# Patient Record
Sex: MALE | Race: WHITE | Employment: FULL TIME | URBAN - METROPOLITAN AREA
[De-identification: names, ages, dates, MRNs, and addresses within clinical notes are randomized per-mention and may not be internally consistent; named-entity substitution may affect disease eponyms.]

---

## 2018-04-26 ENCOUNTER — OFFICE VISIT (OUTPATIENT)
Dept: URGENT CARE | Facility: CLINIC | Age: 58
End: 2018-04-26
Payer: COMMERCIAL

## 2018-04-26 VITALS
SYSTOLIC BLOOD PRESSURE: 122 MMHG | HEIGHT: 71 IN | OXYGEN SATURATION: 97 % | BODY MASS INDEX: 36.4 KG/M2 | HEART RATE: 85 BPM | RESPIRATION RATE: 16 BRPM | TEMPERATURE: 98.2 F | DIASTOLIC BLOOD PRESSURE: 90 MMHG | WEIGHT: 260 LBS

## 2018-04-26 DIAGNOSIS — J01.00 ACUTE NON-RECURRENT MAXILLARY SINUSITIS: ICD-10-CM

## 2018-04-26 DIAGNOSIS — R05.9 COUGH: ICD-10-CM

## 2018-04-26 DIAGNOSIS — J98.8 RTI (RESPIRATORY TRACT INFECTION): ICD-10-CM

## 2018-04-26 PROCEDURE — 99214 OFFICE O/P EST MOD 30 MIN: CPT | Performed by: NURSE PRACTITIONER

## 2018-04-26 RX ORDER — BENZONATATE 100 MG/1
100 CAPSULE ORAL 3 TIMES DAILY PRN
Qty: 60 CAP | Refills: 0 | Status: SHIPPED | OUTPATIENT
Start: 2018-04-26 | End: 2018-05-11

## 2018-04-26 RX ORDER — AZITHROMYCIN 250 MG/1
TABLET, FILM COATED ORAL
Qty: 6 TAB | Refills: 0 | Status: SHIPPED | OUTPATIENT
Start: 2018-04-26 | End: 2018-05-11

## 2018-04-26 ASSESSMENT — ENCOUNTER SYMPTOMS
NAUSEA: 0
SINUS PRESSURE: 0
VOMITING: 0
SWEATS: 0
DIZZINESS: 0
COUGH: 1
EYE PAIN: 0
SORE THROAT: 0
MYALGIAS: 1
NECK PAIN: 0
SHORTNESS OF BREATH: 0
FEVER: 1
CHILLS: 0

## 2018-04-26 ASSESSMENT — PATIENT HEALTH QUESTIONNAIRE - PHQ9: CLINICAL INTERPRETATION OF PHQ2 SCORE: 0

## 2018-04-26 NOTE — PROGRESS NOTES
Subjective:     Arias Nath is a 57 y.o. male who presents for Sinus Problem (cough with phlegm green and clear, headache, x2wks)       Sinus Problem   This is a new problem. The current episode started 1 to 4 weeks ago. The problem is unchanged. There has been no fever. His pain is at a severity of 2/10. The pain is mild. Associated symptoms include congestion and coughing. Pertinent negatives include no chills, neck pain, shortness of breath, sinus pressure or sore throat. Past treatments include acetaminophen. The treatment provided no relief.   Cough   This is a new problem. The current episode started 1 to 4 weeks ago. The problem has been unchanged. The problem occurs constantly. The cough is productive of sputum. Associated symptoms include a fever, myalgias and nasal congestion. Pertinent negatives include no chest pain, chills, rash, sore throat, shortness of breath or sweats. Nothing aggravates the symptoms. He has tried OTC cough suppressant for the symptoms. The treatment provided no relief. His past medical history is significant for bronchitis.     Past Medical History:   Diagnosis Date   • Hypertension    No past surgical history on file.  Social History     Social History   • Marital status:      Spouse name: N/A   • Number of children: N/A   • Years of education: N/A     Occupational History   • Not on file.     Social History Main Topics   • Smoking status: Never Smoker   • Smokeless tobacco: Never Used   • Alcohol use Yes      Comment: social   • Drug use: No   • Sexual activity: Not on file     Other Topics Concern   • Not on file     Social History Narrative   • No narrative on file    No family history on file. Review of Systems   Constitutional: Positive for fever and malaise/fatigue. Negative for chills.   HENT: Positive for congestion. Negative for sinus pressure and sore throat.    Eyes: Negative for pain.   Respiratory: Positive for cough. Negative for shortness of breath.   "  Cardiovascular: Negative for chest pain.   Gastrointestinal: Negative for nausea and vomiting.   Genitourinary: Negative for hematuria.   Musculoskeletal: Positive for myalgias. Negative for neck pain.   Skin: Negative for rash.   Neurological: Negative for dizziness.   No Known Allergies   Objective:   /90   Pulse 85   Temp 36.8 °C (98.2 °F)   Resp 16   Ht 1.803 m (5' 11\")   Wt 117.9 kg (260 lb)   SpO2 97%   BMI 36.26 kg/m²   Physical Exam   Constitutional: He is oriented to person, place, and time. He appears well-developed and well-nourished. No distress.   HENT:   Head: Normocephalic and atraumatic.   Nose: Rhinorrhea present. Right sinus exhibits maxillary sinus tenderness and frontal sinus tenderness. Left sinus exhibits maxillary sinus tenderness and frontal sinus tenderness.   Eyes: Conjunctivae and EOM are normal. Pupils are equal, round, and reactive to light.   Cardiovascular: Normal rate and regular rhythm.    No murmur heard.  Pulmonary/Chest: Effort normal and breath sounds normal. No respiratory distress. He has no decreased breath sounds. He has no wheezes. He has no rhonchi. He has no rales.   Abdominal: Soft. He exhibits no distension. There is no tenderness.   Neurological: He is alert and oriented to person, place, and time. He has normal reflexes. No sensory deficit.   Skin: Skin is warm and dry.   Psychiatric: He has a normal mood and affect.   Vitals reviewed.        Assessment/Plan:   Assessment    1. Acute non-recurrent maxillary sinusitis  2. RTI (respiratory tract infection)  3. Cough  - benzonatate (TESSALON) 100 MG Cap; Take 1 Cap by mouth 3 times a day as needed for Cough.  Dispense: 60 Cap; Refill: 0  - azithromycin (ZITHROMAX) 250 MG Tab; Take 2 tablets by mouth on day one. Take one tablet by mouth the remaining days until gone  Dispense: 6 Tab; Refill: 0    Advised to continue supportive care with Tylenol and/or ibuprofen for fevers and discomfort. Increased fluids and " "electrolytes.  Patient requesting azithromycin \"stating it is the only antibiotic that works\".  Contingent antibiotic prescription given to patient to fill upon meeting criteria of guidelines discussed.     Patient given precautionary s/sx that mandate immediate follow up and evaluation in the ED. Advised of risks of not doing so.    DDX, Supportive care, and indications for immediate follow-up discussed with patient.    Instructed to return to clinic or nearest emergency department if we are not available for any change in condition, further concerns, or worsening of symptoms.    The patient demonstrated a good understanding and agreed with the treatment plan.      "

## 2018-05-11 ENCOUNTER — OFFICE VISIT (OUTPATIENT)
Dept: MEDICAL GROUP | Facility: MEDICAL CENTER | Age: 58
End: 2018-05-11
Payer: COMMERCIAL

## 2018-05-11 VITALS
DIASTOLIC BLOOD PRESSURE: 80 MMHG | BODY MASS INDEX: 36.36 KG/M2 | TEMPERATURE: 97.8 F | HEIGHT: 71 IN | HEART RATE: 78 BPM | SYSTOLIC BLOOD PRESSURE: 118 MMHG | WEIGHT: 259.7 LBS | OXYGEN SATURATION: 95 %

## 2018-05-11 DIAGNOSIS — I10 ESSENTIAL HYPERTENSION: ICD-10-CM

## 2018-05-11 DIAGNOSIS — E66.9 OBESITY (BMI 35.0-39.9 WITHOUT COMORBIDITY): ICD-10-CM

## 2018-05-11 DIAGNOSIS — R05.9 COUGH: ICD-10-CM

## 2018-05-11 PROCEDURE — 99214 OFFICE O/P EST MOD 30 MIN: CPT | Performed by: FAMILY MEDICINE

## 2018-05-11 RX ORDER — LOSARTAN POTASSIUM 100 MG/1
TABLET ORAL
COMMUNITY
Start: 2018-04-26 | End: 2018-07-27 | Stop reason: SDUPTHER

## 2018-05-11 RX ORDER — METHYLPREDNISOLONE 4 MG/1
TABLET ORAL
Qty: 21 TAB | Refills: 0 | Status: SHIPPED | OUTPATIENT
Start: 2018-05-11 | End: 2019-08-02

## 2018-05-11 RX ORDER — AMLODIPINE BESYLATE 5 MG/1
TABLET ORAL
COMMUNITY
Start: 2018-04-26 | End: 2018-07-27 | Stop reason: SDUPTHER

## 2018-05-11 NOTE — PROGRESS NOTES
CC:  Diagnoses of Cough, Essential hypertension, and Obesity (BMI 35.0-39.9 without comorbidity) were pertinent to this visit.    HISTORY OF THE PRESENT ILLNESS: Patient is a 57 y.o. male. This pleasant patient is here today to establish care after being seen by Dr. Kim, internal medicine at [facility] and then Dr. Zack mckeon, family practitioner at [facility] family medicine. His insurance is no longer accepted at those practices and he is here today to establish care. Patient has the complaint of a cough that is been problematic. Details below. His only chronic health problem is hypertension.    Health Maintenance: Completed      Cough  This is a chronic problem that is bothering this patient for 1-2 months. He had an illness in January seem to get better then he was seen in the urgent care with another illness thought to be a sinusitis and a cough. He was placed on Zithromax and given Tessalon Perles that really have not helped with his cough. He tells me that he travels for his job and when he was down in [place] where there were many plans symbolism he noticed that his drainage became excessive with clear rhinorrhea and drainage in the back of his throat according to his cough was worse. Then when he went to [place], he cough lasts any noted on the way back home that he had been coughing much less. Now back in [place] his home station he is back having a cough that is sometimes productive of a more liquid sputum not a thick green sputum such as she would see with infection. I suspect this patient either has residual viral cough or is starting to experience some allergies. We will treat and try to get him feeling better.    Essential hypertension  This is a chronic health problem that this patient has had for proximally 20 years. They've worked through multiple combinations of medications to finally settle on amlodipine 5 mg daily and losartan 100 mg daily. His blood pressure is excellent today at 118/80. We will get  some baseline blood work and check his lipids and his electrolytes. Will follow up with those as is appropriate.    Obesity (BMI 35.0-39.9 without comorbidity) (HCC)  This is a chronic health problem that is uncontrolled with current medications and lifestyle measures.    Allergies: Patient has no known allergies.    Current Outpatient Prescriptions Ordered in [facility]   Medication Sig Dispense Refill   • amLODIPine (NORVASC) 5 MG Tab      • losartan (COZAAR) 100 MG Tab      • MethylPREDNISolone (MEDROL DOSEPAK) 4 MG Tablet Therapy Pack As directed on the packaging label. 21 Tab 0     No current Epic-ordered facility-administered medications on file.        Past Medical History:   Diagnosis Date   • Cough 5/11/2018   • Essential hypertension 5/11/2018   • Hypertension        Past Surgical History:   Procedure Laterality Date   • KNEE ARTHROSCOPY Right 2005       Social History   Substance Use Topics   • Smoking status: Never Smoker   • Smokeless tobacco: Never Used   • Alcohol use 0.6 oz/week     1 Cans of beer per week      Comment: social       Social History     Social History Narrative   • No narrative on file       Family History   Problem Relation Age of Onset   • Colon Cancer Mother    • Hypertension Mother    • Hyperlipidemia Mother    • No Known Problems Father        ROS:     - Constitutional: Negative for fever, chills, unexpected weight change, and fatigue/generalized weakness.     - HEENT: Negative for headaches, vision changes, hearing changes, ear pain, ear discharge, rhinorrhea, sinus congestion, sore throat, and neck pain.      - Respiratory: Positive for nonproductive cough persistent throughout the day otherwise denies  chest congestion, dyspnea, wheezing, and crackles.      - Cardiovascular: Negative for chest pain, palpitations, orthopnea, and bilateral lower extremity edema.     - Gastrointestinal: Negative for heartburn, nausea, vomiting, abdominal pain, hematochezia, melena, diarrhea,  "constipation, and greasy/foul-smelling stools.     - Genitourinary: Negative for dysuria, polyuria, hematuria, pyuria, urinary urgency, and urinary incontinence.     - Musculoskeletal: Negative for myalgias, back pain, and joint pain.     - Skin: Negative for rash, itching, cyanotic skin color change.     - Neurological: Negative for dizziness, tingling, tremors, focal sensory deficit, focal weakness and headaches.     - Endo/Heme/Allergies: Does not bruise/bleed easily.     - Psychiatric/Behavioral: Negative for depression, suicidal/homicidal ideation and memory loss.        - NOTE: All other systems reviewed and are negative, except as in HPI.      .      Exam: Blood pressure 118/80, pulse 78, temperature 36.6 °C (97.8 °F), height 1.803 m (5' 11\"), weight 117.8 kg (259 lb 11.2 oz), SpO2 95 %. Body mass index is 36.22 kg/m².    General: Normal appearing. No distress.  HEENT: Normocephalic. Eyes conjunctiva clear lids without ptosis, pupils equal and reactive to light accommodation, ears normal shape and contour, canals are clear bilaterally, tympanic membranes are benign, nasal mucosa benign, oropharynx is without erythema, edema or exudates.   Neck: Supple without JVD or bruit. Thyroid is not enlarged.  Pulmonary: Clear to ausculation.  Normal effort. No rales, ronchi, or wheezing.  Cardiovascular: Regular rate and rhythm without murmur. Carotid and radial pulses are intact and equal bilaterally.  Abdomen: Soft, nontender, nondistended. Normal bowel sounds. Liver and spleen are not palpable  Neurologic: Grossly nonfocal  Lymph: No cervical, supraclavicular or axillary lymph nodes are palpable  Skin: Warm and dry.  No obvious lesions.  Musculoskeletal: Normal gait. No extremity cyanosis, clubbing, or edema.  Psych: Normal mood and affect. Alert and oriented x3. Judgment and insight is normal.    Please note that this dictation was created using voice recognition software. I have made every reasonable attempt to " correct obvious errors, but I expect that there are errors of grammar and possibly content that I did not discover before finalizing the note.      Assessment/Plan  1. Cough  Uncontrolled, I suspect this is directly related to allergies. We will do a Medrol Dosepak which she will start immediately. If his cough is persisting after this we need to proceed with CT of the chest    2. Essential hypertension  Controlled, patient is going to get blood work and we will see him back for results. He is to continue with current medications which has his lipid pressure well controlled  - COMP METABOLIC PANEL; Future  - CBC WITH DIFFERENTIAL; Future  - LIPID PROFILE; Future    3. Obesity (BMI 35.0-39.9 without comorbidity)  Uncontrolled, patient I spent a great deal of time talking about his weight and ways in the past he has lost weight. He needs to get busy working on weight loss once again. Previously he was able to do it through the [employer], if that works for him I recommended he get back to doing that otherwise we can refer him to the program here at [facility].    Patient identified as having weight management issue.  Appropriate orders and counseling given    - Patient identified as having weight management issue.  Appropriate orders and counseling given.

## 2018-06-19 ENCOUNTER — HOSPITAL ENCOUNTER (OUTPATIENT)
Dept: LAB | Facility: MEDICAL CENTER | Age: 58
End: 2018-06-19
Attending: FAMILY MEDICINE
Payer: COMMERCIAL

## 2018-06-19 DIAGNOSIS — I10 ESSENTIAL HYPERTENSION: ICD-10-CM

## 2018-06-19 LAB
ALBUMIN SERPL BCP-MCNC: 4.1 G/DL (ref 3.2–4.9)
ALBUMIN/GLOB SERPL: 1.4 G/DL
ALP SERPL-CCNC: 70 U/L (ref 30–99)
ALT SERPL-CCNC: 27 U/L (ref 2–50)
ANION GAP SERPL CALC-SCNC: 7 MMOL/L (ref 0–11.9)
AST SERPL-CCNC: 16 U/L (ref 12–45)
BASOPHILS # BLD AUTO: 0.4 % (ref 0–1.8)
BASOPHILS # BLD: 0.02 K/UL (ref 0–0.12)
BILIRUB SERPL-MCNC: 0.7 MG/DL (ref 0.1–1.5)
BUN SERPL-MCNC: 17 MG/DL (ref 8–22)
CALCIUM SERPL-MCNC: 9 MG/DL (ref 8.5–10.5)
CHLORIDE SERPL-SCNC: 106 MMOL/L (ref 96–112)
CHOLEST SERPL-MCNC: 161 MG/DL (ref 100–199)
CO2 SERPL-SCNC: 26 MMOL/L (ref 20–33)
CREAT SERPL-MCNC: 0.83 MG/DL (ref 0.5–1.4)
EOSINOPHIL # BLD AUTO: 0.07 K/UL (ref 0–0.51)
EOSINOPHIL NFR BLD: 1.3 % (ref 0–6.9)
ERYTHROCYTE [DISTWIDTH] IN BLOOD BY AUTOMATED COUNT: 42.4 FL (ref 35.9–50)
GLOBULIN SER CALC-MCNC: 3 G/DL (ref 1.9–3.5)
GLUCOSE SERPL-MCNC: 95 MG/DL (ref 65–99)
HCT VFR BLD AUTO: 43.8 % (ref 42–52)
HDLC SERPL-MCNC: 42 MG/DL
HGB BLD-MCNC: 14.7 G/DL (ref 14–18)
IMM GRANULOCYTES # BLD AUTO: 0.02 K/UL (ref 0–0.11)
IMM GRANULOCYTES NFR BLD AUTO: 0.4 % (ref 0–0.9)
LDLC SERPL CALC-MCNC: 101 MG/DL
LYMPHOCYTES # BLD AUTO: 1.58 K/UL (ref 1–4.8)
LYMPHOCYTES NFR BLD: 29.2 % (ref 22–41)
MCH RBC QN AUTO: 29.4 PG (ref 27–33)
MCHC RBC AUTO-ENTMCNC: 33.6 G/DL (ref 33.7–35.3)
MCV RBC AUTO: 87.6 FL (ref 81.4–97.8)
MONOCYTES # BLD AUTO: 0.7 K/UL (ref 0–0.85)
MONOCYTES NFR BLD AUTO: 12.9 % (ref 0–13.4)
NEUTROPHILS # BLD AUTO: 3.03 K/UL (ref 1.82–7.42)
NEUTROPHILS NFR BLD: 55.8 % (ref 44–72)
NRBC # BLD AUTO: 0 K/UL
NRBC BLD-RTO: 0 /100 WBC
PLATELET # BLD AUTO: 223 K/UL (ref 164–446)
PMV BLD AUTO: 8.9 FL (ref 9–12.9)
POTASSIUM SERPL-SCNC: 4.3 MMOL/L (ref 3.6–5.5)
PROT SERPL-MCNC: 7.1 G/DL (ref 6–8.2)
RBC # BLD AUTO: 5 M/UL (ref 4.7–6.1)
SODIUM SERPL-SCNC: 139 MMOL/L (ref 135–145)
TRIGL SERPL-MCNC: 92 MG/DL (ref 0–149)
WBC # BLD AUTO: 5.4 K/UL (ref 4.8–10.8)

## 2018-06-19 PROCEDURE — 85025 COMPLETE CBC W/AUTO DIFF WBC: CPT

## 2018-06-19 PROCEDURE — 80053 COMPREHEN METABOLIC PANEL: CPT

## 2018-06-19 PROCEDURE — 36415 COLL VENOUS BLD VENIPUNCTURE: CPT

## 2018-06-19 PROCEDURE — 80061 LIPID PANEL: CPT

## 2018-06-20 ENCOUNTER — TELEPHONE (OUTPATIENT)
Dept: MEDICAL GROUP | Facility: MEDICAL CENTER | Age: 58
End: 2018-06-20

## 2018-06-20 NOTE — TELEPHONE ENCOUNTER
ESTABLISHED PATIENT PRE-VISIT PLANNING     Note: Patient will not be contacted if there is no indication to call.     1.  Reviewed notes from the last few office visits within the medical group: Yes 05/11/2018    2.  If any orders were placed at last visit or intended to be done for this visit (i.e. 6 mos follow-up), do we have Results/Consult Notes?        •  Labs - Labs ordered, completed on 06/19/2018 and results are in chart.   Note: If patient appointment is for lab review and patient did not complete labs, check with provider if OK to reschedule patient until labs completed.       •  Imaging - Imaging was not ordered at last office visit.       •  Referrals - No referrals were ordered at last office visit.    3. Is this appointment scheduled as a Hospital Follow-Up? No    4.  Immunizations were updated in Epic using WebIZ?: No WebIZ record      5.  Patient is due for the following Health Maintenance Topics:   Health Maintenance Due   Topic Date Due   • IMM DTaP/Tdap/Td Vaccine (1 - Tdap) [birth year]1981       6.  MDX printed for Provider? NO    7.  Patient was NOT informed to arrive 15 min prior to their scheduled appointment and bring in their medication bottles.

## 2018-06-22 ENCOUNTER — OFFICE VISIT (OUTPATIENT)
Dept: INTERNAL MEDICINE | Facility: MEDICAL CENTER | Age: 58
End: 2018-06-22
Payer: COMMERCIAL

## 2018-06-22 VITALS
WEIGHT: 261.4 LBS | TEMPERATURE: 98 F | SYSTOLIC BLOOD PRESSURE: 122 MMHG | HEART RATE: 80 BPM | DIASTOLIC BLOOD PRESSURE: 84 MMHG | HEIGHT: 71 IN | OXYGEN SATURATION: 94 % | BODY MASS INDEX: 36.6 KG/M2

## 2018-06-22 DIAGNOSIS — M70.22 OLECRANON BURSITIS, LEFT ELBOW: ICD-10-CM

## 2018-06-22 PROCEDURE — 99213 OFFICE O/P EST LOW 20 MIN: CPT | Performed by: INTERNAL MEDICINE

## 2018-06-22 NOTE — PROGRESS NOTES
Established Patient    Mr. Allen a 57 y.o. male who presents today with:  CC: Bug Bite (left elbow. x1 week)        Assessment and Plan    1. Olecranon bursitis -patient with difficulty and pain with full extension at the elbow generalized redness and increased temperature of the elbow and forearm. Question olecranon bursitis, septic versus early septic arthritis. Would not expect patient to have pain in elbow with passive movement. If just bursitis. I am referring the patient to the [facility] express for evaluation immediately. He will leave this office and drive there. Explained to patient that is very important to have this evaluated today. He understands.      Current Outpatient Prescriptions   Medication Sig Dispense Refill   • amLODIPine (NORVASC) 5 MG Tab      • losartan (COZAAR) 100 MG Tab      • MethylPREDNISolone (MEDROL DOSEPAK) 4 MG Tablet Therapy Pack As directed on the packaging label. 21 Tab 0     No current facility-administered medications for this visit.          followup No Follow-up on file.    This note was created using voice recognition software (Dragon). The accuracy of the dictation is limited by the abilities of the software. I have reviewed the note prior to signing, however some errors in grammar and context are still possible. If you have any questions related to this note please do not hesitate to contact our office.   _______________________________________________________    HPI:   1. Bug bite, initial encounter  One week ago he felt a sharp pinch left olecranon area. while he was sitting in the backyard at night. Felt a sting. Very short and he was bitten by a bug. Developed a small bump on the olecranon.. Solid in nature. Gradually increased over time. One week later it ruptured and fluid that resemble pus was expressed. Currently painful. No fevers or chills. Pain in the elbow joint on the left with full extension and full flexion. Generalized edema from the  "olecranon throughout the elbow.       has a past medical history of Cough (5/11/2018); Essential hypertension (5/11/2018); and Hypertension.     reports that he has never smoked. He has never used smokeless tobacco. He reports that he drinks about 0.6 oz of alcohol per week . He reports that he does not use drugs.      ROS: Pertinent positives as stated in HPI, all others reviewed as negative:        Physical Exam  /84   Pulse 80   Temp 36.7 °C (98 °F)   Ht 1.803 m (5' 11\")   Wt 118.6 kg (261 lb 6.4 oz)   SpO2 94%   BMI 36.46 kg/m²     Other: Left elbow: Generalized edema of the left elbow. Olecranon fluctuant and painful to palpation. Healing blister lesion tip of the elbow. Elbow is very warm to the touch from mid forearm to mid biceps. Full range of motion but discomfort with full extension of the elbow and full flexion. Similar discomfort with passive flexion and extension. Redness of the olecranon area.        Current Outpatient Prescriptions on File Prior to Visit   Medication Sig Dispense Refill   • amLODIPine (NORVASC) 5 MG Tab      • losartan (COZAAR) 100 MG Tab      • MethylPREDNISolone (MEDROL DOSEPAK) 4 MG Tablet Therapy Pack As directed on the packaging label. 21 Tab 0     No current facility-administered medications on file prior to visit.            Signed by: Alvin Schwartz M.D.  "

## 2018-06-27 ENCOUNTER — OFFICE VISIT (OUTPATIENT)
Dept: MEDICAL GROUP | Facility: MEDICAL CENTER | Age: 58
End: 2018-06-27
Payer: COMMERCIAL

## 2018-06-27 VITALS
TEMPERATURE: 98.2 F | HEIGHT: 71 IN | SYSTOLIC BLOOD PRESSURE: 120 MMHG | OXYGEN SATURATION: 95 % | BODY MASS INDEX: 36.94 KG/M2 | DIASTOLIC BLOOD PRESSURE: 76 MMHG | WEIGHT: 263.89 LBS | HEART RATE: 75 BPM | RESPIRATION RATE: 16 BRPM

## 2018-06-27 DIAGNOSIS — I10 ESSENTIAL HYPERTENSION: ICD-10-CM

## 2018-06-27 DIAGNOSIS — M70.22 OLECRANON BURSITIS OF LEFT ELBOW: ICD-10-CM

## 2018-06-27 DIAGNOSIS — E66.9 OBESITY (BMI 35.0-39.9 WITHOUT COMORBIDITY): ICD-10-CM

## 2018-06-27 PROBLEM — R05.9 COUGH: Status: RESOLVED | Noted: 2018-05-11 | Resolved: 2018-06-27

## 2018-06-27 PROCEDURE — 99214 OFFICE O/P EST MOD 30 MIN: CPT | Performed by: FAMILY MEDICINE

## 2018-06-27 RX ORDER — AMOXICILLIN 500 MG/1
CAPSULE ORAL
COMMUNITY
Start: 2018-06-22 | End: 2019-08-02

## 2018-06-27 RX ORDER — DOXYCYCLINE HYCLATE 100 MG
TABLET ORAL
COMMUNITY
Start: 2018-06-22 | End: 2019-08-19

## 2018-06-27 RX ORDER — MELOXICAM 15 MG/1
TABLET ORAL
COMMUNITY
Start: 2018-06-22 | End: 2020-02-12

## 2018-06-27 NOTE — ASSESSMENT & PLAN NOTE
This is a chronic health problem for this patient that is really giving him a difficult time.  He has been watching his diet and been unable to lose weight.  I did advocate the metabolism revolution book for him to consider.  Is going to read up on that and then send me an email on 8/1/18 with his current weight.

## 2018-06-27 NOTE — ASSESSMENT & PLAN NOTE
This was something that started for this patient about June 20 or 21 he was seen on June 22 at the [facility].  Started on antibiotics and told that he had to complete all of his antibiotics and be very careful about the bursitis.  It already looks greatly improved.  He still has some swelling to the area.  States that the pain has completely resolved.  There is no erythema there is no drainage.  He is getting complete his antibiotics and I am going to have him use warm compresses to try and pull in the antibiotics to the bursa completely treated.  He has an appointment to see 1 of the sports medicine specialist at the [facility] and I have asked him to keep that appointment.  He is going to play golf this weekend he is to ice after the golf.

## 2018-06-27 NOTE — PROGRESS NOTES
CC:Diagnoses of Essential hypertension, Olecranon bursitis of left elbow, and Obesity (BMI 35.0-39.9 without comorbidity) ([facility]) were pertinent to this visit.      HISTORY OF PRESENT ILLNESS: Patient is a 57 y.o. male established patient who presents today to to follow-up on chronic health problems as outlined below.  Patient is blood work would like to review those results.    Health Maintenance: Completed    Essential hypertension  This is a chronic health problem that is well controlled with current medications and lifestyle measures. The patient denies chest pain, shortness of breath or dyspnea on exertion.  Blood pressure is excellent today at 120/76.  He will continue with current meds.  Patient's lipid panel comes back showing his total cholesterol is 161, triglycerides 92, HDL 42 and his LDL is 101.  We will continue to monitor    Olecranon bursitis of left elbow  This was something that started for this patient about June 20 or 21 he was seen on June 22 at the [facility].  Started on antibiotics and told that he had to complete all of his antibiotics and be very careful about the bursitis.  It already looks greatly improved.  He still has some swelling to the area.  States that the pain has completely resolved.  There is no erythema there is no drainage.  He is getting complete his antibiotics and I am going to have him use warm compresses to try and pull in the antibiotics to the bursa completely treated.  He has an appointment to see 1 of the sports medicine specialist at the [facility] and I have asked him to keep that appointment.  He is going to play golf this weekend he is to ice after the golf.    Obesity (BMI 35.0-39.9 without comorbidity) ([facility])  This is a chronic health problem for this patient that is really giving him a difficult time.  He has been watching his diet and been unable to lose weight.  I did advocate the metabolism revolution book for him to consider.  Is  going to read up on that and then send me an email on 8/1/18 with his current weight.      Patient Active Problem List    Diagnosis Date Noted   • Olecranon bursitis of left elbow 06/27/2018   • Essential hypertension 05/11/2018   • Obesity (BMI 35.0-39.9 without comorbidity) ([facility]) 05/11/2018      Allergies:Patient has no known allergies.    Current Outpatient Prescriptions   Medication Sig Dispense Refill   • amoxicillin (AMOXIL) 500 MG Cap      • doxycycline (VIBRAMYCIN) 100 MG Tab      • meloxicam (MOBIC) 15 MG tablet      • amLODIPine (NORVASC) 5 MG Tab      • losartan (COZAAR) 100 MG Tab      • MethylPREDNISolone (MEDROL DOSEPAK) 4 MG Tablet Therapy Pack As directed on the packaging label. 21 Tab 0     No current facility-administered medications for this visit.        Social History   Substance Use Topics   • Smoking status: Never Smoker   • Smokeless tobacco: Never Used   • Alcohol use 0.6 oz/week     1 Cans of beer per week      Comment: social     Social History     Social History Narrative   • No narrative on file       Family History   Problem Relation Age of Onset   • Colon Cancer Mother    • Hypertension Mother    • Hyperlipidemia Mother    • No Known Problems Father        Review of Systems:      - Constitutional: Negative for fever, chills, unexpected weight change, and fatigue/generalized weakness.     - HEENT: Negative for headaches, vision changes, hearing changes, ear pain, ear discharge, rhinorrhea, sinus congestion, sore throat, and neck pain.      - Respiratory: Negative for cough, sputum production, chest congestion, dyspnea, wheezing, and crackles.      - Cardiovascular: Negative for chest pain, palpitations, orthopnea, and bilateral lower extremity edema.     - Gastrointestinal: Negative for heartburn, nausea, vomiting, abdominal pain, hematochezia, melena, diarrhea, constipation, and greasy/foul-smelling stools.     - Genitourinary: Negative for dysuria, polyuria, hematuria, pyuria, urinary  "urgency, and urinary incontinence.    - Musculoskeletal: Negative for myalgias, back pain, and joint pain.     - Skin: Negative for rash, itching, cyanotic skin color change.     - Neurological: Negative for dizziness, tingling, tremors, focal sensory deficit, focal weakness and headaches.     - Endo/Heme/Allergies: Does not bruise/bleed easily.     - Psychiatric/Behavioral: Negative for depression, suicidal/homicidal ideation and memory loss.          - NOTE: All other systems reviewed and are negative, except as in HPI.    Exam:    Blood pressure 120/76, pulse 75, temperature 36.8 °C (98.2 °F), resp. rate 16, height 1.803 m (5' 11\"), weight 119.7 kg (263 lb 14.3 oz), SpO2 95 %. Body mass index is 36.81 kg/m².    General:  Well nourished, well developed male in NAD  LABS: 6/19/18: Results reviewed and discussed with the patient, questions answered.    Patient was seen for 25 minutes face to face of which, 20 minutes was spent counseling regarding the above mentioned problems.  Assessment/Plan:  1. Essential hypertension  Well-controlled, patient needs to get serious about weight loss because he is currently trading on excellent genetics but this will catch up with him and he will develop heart disease like most Americans.  He is physically active golfing on a regular basis but would like to see him add something more strenuous to his day.    2. Olecranon bursitis of left elbow  Stable, patient will keep the follow-up appointment with Greer Turner MD at [facility].    3. Obesity (BMI 35.0-39.9 without comorbidity) (HCC)  Uncontrolled, patient going to get serious about weight loss and will send me a my chart message on 8/1/18 with his current weight.  Today he was 263 here in the office        "

## 2018-07-27 RX ORDER — AMLODIPINE BESYLATE 5 MG/1
5 TABLET ORAL DAILY
Qty: 90 TAB | Refills: 3 | Status: SHIPPED | OUTPATIENT
Start: 2018-07-27 | End: 2019-07-16 | Stop reason: SDUPTHER

## 2018-07-27 RX ORDER — LOSARTAN POTASSIUM 100 MG/1
100 TABLET ORAL DAILY
Qty: 90 TAB | Refills: 3 | Status: SHIPPED | OUTPATIENT
Start: 2018-07-27 | End: 2019-07-17 | Stop reason: SDUPTHER

## 2018-07-30 ENCOUNTER — TELEPHONE (OUTPATIENT)
Dept: MEDICAL GROUP | Facility: MEDICAL CENTER | Age: 58
End: 2018-07-30

## 2018-07-30 NOTE — TELEPHONE ENCOUNTER
"· Authorization Request paperwork received from Lucias requiring provider signature.     · All appropriate fields completed by Medical Assistant: No    · Paperwork placed in \"MA to Provider\" folder/basket. Awaiting provider completion/signature.    "

## 2018-08-01 ENCOUNTER — PATIENT MESSAGE (OUTPATIENT)
Dept: MEDICAL GROUP | Facility: MEDICAL CENTER | Age: 58
End: 2018-08-01

## 2018-08-02 NOTE — TELEPHONE ENCOUNTER
From: Arias Nath  To: Leesa Brewer M.D.  Sent: 8/1/2018 9:55 PM PDT  Subject: RE: Non-Urgent Medical Question    Thanks. Trying to work out more as well. Will shoot you my weight on September 1. Keeps me motivated!!!    Thierry. Linda    ----- Message -----  From: Leesa Breewr M.D.  Sent: 8/1/18, 4:45 PM  To: Arias Nath  Subject: RE: Non-Urgent Medical Question    Lloyd Bianchi  I am showing that you weight 263 when you were in the office 6/27/18. You have lost a pound a week! That is FABULOUS!! If you did that every week for a year you would be down 52 pounds. Keep trying to eat better and better, I know you can do this!  Take care, Dr. Landers      ----- Message -----   From: Arias Nath   Sent: 8/1/2018 4:35 PM PDT   To: Leesa Brewer M.D.  Subject: Non-Urgent Medical Question    Hi doc.     Weight August 1 is 257.     Little better. Trying to eat better.     Thanks. Linda

## 2018-10-28 ENCOUNTER — PATIENT MESSAGE (OUTPATIENT)
Dept: MEDICAL GROUP | Facility: MEDICAL CENTER | Age: 58
End: 2018-10-28

## 2018-10-28 DIAGNOSIS — L81.9 CHANGING PIGMENTED SKIN LESION: ICD-10-CM

## 2018-10-29 ENCOUNTER — PATIENT MESSAGE (OUTPATIENT)
Dept: MEDICAL GROUP | Facility: MEDICAL CENTER | Age: 58
End: 2018-10-29

## 2018-10-30 NOTE — TELEPHONE ENCOUNTER
From: Arias Nath  To: Leesa Brewer M.D.  Sent: 10/29/2018 9:11 AM PDT  Subject: RE: Non-Urgent Medical Question    Ok thanks. I will call over there tomorrow after you guys do the referral to get an appointment. I think you and I are scheduled for December. If not I will call the office and get an appt set up.     Thanks.     ----- Message -----  From: Leesa Brewer M.D.  Sent: 10/29/18, 8:22 AM  To: Arias Nath  Subject: RE: Non-Urgent Medical Question    Lloyd Bianchi  Good job on the weight loss! We will put in that referral for you today. My suggestion will be the skin cancer [place] on [facility] or over in [place] because they have the earliest appointments.  Take care, Dr. Landers      ----- Message -----   From: Arias Nath   Sent: 10/28/2018 12:27 PM PDT   To: Leesa Brewer M.D.  Subject: Non-Urgent Medical Question    Hi Doc,    I have a couple of moles and skin tags that I would like to have a dermatologist take a look at. Can you give me a referral. I have heard it takes a while to get an appointment. My wife's dad  of melanoma so my wife has been on me to get them checked out.     Let me know. Weight update. 254.     Thanks. Linda

## 2018-11-29 ENCOUNTER — APPOINTMENT (RX ONLY)
Dept: URBAN - METROPOLITAN AREA CLINIC 4 | Facility: CLINIC | Age: 58
Setting detail: DERMATOLOGY
End: 2018-11-29

## 2018-11-29 DIAGNOSIS — L81.4 OTHER MELANIN HYPERPIGMENTATION: ICD-10-CM

## 2018-11-29 DIAGNOSIS — L82.1 OTHER SEBORRHEIC KERATOSIS: ICD-10-CM

## 2018-11-29 DIAGNOSIS — L91.8 OTHER HYPERTROPHIC DISORDERS OF THE SKIN: ICD-10-CM

## 2018-11-29 DIAGNOSIS — D22 MELANOCYTIC NEVI: ICD-10-CM

## 2018-11-29 PROBLEM — I10 ESSENTIAL (PRIMARY) HYPERTENSION: Status: ACTIVE | Noted: 2018-11-29

## 2018-11-29 PROBLEM — D48.5 NEOPLASM OF UNCERTAIN BEHAVIOR OF SKIN: Status: ACTIVE | Noted: 2018-11-29

## 2018-11-29 PROCEDURE — ? COUNSELING

## 2018-11-29 PROCEDURE — 11101: CPT

## 2018-11-29 PROCEDURE — 99203 OFFICE O/P NEW LOW 30 MIN: CPT | Mod: 25

## 2018-11-29 PROCEDURE — ? BIOPSY BY SHAVE METHOD

## 2018-11-29 PROCEDURE — 11100: CPT

## 2018-11-29 PROCEDURE — ? OBSERVATION AND MEASURE

## 2018-11-29 ASSESSMENT — LOCATION SIMPLE DESCRIPTION DERM
LOCATION SIMPLE: RIGHT ANTERIOR NECK
LOCATION SIMPLE: RIGHT SCALP
LOCATION SIMPLE: LEFT UPPER BACK
LOCATION SIMPLE: LEFT TEMPLE
LOCATION SIMPLE: LEFT ZYGOMA
LOCATION SIMPLE: POSTERIOR NECK

## 2018-11-29 ASSESSMENT — LOCATION DETAILED DESCRIPTION DERM
LOCATION DETAILED: RIGHT MEDIAL FRONTAL SCALP
LOCATION DETAILED: RIGHT LATERAL TRAPEZIAL NECK
LOCATION DETAILED: RIGHT CLAVICULAR NECK
LOCATION DETAILED: LEFT CENTRAL ZYGOMA
LOCATION DETAILED: LEFT SUPERIOR LATERAL UPPER BACK
LOCATION DETAILED: LEFT MEDIAL TRAPEZIAL NECK
LOCATION DETAILED: LEFT CENTRAL TEMPLE

## 2018-11-29 ASSESSMENT — LOCATION ZONE DERM
LOCATION ZONE: TRUNK
LOCATION ZONE: SCALP
LOCATION ZONE: FACE
LOCATION ZONE: NECK

## 2018-11-29 NOTE — PROCEDURE: BIOPSY BY SHAVE METHOD
Detail Level: Detailed
Biopsy Method: Dermablade
Hemostasis: Drysol
Bill For Surgical Tray: no
Notification Instructions: Patient will be notified of biopsy results. However, patient instructed to call the office if not contacted within 2 weeks.
Curettage Text: The wound bed was treated with curettage after the biopsy was performed.
Lab: [id number]
Cryotherapy Text: The wound bed was treated with cryotherapy after the biopsy was performed.
Consent: Written consent was obtained and risks were reviewed including but not limited to scarring, infection, bleeding, scabbing, incomplete removal, nerve damage and allergy to anesthesia.
Anesthesia Type: 1% lidocaine with epinephrine
Size Of Lesion In Cm: 0
Lab Facility: 
Wound Care: Petrolatum
Depth Of Biopsy: dermis
Electrodesiccation Text: The wound bed was treated with electrodesiccation after the biopsy was performed.
Biopsy Type: H and E
Type Of Destruction Used: Curettage
Billing Type: Third-Party Bill
Electrodesiccation And Curettage Text: The wound bed was treated with electrodesiccation and curettage after the biopsy was performed.
Anesthesia Volume In Cc: 0.5
Dressing: bandage
Was A Bandage Applied: Yes
Silver Nitrate Text: The wound bed was treated with silver nitrate after the biopsy was performed.
Post-Care Instructions: I reviewed with the patient in detail post-care instructions. Patient is to keep the biopsy site dry overnight, and then apply bacitracin twice daily until healed. Patient may apply hydrogen peroxide soaks to remove any crusting.

## 2019-07-16 ENCOUNTER — APPOINTMENT (OUTPATIENT)
Dept: MEDICAL GROUP | Facility: MEDICAL CENTER | Age: 59
End: 2019-07-16
Payer: COMMERCIAL

## 2019-07-16 RX ORDER — AMLODIPINE BESYLATE 5 MG/1
5 TABLET ORAL DAILY
Qty: 90 TAB | Refills: 0 | Status: SHIPPED | OUTPATIENT
Start: 2019-07-16 | End: 2019-08-19 | Stop reason: SDUPTHER

## 2019-07-16 NOTE — TELEPHONE ENCOUNTER
Was the patient seen in the last year in this department? No    Does patient have an active prescription for medications requested? No     Received Request Via: Patient

## 2019-07-18 RX ORDER — LOSARTAN POTASSIUM 100 MG/1
TABLET ORAL
Qty: 90 TAB | Refills: 3 | Status: SHIPPED | OUTPATIENT
Start: 2019-07-18 | End: 2019-08-19 | Stop reason: SDUPTHER

## 2019-08-02 ENCOUNTER — OFFICE VISIT (OUTPATIENT)
Dept: MEDICAL GROUP | Facility: MEDICAL CENTER | Age: 59
End: 2019-08-02
Payer: COMMERCIAL

## 2019-08-02 VITALS
OXYGEN SATURATION: 96 % | RESPIRATION RATE: 14 BRPM | SYSTOLIC BLOOD PRESSURE: 124 MMHG | HEIGHT: 70 IN | HEART RATE: 71 BPM | DIASTOLIC BLOOD PRESSURE: 76 MMHG | WEIGHT: 257 LBS | TEMPERATURE: 98.2 F | BODY MASS INDEX: 36.79 KG/M2

## 2019-08-02 DIAGNOSIS — E66.9 OBESITY (BMI 35.0-39.9 WITHOUT COMORBIDITY): ICD-10-CM

## 2019-08-02 DIAGNOSIS — Z11.59 ENCOUNTER FOR HEPATITIS C SCREENING TEST FOR LOW RISK PATIENT: ICD-10-CM

## 2019-08-02 DIAGNOSIS — Z00.00 WELLNESS EXAMINATION: ICD-10-CM

## 2019-08-02 DIAGNOSIS — I10 ESSENTIAL HYPERTENSION: ICD-10-CM

## 2019-08-02 PROCEDURE — 99396 PREV VISIT EST AGE 40-64: CPT | Performed by: FAMILY MEDICINE

## 2019-08-02 ASSESSMENT — PATIENT HEALTH QUESTIONNAIRE - PHQ9: CLINICAL INTERPRETATION OF PHQ2 SCORE: 0

## 2019-08-02 NOTE — ASSESSMENT & PLAN NOTE
Patient here today to do a wellness exam and follow-up on his hypertension that has been borderline the last few times he had his blood pressure checked.

## 2019-08-02 NOTE — ASSESSMENT & PLAN NOTE
This is a chronic health problem that is well controlled with current medications and lifestyle measures. Bp is good at 124/76.  The patient denies chest pain, shortness of breath or dyspnea on exertion.

## 2019-08-02 NOTE — PROGRESS NOTES
CC:Diagnoses of Wellness examination, Essential hypertension, Obesity (BMI 35.0-39.9 without comorbidity) ([facility]), and Encounter for hepatitis C screening test for low risk patient were pertinent to this visit.    HISTORY OF PRESENT ILLNESS: Patient is a 58 y.o. male established patient who presents today to talk about his blood pressure and to go through his annual wellness exam.  Patient has been working on lifestyle management to get his blood pressure under control and just returns from having been in [place] and [place] where he got to do a great deal of walking which is obviously helped his blood pressure.  We did talk about his weight and he is going to start working on that as well.  We will plan to see him back in about 3 months with labs prior to the visit.    Health Maintenance: Completed    Essential hypertension  This is a chronic health problem that is well controlled with current medications and lifestyle measures. Bp is good at 124/76.  The patient denies chest pain, shortness of breath or dyspnea on exertion.      Obesity (BMI 35.0-39.9 without comorbidity) ([facility])  This is a chronic health problem that is uncontrolled with current medications and lifestyle measures. He is working on weight loss    Wellness examination  Patient here today to do a wellness exam and follow-up on his hypertension that has been borderline the last few times he had his blood pressure checked.      Patient Active Problem List    Diagnosis Date Noted   • Wellness examination 08/02/2019   • Olecranon bursitis of left elbow 06/27/2018   • Essential hypertension 05/11/2018   • Obesity (BMI 35.0-39.9 without comorbidity) ([facility]) 05/11/2018      Allergies:Ace inhibitors    Current Outpatient Medications   Medication Sig Dispense Refill   • losartan (COZAAR) 100 MG Tab TAKE ONE TABLET BY MOUTH EVERY DAY 90 Tab 3   • amLODIPine (NORVASC) 5 MG Tab Take 1 Tab by mouth every day. 90 Tab 0   • doxycycline (VIBRAMYCIN) 100 MG Tab      •  meloxicam (MOBIC) 15 MG tablet        No current facility-administered medications for this visit.        Social History     Tobacco Use   • Smoking status: Never Smoker   • Smokeless tobacco: Never Used   Substance Use Topics   • Alcohol use: Yes     Alcohol/week: 0.6 oz     Types: 1 Cans of beer per week     Comment: social   • Drug use: No     Social History     Social History Narrative   • Not on file       Family History   Problem Relation Age of Onset   • Colon Cancer Mother    • Hypertension Mother    • Hyperlipidemia Mother    • No Known Problems Father         ROS:     - Constitutional:  Negative for fever, chills, unexpected weight change, and fatigue/generalized weakness.    - HEENT:  Negative for headaches, vision changes, hearing changes, ear pain, ear discharge, rhinorrhea, sinus congestion, sore throat, and neck pain.      - Respiratory: Negative for cough, sputum production, chest congestion, dyspnea, wheezing, and crackles.      - Cardiovascular: Negative for chest pain, palpitations, orthopnea, and bilateral lower extremity edema.     - Gastrointestinal: Negative for heartburn, nausea, vomiting, abdominal pain, hematochezia, melena, diarrhea, constipation, and greasy/foul-smelling stools.     - Genitourinary: Negative for dysuria, polyuria, hematuria, pyuria, urinary urgency, and urinary incontinence.     - Musculoskeletal: Negative for myalgias, back pain, and joint pain.     - Skin: Negative for rash, itching, cyanotic skin color change.     - Neurological: Negative for dizziness, tingling, tremors, focal sensory deficit, focal weakness and headaches.     - Endo/Heme/Allergies: Does not bruise/bleed easily.     - Psychiatric/Behavioral: Negative for depression, suicidal/homicidal ideation and memory loss.          - NOTE: All other systems reviewed and are negative, except as in HPI.      Exam:    /76 (BP Location: Left arm, Patient Position: Sitting, BP Cuff Size: Adult)   Pulse 71    "Temp 36.8 °C (98.2 °F) (Temporal)   Resp 14   Ht 1.778 m (5' 10\")   Wt 116.6 kg (257 lb)   SpO2 96%  Body mass index is 36.88 kg/m².    General:  Well nourished, well developed male in NAD  Head is grossly normal.  Neck: Supple without JVD or bruit. Thyroid is not enlarged.  Pulmonary: Clear to ausculation and percussion.  Normal effort. No rales, ronchi, or wheezing.  Cardiovascular: Regular rate and rhythm without murmur. Carotid and radial pulses are intact and equal bilaterally.  Extremities: no clubbing, cyanosis, or edema.    Please note that this dictation was created using voice recognition software. I have made every reasonable attempt to correct obvious errors, but I expect that there are errors of grammar and possibly content that I did not discover before finalizing the note.    Assessment/Plan:  1. Wellness examination  This patient is doing very well working on lifestyle management.  He will continue.  I have challenged him to try and lose 1/2 pound to a pound a week for the coming 3 months.  We want to see him down to 251 we see him back.    2. Essential hypertension  Well-controlled with current medications.  We will check lab work and see him back within 3 months.  - Comp Metabolic Panel; Future  - Lipid Profile; Future    3. Obesity (BMI 35.0-39.9 without comorbidity) ([facility])  Working on lifestyle management  - Patient identified as having weight management issue.  Appropriate orders and counseling given.    4. Encounter for hepatitis C screening test for low risk patient  Patient will do the hepatitis C screening we will notify of results.  - HEP C VIRUS ANTIBODY; Future      Patient was to get a Tdap today but he asked to postpone it because he is playing in a golf tournament tomorrow.  We will do it for him when we see him back in November.       "

## 2019-08-19 ENCOUNTER — OFFICE VISIT (OUTPATIENT)
Dept: MEDICAL GROUP | Facility: MEDICAL CENTER | Age: 59
End: 2019-08-19
Payer: COMMERCIAL

## 2019-08-19 VITALS
RESPIRATION RATE: 14 BRPM | OXYGEN SATURATION: 94 % | DIASTOLIC BLOOD PRESSURE: 72 MMHG | HEIGHT: 70 IN | WEIGHT: 258.3 LBS | TEMPERATURE: 97.3 F | SYSTOLIC BLOOD PRESSURE: 124 MMHG | HEART RATE: 93 BPM | BODY MASS INDEX: 36.98 KG/M2

## 2019-08-19 DIAGNOSIS — J01.10 ACUTE NON-RECURRENT FRONTAL SINUSITIS: ICD-10-CM

## 2019-08-19 PROCEDURE — 99214 OFFICE O/P EST MOD 30 MIN: CPT | Performed by: FAMILY MEDICINE

## 2019-08-19 RX ORDER — AMLODIPINE BESYLATE 5 MG/1
5 TABLET ORAL DAILY
Qty: 90 TAB | Refills: 0 | Status: SHIPPED | OUTPATIENT
Start: 2019-08-19 | End: 2020-01-17

## 2019-08-19 RX ORDER — LOSARTAN POTASSIUM 100 MG/1
100 TABLET ORAL
Qty: 90 TAB | Refills: 3 | Status: SHIPPED | OUTPATIENT
Start: 2019-08-19 | End: 2020-01-17 | Stop reason: SDUPTHER

## 2019-08-19 RX ORDER — CEFUROXIME AXETIL 250 MG/1
250 TABLET ORAL 2 TIMES DAILY
Qty: 20 TAB | Refills: 0 | Status: SHIPPED | OUTPATIENT
Start: 2019-08-19 | End: 2019-08-29

## 2019-08-19 NOTE — ASSESSMENT & PLAN NOTE
This is a new problem that is been ongoing for nearly 2 weeks.  Patient had to drive his son over to [place] they drove for 20 hours and then he flew back.  He had rhinorrhea at that time now he has sinus congestion with a headache over the frontal and maxillary sinuses.  He is also now developed a cough that is productive of a purulent sputum.  Patient had fevers running between  throughout the 2-week time frame period.  Patient has not tried anything over-the-counter to help with this.  He states that the symptoms continue to worsen and the headache is making it hard for him to concentrate.  Sputum he is producing is the same as what he is blowing out of his nose.

## 2019-08-19 NOTE — PROGRESS NOTES
CC:The encounter diagnosis was Acute non-recurrent frontal sinusitis.    HISTORY OF PRESENT ILLNESS: Patient is a 58 y.o. male established patient who presents today to talk about his current acute illness that is been ongoing for about 2 weeks.    Health Maintenance: Completed    Acute non-recurrent frontal sinusitis  This is a new problem that is been ongoing for nearly 2 weeks.  Patient had to drive his son over to [place] they drove for 20 hours and then he flew back.  He had rhinorrhea at that time now he has sinus congestion with a headache over the frontal and maxillary sinuses.  He is also now developed a cough that is productive of a purulent sputum.  Patient had fevers running between  throughout the 2-week time frame period.  Patient has not tried anything over-the-counter to help with this.  He states that the symptoms continue to worsen and the headache is making it hard for him to concentrate.  Sputum he is producing is the same as what he is blowing out of his nose.      Patient Active Problem List    Diagnosis Date Noted   • Acute non-recurrent frontal sinusitis 08/19/2019   • Wellness examination 08/02/2019   • Olecranon bursitis of left elbow 06/27/2018   • Essential hypertension 05/11/2018   • Obesity (BMI 35.0-39.9 without comorbidity) ([facility]) 05/11/2018      Allergies:Ace inhibitors    Current Outpatient Medications   Medication Sig Dispense Refill   • cefUROXime (CEFTIN) 250 MG Tab Take 1 Tab by mouth 2 times a day for 10 days. 20 Tab 0   • losartan (COZAAR) 100 MG Tab Take 1 Tab by mouth every day. 90 Tab 3   • amLODIPine (NORVASC) 5 MG Tab Take 1 Tab by mouth every day. 90 Tab 0   • meloxicam (MOBIC) 15 MG tablet        No current facility-administered medications for this visit.        Social History     Tobacco Use   • Smoking status: Never Smoker   • Smokeless tobacco: Never Used   Substance Use Topics   • Alcohol use: Yes     Alcohol/week: 0.6 oz     Types: 1 Cans of beer per week  "    Comment: social   • Drug use: No     Social History     Social History Narrative   • Not on file       Family History   Problem Relation Age of Onset   • Colon Cancer Mother    • Hypertension Mother    • Hyperlipidemia Mother    • No Known Problems Father         ROS:     - Constitutional: Positive for fevers and chills along with fatigue but no generalized weakness.    - HEENT: Positive for headache, rhinorrhea, left ear pain and sinus congestion, denies sore throat or neck pain.       - Respiratory: Positive for productive cough but no shortness of breath or wheezing.       - Cardiovascular: Negative for chest pain, palpitations, orthopnea, and bilateral lower extremity edema.     - Gastrointestinal: Negative for heartburn, nausea, vomiting, abdominal pain, hematochezia, melena, diarrhea, constipation, and greasy/foul-smelling stools.     - Genitourinary: Negative for dysuria, polyuria, hematuria, pyuria, urinary urgency, and urinary incontinence.     - Musculoskeletal: Negative for myalgias, back pain, and joint pain.     - Skin: Negative for rash, itching, cyanotic skin color change.     - Neurological: Negative for dizziness, tingling, tremors, focal sensory deficit, focal weakness and headaches.     - Endo/Heme/Allergies: Does not bruise/bleed easily.     - Psychiatric/Behavioral: Negative for depression, suicidal/homicidal ideation and memory loss.          - NOTE: All other systems reviewed and are negative, except as in HPI.      Exam:    /72 (BP Location: Left arm, Patient Position: Sitting, BP Cuff Size: Adult)   Pulse 93   Temp 36.3 °C (97.3 °F) (Temporal)   Resp 14   Ht 1.778 m (5' 10\")   Wt 117.2 kg (258 lb 4.8 oz)   SpO2 94%  Body mass index is 37.06 kg/m².    General:  Well nourished, well developed male in NAD  HEENT: Eyes conjunctiva is clear, lids without ptosis, pupils equal round and reactive to light and accommodation.  Ears normal shape and contour, canals are clear " bilaterally, TMs with erythema on the left TM without effusion and right TM is totally normal.  Nasal mucosa edematous and erythematous with green rhinorrhea  Oropharynx benign.  Sinuses (frontal and maxillary) tender to palpation.  Head is grossly normal.  Neck: Supple without JVD or bruit. Thyroid is not enlarged.  Pulmonary: Clear to ausculation and percussion.  Normal effort. No rales, ronchi, or wheezing.  Cardiovascular: Regular rate and rhythm without murmur. Carotid and radial pulses are intact and equal bilaterally.  Extremities: no clubbing, cyanosis, or edema.    Please note that this dictation was created using voice recognition software. I have made every reasonable attempt to correct obvious errors, but I expect that there are errors of grammar and possibly content that I did not discover before finalizing the note.    Assessment/Plan:  1. Acute non-recurrent frontal sinusitis  Uncontrolled, we will treat with Ceftin 250 mg p.o. twice daily for a full 10 days.  Patient also needs to  Mucinex over-the-counter and increase his fluids to thin his congestion.

## 2019-08-23 ENCOUNTER — PATIENT MESSAGE (OUTPATIENT)
Dept: MEDICAL GROUP | Facility: MEDICAL CENTER | Age: 59
End: 2019-08-23

## 2019-08-26 ENCOUNTER — OFFICE VISIT (OUTPATIENT)
Dept: MEDICAL GROUP | Facility: MEDICAL CENTER | Age: 59
End: 2019-08-26
Payer: COMMERCIAL

## 2019-08-26 VITALS
HEART RATE: 70 BPM | TEMPERATURE: 97.8 F | HEIGHT: 70 IN | SYSTOLIC BLOOD PRESSURE: 138 MMHG | OXYGEN SATURATION: 96 % | DIASTOLIC BLOOD PRESSURE: 72 MMHG | BODY MASS INDEX: 36.93 KG/M2 | RESPIRATION RATE: 14 BRPM | WEIGHT: 257.94 LBS

## 2019-08-26 DIAGNOSIS — J01.10 ACUTE NON-RECURRENT FRONTAL SINUSITIS: ICD-10-CM

## 2019-08-26 LAB
FLUAV+FLUBV AG SPEC QL IA: NEGATIVE
INT CON NEG: NEGATIVE
INT CON POS: POSITIVE

## 2019-08-26 PROCEDURE — 87804 INFLUENZA ASSAY W/OPTIC: CPT | Performed by: FAMILY MEDICINE

## 2019-08-26 PROCEDURE — 99214 OFFICE O/P EST MOD 30 MIN: CPT | Performed by: FAMILY MEDICINE

## 2019-08-26 RX ORDER — AMOXICILLIN AND CLAVULANATE POTASSIUM 875; 125 MG/1; MG/1
1 TABLET, FILM COATED ORAL 2 TIMES DAILY
Qty: 14 TAB | Refills: 0 | Status: SHIPPED | OUTPATIENT
Start: 2019-08-26 | End: 2019-09-02

## 2019-08-26 NOTE — PROGRESS NOTES
CC:The encounter diagnosis was Acute non-recurrent frontal sinusitis.    HISTORY OF PRESENT ILLNESS: Patient is a 58 y.o. male established patient who presents today to talk about his frontal sinusitis that we saw him for last week.  He was out of town over the weekend and states that he feels his cough is gotten worse.  He would like to have a stronger antibiotic.  He is also continuing to have fevers and chills all low-grade.  He is hot and clammy here in our office.  I did go ahead and do an influenza swab which is negative.    Health Maintenance: Completed    Acute non-recurrent frontal sinusitis  This is the second week that this patient has continued to have the symptoms.  He has been taking the Ceftin 250 mg twice a day along with ibuprofen for low-grade fevers  and then feeling clammy which is actually doing right here without a fever.  Patient has little to no energy.  He says the worst part about this is how lack of energy he feels and that he does not feel renewed after sleeping.  He has had a cough that at times was productive of green sputum but also at times he is coughed so hard he is actually coughed up undigested food.  We will go ahead and do a flu swab today.      Patient Active Problem List    Diagnosis Date Noted   • Acute non-recurrent frontal sinusitis 08/19/2019   • Wellness examination 08/02/2019   • Olecranon bursitis of left elbow 06/27/2018   • Essential hypertension 05/11/2018   • Obesity (BMI 35.0-39.9 without comorbidity) ([facility]) 05/11/2018      Allergies:Ace inhibitors    Current Outpatient Medications   Medication Sig Dispense Refill   • amoxicillin-clavulanate (AUGMENTIN) 875-125 MG Tab Take 1 Tab by mouth 2 times a day for 7 days. 14 Tab 0   • cefUROXime (CEFTIN) 250 MG Tab Take 1 Tab by mouth 2 times a day for 10 days. 20 Tab 0   • losartan (COZAAR) 100 MG Tab Take 1 Tab by mouth every day. 90 Tab 3   • amLODIPine (NORVASC) 5 MG Tab Take 1 Tab by mouth every day. 90 Tab 0   •  "meloxicam (MOBIC) 15 MG tablet        No current facility-administered medications for this visit.        Social History     Tobacco Use   • Smoking status: Never Smoker   • Smokeless tobacco: Never Used   Substance Use Topics   • Alcohol use: Yes     Alcohol/week: 0.6 oz     Types: 1 Cans of beer per week     Comment: social   • Drug use: No     Social History     Social History Narrative   • Not on file       Family History   Problem Relation Age of Onset   • Colon Cancer Mother    • Hypertension Mother    • Hyperlipidemia Mother    • No Known Problems Father         ROS:     - Constitutional: Positive for low-grade fevers and chills and fatigue.     - HEENT: Positive for sinus congestion, headache over the maxillary sinuses, intermittent ear pain, intermittent sore throat but no neck pain and no stiffness of his neck.       - Respiratory: Continued cough with green sputum similar to what is coming out of his nose.       - Cardiovascular: Negative for chest pain, palpitations, orthopnea, and bilateral lower extremity edema.     - Gastrointestinal: Negative for heartburn, nausea, vomiting, abdominal pain, hematochezia, melena, diarrhea, constipation, and greasy/foul-smelling stools.     - Genitourinary: Negative for dysuria, polyuria, hematuria, pyuria, urinary urgency, and urinary incontinence.     - Musculoskeletal: Negative for myalgias, back pain, and joint pain.     - Skin: No rash.           - NOTE: All other systems reviewed and are negative, except as in HPI.      Exam:    /72 (BP Location: Left arm, Patient Position: Sitting, BP Cuff Size: Adult)   Pulse 70   Temp 36.6 °C (97.8 °F) (Temporal)   Resp 14   Ht 1.778 m (5' 10\")   Wt 117 kg (257 lb 15 oz)   SpO2 96%  Body mass index is 37.01 kg/m².    General:  Well nourished, well developed male in NAD  HEENT: Eyes conjunctiva is clear, lids without ptosis, pupils equal round and reactive to light and accommodation.  Ears normal shape and contour, " canals are clear bilaterally, TMs with good light reflex and appear normal.  Nasal mucosa erythematous and edematous with clear rhinorrhea.  Oropharynx benign.  Sinuses (frontal and maxillary) tender to palpation.  Head is grossly normal.  Neck: Supple without JVD or bruit. Thyroid is not enlarged.  Pulmonary: Clear to ausculation and percussion.  Normal effort. No rales, ronchi, or wheezing.  Cardiovascular: Regular rate and rhythm without murmur. Carotid and radial pulses are intact and equal bilaterally.  Extremities: no clubbing, cyanosis, or edema.  Influenza swab: Negative for influenza a and B.  Please note that this dictation was created using voice recognition software. I have made every reasonable attempt to correct obvious errors, but I expect that there are errors of grammar and possibly content that I did not discover before finalizing the note.    Assessment/Plan:  1. Acute non-recurrent frontal sinusitis  Uncontrolled, we will switch to Augmentin 875 twice daily for a full 10 days.  Patient to increase his fluids as well.  - POCT Influenza A/B

## 2019-08-26 NOTE — ASSESSMENT & PLAN NOTE
This is the second week that this patient has continued to have the symptoms.  He has been taking the Ceftin 250 mg twice a day along with ibuprofen for low-grade fevers  and then feeling clammy which is actually doing right here without a fever.  Patient has little to no energy.  He says the worst part about this is how lack of energy he feels and that he does not feel renewed after sleeping.  He has had a cough that at times was productive of green sputum but also at times he is coughed so hard he is actually coughed up undigested food.  We will go ahead and do a flu swab today.

## 2019-08-28 ENCOUNTER — PATIENT MESSAGE (OUTPATIENT)
Dept: MEDICAL GROUP | Facility: MEDICAL CENTER | Age: 59
End: 2019-08-28

## 2019-08-28 NOTE — TELEPHONE ENCOUNTER
From: Arias Nath  To: Leesa Brewer M.D.  Sent: 8/28/2019 11:27 AM PDT  Subject: Prescription Question    Bandar Landers,    Starting to feel Human again. Still a little tired in the morning and late afternoon, but today my cough is not as bad. Still stuffy but actually have a fair amount of energy today. Hopefully I have turned the corner back to normal health! Linda  ----- Message -----  From: Leesa Brewer M.D.  Sent: 8/26/2019 6:58 AM PDT  To: Arias Nath  Subject: RE: Prescription Question  Lloyd Bianchi  The fact that the antibiotics did not make it better indicates this is either allergies or a viral infection. You could treated with any of the over-the-counter cold and sinus medications. You may find that helpful with your symptomatology.  Take care, Dr. Landers      ----- Message -----   From: Arias Nath   Sent: 8/23/2019 1:25 PM PDT   To: Leesa Brewer M.D.  Subject: Prescription Question    Lloyd Portillo. Since I saw you Monday this sinus / ear infection does not seam to be getting better. Taking the antibiotic you gave me but still am a long way from feeling normal. I am supposed to fly to [place] this weekend for a family thing. Debating whether to go. Have missed 3 days of work with zero energy. Any shot there is a stronger antibiotic or something to fight this thing. Also the cough is making it hard to sleep. Let me know.     Thanks. Linda Nath

## 2019-08-30 ENCOUNTER — PATIENT MESSAGE (OUTPATIENT)
Dept: MEDICAL GROUP | Facility: MEDICAL CENTER | Age: 59
End: 2019-08-30

## 2019-08-30 NOTE — TELEPHONE ENCOUNTER
From: Arias Nath  To: Leesa Brewer M.D.  Sent: 8/30/2019 11:51 AM PDT  Subject: Prescription Question    Hey Bandar,    So its Friday and I do feel better. (probably at like 75% of normal!). However, one new symptom has popped up the last few days and that is Diarrhea. Not a lot is coming out solid. My appetite is sort of back, but the diarrhea has been really bad the last few days...Any suggestions? Linda  ----- Message -----  From: Leesa Brewer M.D.  Sent: 8/28/2019 11:50 AM PDT  To: Arias Nath  Subject: RE: Prescription Question  YAY, have my fingers and toes crossed you are better by the weekend.  Take careDr. Landers      ----- Message -----   From: Arias Nath   Sent: 8/28/2019 11:27 AM PDT   To: Leesa Brewer M.D.  Subject: Prescription Question    Bandar Landers,    Starting to feel Human again. Still a little tired in the morning and late afternoon, but today my cough is not as bad. Still stuffy but actually have a fair amount of energy today. Hopefully I have turned the corner back to normal health! Linda  ----- Message -----  From: Leesa Brewer M.D.  Sent: 8/26/2019 6:58 AM PDT  To: Arias Nath  Subject: RE: Prescription Question  Hi Arias  The fact that the antibiotics did not make it better indicates this is either allergies or a viral infection. You could treated with any of the over-the-counter cold and sinus medications. You may find that helpful with your symptomatology.  Dr. Leesa Kirk      ----- Message -----   From: Arias Nath   Sent: 8/23/2019 1:25 PM PDT   To: Leesa Brewer M.D.  Subject: Prescription Question    Lloyd Portillo. Since I saw you Monday this sinus / ear infection does not seam to be getting better. Taking the antibiotic you gave me but still am a long way from feeling normal. I am supposed to fly to [place] this weekend for a family thing. Debating whether to go. Have missed 3 days of work with zero energy. Any shot there is a stronger  antibiotic or something to fight this thing. Also the cough is making it hard to sleep. Let me know.     Thanks. Linda Nath

## 2019-08-30 NOTE — TELEPHONE ENCOUNTER
From: Arias Nath  To: Leesa Brewer M.D.  Sent: 8/30/2019 4:01 PM PDT  Subject: Prescription Question    Doc,    Quick question. I have that Amoxacylan that I have a 5 more days of. Should I keep taking that or another antibiotic like vancomycin. A friend who is a doctor back [facility] said that is more GI friendly.    Let me know what you think. Thanks, Linda  ----- Message -----  From: Leesa Brewer M.D.  Sent: 8/30/2019 1:18 PM PDT  To: Arias Nath  Subject: RE: Prescription Question  Immodium, BRAT diet: Bananas, rice, applesauce and dry toast for at least one meal a day. Should firm up in the next few days. If it does not, I need to hear from you. You are at risk for a type of colitis called C. difficile because you have been on antibiotics.  Take care, Dr. Landers      ----- Message -----   From: Arias Nath   Sent: 8/30/2019 11:51 AM PDT   To: Leesa Brewer M.D.  Subject: Prescription Question    Hey Doc,    So its Friday and I do feel better. (probably at like 75% of normal!). However, one new symptom has popped up the last few days and that is Diarrhea. Not a lot is coming out solid. My appetite is sort of back, but the diarrhea has been really bad the last few days...Any suggestions? Linda  ----- Message -----  From: Leesa Brewer M.D.  Sent: 8/28/2019 11:50 AM PDT  To: Arias Nath  Subject: RE: Prescription Question  YAY, have my fingers and toes crossed you are better by the weekend.  Take care, Dr. Landers      ----- Message -----   From: Arias Nath   Sent: 8/28/2019 11:27 AM PDT   To: Leesa Brewer M.D.  Subject: Prescription Question    Bandar Landers,    Starting to feel Human again. Still a little tired in the morning and late afternoon, but today my cough is not as bad. Still stuffy but actually have a fair amount of energy today. Hopefully I have turned the corner back to normal health! Linda  ----- Message -----  From: Leesa Brewer M.D.  Sent: 8/26/2019 6:58 AM  PDT  To: Arias Nath  Subject: RE: Prescription Question  Lloyd Bianchi  The fact that the antibiotics did not make it better indicates this is either allergies or a viral infection. You could treated with any of the over-the-counter cold and sinus medications. You may find that helpful with your symptomatology.  Take care, Dr. Landers      ----- Message -----   From: Arias Nath   Sent: 8/23/2019 1:25 PM PDT   To: Leesa Brewer M.D.  Subject: Prescription Question    Hi Doc. Since I saw you Monday this sinus / ear infection does not seam to be getting better. Taking the antibiotic you gave me but still am a long way from feeling normal. I am supposed to fly to [place] this weekend for a family thing. Debating whether to go. Have missed 3 days of work with zero energy. Any shot there is a stronger antibiotic or something to fight this thing. Also the cough is making it hard to sleep. Let me know.     Thanks. Linda Nath

## 2019-09-03 ENCOUNTER — OFFICE VISIT (OUTPATIENT)
Dept: MEDICAL GROUP | Facility: MEDICAL CENTER | Age: 59
End: 2019-09-03
Payer: COMMERCIAL

## 2019-09-03 ENCOUNTER — HOSPITAL ENCOUNTER (OUTPATIENT)
Dept: LAB | Facility: MEDICAL CENTER | Age: 59
End: 2019-09-03
Attending: FAMILY MEDICINE
Payer: COMMERCIAL

## 2019-09-03 ENCOUNTER — HOSPITAL ENCOUNTER (OUTPATIENT)
Facility: MEDICAL CENTER | Age: 59
End: 2019-09-03
Attending: FAMILY MEDICINE
Payer: COMMERCIAL

## 2019-09-03 VITALS
SYSTOLIC BLOOD PRESSURE: 144 MMHG | DIASTOLIC BLOOD PRESSURE: 78 MMHG | BODY MASS INDEX: 35.93 KG/M2 | RESPIRATION RATE: 14 BRPM | OXYGEN SATURATION: 100 % | HEART RATE: 107 BPM | HEIGHT: 70 IN | TEMPERATURE: 98.4 F | WEIGHT: 251 LBS

## 2019-09-03 DIAGNOSIS — R53.82 CHRONIC FATIGUE: ICD-10-CM

## 2019-09-03 DIAGNOSIS — E55.9 VITAMIN D DEFICIENCY: ICD-10-CM

## 2019-09-03 DIAGNOSIS — R19.7 DIARRHEA OF PRESUMED INFECTIOUS ORIGIN: ICD-10-CM

## 2019-09-03 LAB
25(OH)D3 SERPL-MCNC: 26 NG/ML (ref 30–100)
ALBUMIN SERPL BCP-MCNC: 3.9 G/DL (ref 3.2–4.9)
ALBUMIN/GLOB SERPL: 1.1 G/DL
ALP SERPL-CCNC: 87 U/L (ref 30–99)
ALT SERPL-CCNC: 49 U/L (ref 2–50)
ANION GAP SERPL CALC-SCNC: 8 MMOL/L (ref 0–11.9)
AST SERPL-CCNC: 27 U/L (ref 12–45)
BASOPHILS # BLD AUTO: 0.9 % (ref 0–1.8)
BASOPHILS # BLD: 0.1 K/UL (ref 0–0.12)
BILIRUB SERPL-MCNC: 0.5 MG/DL (ref 0.1–1.5)
BUN SERPL-MCNC: 13 MG/DL (ref 8–22)
CALCIUM SERPL-MCNC: 9.6 MG/DL (ref 8.5–10.5)
CHLORIDE SERPL-SCNC: 103 MMOL/L (ref 96–112)
CO2 SERPL-SCNC: 26 MMOL/L (ref 20–33)
CREAT SERPL-MCNC: 0.94 MG/DL (ref 0.5–1.4)
EOSINOPHIL # BLD AUTO: 0.2 K/UL (ref 0–0.51)
EOSINOPHIL NFR BLD: 1.7 % (ref 0–6.9)
ERYTHROCYTE [DISTWIDTH] IN BLOOD BY AUTOMATED COUNT: 42 FL (ref 35.9–50)
FASTING STATUS PATIENT QL REPORTED: NORMAL
GLOBULIN SER CALC-MCNC: 3.6 G/DL (ref 1.9–3.5)
GLUCOSE SERPL-MCNC: 104 MG/DL (ref 65–99)
HCT VFR BLD AUTO: 45.6 % (ref 42–52)
HGB BLD-MCNC: 14.3 G/DL (ref 14–18)
LYMPHOCYTES # BLD AUTO: 1.42 K/UL (ref 1–4.8)
LYMPHOCYTES NFR BLD: 12.2 % (ref 22–41)
MANUAL DIFF BLD: NORMAL
MCH RBC QN AUTO: 27.7 PG (ref 27–33)
MCHC RBC AUTO-ENTMCNC: 31.4 G/DL (ref 33.7–35.3)
MCV RBC AUTO: 88.2 FL (ref 81.4–97.8)
MONOCYTES # BLD AUTO: 1.11 K/UL (ref 0–0.85)
MONOCYTES NFR BLD AUTO: 9.6 % (ref 0–13.4)
MORPHOLOGY BLD-IMP: NORMAL
NEUTROPHILS # BLD AUTO: 8.77 K/UL (ref 1.82–7.42)
NEUTROPHILS NFR BLD: 73 % (ref 44–72)
NEUTS BAND NFR BLD MANUAL: 2.6 % (ref 0–10)
NRBC # BLD AUTO: 0 K/UL
NRBC BLD-RTO: 0 /100 WBC
PLATELET # BLD AUTO: 282 K/UL (ref 164–446)
PLATELET BLD QL SMEAR: NORMAL
PMV BLD AUTO: 8.3 FL (ref 9–12.9)
POTASSIUM SERPL-SCNC: 4 MMOL/L (ref 3.6–5.5)
PROT SERPL-MCNC: 7.5 G/DL (ref 6–8.2)
RBC # BLD AUTO: 5.17 M/UL (ref 4.7–6.1)
RBC BLD AUTO: NORMAL
SODIUM SERPL-SCNC: 137 MMOL/L (ref 135–145)
TSH SERPL DL<=0.005 MIU/L-ACNC: 0.54 UIU/ML (ref 0.38–5.33)
WBC # BLD AUTO: 11.6 K/UL (ref 4.8–10.8)

## 2019-09-03 PROCEDURE — 82306 VITAMIN D 25 HYDROXY: CPT

## 2019-09-03 PROCEDURE — 85027 COMPLETE CBC AUTOMATED: CPT

## 2019-09-03 PROCEDURE — 99214 OFFICE O/P EST MOD 30 MIN: CPT | Performed by: FAMILY MEDICINE

## 2019-09-03 PROCEDURE — 84443 ASSAY THYROID STIM HORMONE: CPT

## 2019-09-03 PROCEDURE — 87324 CLOSTRIDIUM AG IA: CPT

## 2019-09-03 PROCEDURE — 36415 COLL VENOUS BLD VENIPUNCTURE: CPT

## 2019-09-03 PROCEDURE — 80053 COMPREHEN METABOLIC PANEL: CPT

## 2019-09-03 PROCEDURE — 85007 BL SMEAR W/DIFF WBC COUNT: CPT

## 2019-09-03 PROCEDURE — 87493 C DIFF AMPLIFIED PROBE: CPT

## 2019-09-03 NOTE — ASSESSMENT & PLAN NOTE
Patient here today accompanied by his wife, Kayla who makes a comment that she is concerned about the amount of fatigue she sees her  dealing with.  He wakes up still feeling tired.  He does have a history of snoring, but had a previous work-up for this and was told he does not have sleep apnea.  We will go ahead and check a CBC and thyroid studies in light of how fatigued he is.  We will also get a comprehensive metabolic panel.

## 2019-09-03 NOTE — ASSESSMENT & PLAN NOTE
Patient here after being treated with 2 different rounds of antibiotics for presumed upper respiratory infection thought to be infectious.  His second antibiotic ended up being Augmentin which is caused him diarrhea.  He actually had gotten his stool to be more formed and now today he is back having diarrhea with some red mixed in the knee is uncertain as to whether or not it is Gatorade versus ulcer versus blood.  I would like for him to go ahead and do a specimen for C. difficile.  We will have him stop his Augmentin now and he can take Imodium according to box directions to see if that is helpful.  Once we have a chance to look for C. difficile if it is present we will go ahead and treat.

## 2019-09-03 NOTE — PROGRESS NOTES
CC:Diagnoses of Diarrhea of presumed infectious origin, Chronic fatigue, and Vitamin D deficiency were pertinent to this visit.    HISTORY OF PRESENT ILLNESS: Patient is a 58 y.o. male established patient who presents today to about the fact that he continues to have overwhelming fatigue and now having diarrhea most likely secondary to recent antibiotic being Augmentin but cannot rule out C. difficile colitis.    Health Maintenance: Completed    Diarrhea of presumed infectious origin  Patient here after being treated with 2 different rounds of antibiotics for presumed upper respiratory infection thought to be infectious.  His second antibiotic ended up being Augmentin which is caused him diarrhea.  He actually had gotten his stool to be more formed and now today he is back having diarrhea with some red mixed in the knee is uncertain as to whether or not it is Gatorade versus ulcer versus blood.  I would like for him to go ahead and do a specimen for C. difficile.  We will have him stop his Augmentin now and he can take Imodium according to box directions to see if that is helpful.  Once we have a chance to look for C. difficile if it is present we will go ahead and treat.    Chronic fatigue  Patient here today accompanied by his wife, Kayla who makes a comment that she is concerned about the amount of fatigue she sees her  dealing with.  He wakes up still feeling tired.  He does have a history of snoring, but had a previous work-up for this and was told he does not have sleep apnea.  We will go ahead and check a CBC and thyroid studies in light of how fatigued he is.  We will also get a comprehensive metabolic panel.    Vitamin D deficiency  Patient had a vitamin D level back in 2011 that was 22.  His previous provider put him on vitamin D supplementation.  He states he is taken it since.  I do not have a record of him ever having a normal vitamin D between 40-80.  We will check that with his other  labs.      Patient Active Problem List    Diagnosis Date Noted   • Diarrhea of presumed infectious origin 09/03/2019   • Chronic fatigue 09/03/2019   • Vitamin D deficiency 09/03/2019   • Acute non-recurrent frontal sinusitis 08/19/2019   • Wellness examination 08/02/2019   • Olecranon bursitis of left elbow 06/27/2018   • Essential hypertension 05/11/2018   • Obesity (BMI 35.0-39.9 without comorbidity) ([facility]) 05/11/2018      Allergies:Ace inhibitors    Current Outpatient Medications   Medication Sig Dispense Refill   • losartan (COZAAR) 100 MG Tab Take 1 Tab by mouth every day. 90 Tab 3   • amLODIPine (NORVASC) 5 MG Tab Take 1 Tab by mouth every day. 90 Tab 0   • meloxicam (MOBIC) 15 MG tablet        No current facility-administered medications for this visit.        Social History     Tobacco Use   • Smoking status: Never Smoker   • Smokeless tobacco: Never Used   Substance Use Topics   • Alcohol use: Yes     Alcohol/week: 0.6 oz     Types: 1 Cans of beer per week     Comment: social   • Drug use: No     Social History     Social History Narrative   • Not on file       Family History   Problem Relation Age of Onset   • Colon Cancer Mother    • Hypertension Mother    • Hyperlipidemia Mother    • No Known Problems Father         ROS:     - Constitutional: Positive for fatigue and malaise but denies fevers and chills.     - HEENT:  Negative for headaches, vision changes, hearing changes, ear pain, ear discharge, rhinorrhea, sinus congestion, sore throat, and neck pain.      - Respiratory: Negative for cough, sputum production, chest congestion, dyspnea, wheezing, and crackles.      - Cardiovascular: Negative for chest pain, palpitations, orthopnea, and bilateral lower extremity edema.     - Gastrointestinal: Negative for heartburn, nausea, vomiting,  hematochezia, melena, , constipation, and greasy/foul-smelling stools.     - Genitourinary: Negative for dysuria, polyuria, hematuria, pyuria, urinary urgency, and  "urinary incontinence.     - Musculoskeletal: Negative for myalgias, back pain, and joint pain.     - Skin: Negative for rash, itching, cyanotic skin color change.     - Neurological: Negative for dizziness, tingling, tremors, focal sensory deficit, focal weakness and headaches.     - Endo/Heme/Allergies: Does not bruise/bleed easily.     - Psychiatric/Behavioral: Negative for depression, suicidal/homicidal ideation and memory loss.          - NOTE: All other systems reviewed and are negative, except as in HPI.      Exam:    /78 (BP Location: Left arm, Patient Position: Sitting, BP Cuff Size: Adult)   Pulse (!) 107   Temp 36.9 °C (98.4 °F) (Temporal)   Resp 14   Ht 1.778 m (5' 10\")   Wt 113.9 kg (251 lb)   SpO2 100%  Body mass index is 36.01 kg/m².    General:  We appears fatigued and obviously does not feel well.    Head is grossly normal.  Neck: Supple without JVD or bruit. Thyroid is not enlarged.  Pulmonary: Clear to ausculation and percussion.  Normal effort. No rales, ronchi, or wheezing.  Cardiovascular: Regular rate and rhythm without murmur. Carotid and radial pulses are intact and equal bilaterally.  Extremities: no clubbing, cyanosis, or edema.  Patient was seen for 25 minutes face to face of which, 20 minutes was spent counseling regarding medications interactions and side effects as well as the possible recent problems with diarrhea and overwhelming fatigue..    Please note that this dictation was created using voice recognition software. I have made every reasonable attempt to correct obvious errors, but I expect that there are errors of grammar and possibly content that I did not discover before finalizing the note.    Assessment/Plan:  1. Diarrhea of presumed infectious origin  Controlled, patient will stop Augmentin at this time.  We will check for C. difficile and treat if present.  - C Diff by PCR rflx Toxin; Future    2. Chronic fatigue  Uncontrolled, doing blood work to determine " whether or not there is any etiology other than her current illness to explain fatigue  - CBC WITH DIFFERENTIAL; Future  - Comp Metabolic Panel; Future  - TSH WITH REFLEX TO FT4; Future    3. Vitamin D deficiency  Uncontrolled, patient on vitamin D supplementation but I need to be certain this amount is normal.  - VITAMIN D,25 HYDROXY; Future

## 2019-09-03 NOTE — ASSESSMENT & PLAN NOTE
Patient had a vitamin D level back in 2011 that was 22.  His previous provider put him on vitamin D supplementation.  He states he is taken it since.  I do not have a record of him ever having a normal vitamin D between 40-80.  We will check that with his other labs.

## 2019-09-04 ENCOUNTER — TELEPHONE (OUTPATIENT)
Dept: URGENT CARE | Facility: MEDICAL CENTER | Age: 59
End: 2019-09-04

## 2019-09-04 LAB
C DIFF DNA SPEC QL NAA+PROBE: NEGATIVE
C DIFF TOX A+B STL QL IA: POSITIVE
C DIFF TOX GENS STL QL NAA+PROBE: NORMAL

## 2019-09-05 ENCOUNTER — PATIENT MESSAGE (OUTPATIENT)
Dept: MEDICAL GROUP | Facility: MEDICAL CENTER | Age: 59
End: 2019-09-05

## 2019-09-05 RX ORDER — VANCOMYCIN HYDROCHLORIDE 125 MG/1
125 CAPSULE ORAL 4 TIMES DAILY
Qty: 56 CAP | Refills: 0 | Status: SHIPPED | OUTPATIENT
Start: 2019-09-05 | End: 2019-09-19

## 2019-09-06 ENCOUNTER — TELEPHONE (OUTPATIENT)
Dept: MEDICAL GROUP | Facility: MEDICAL CENTER | Age: 59
End: 2019-09-06

## 2019-09-06 NOTE — TELEPHONE ENCOUNTER
Initial Anesthesia Post-op Note    Patient: Rosette Castro  Procedure(s) Performed: LEFT ELBOW EPICONDYLAR RELEASE  Anesthesia type: General    Vital Last Value   Temperature 36.9 °C (98.4 °F) (09/07/18 0818)   Pulse 80 (09/07/18 0818)   Respiratory Rate 16 (09/07/18 0818)   Non-Invasive   Blood Pressure 145/75 (09/07/18 0818)   Arterial  Blood Pressure     Pulse Oximetry 94 % (09/07/18 0818)     Last 24 I/O:   Intake/Output Summary (Last 24 hours) at 09/07/18 1030  Last data filed at 09/07/18 0945   Gross per 24 hour   Intake              250 ml   Output                0 ml   Net              250 ml       PATIENT LOCATION: PACU Phase 1  POST-OP VITAL SIGNS: stable  LEVEL OF CONSCIOUSNESS: answers questions appropriately, awake and participates in exam  RESPIRATORY STATUS: spontaneous ventilation and unassisted  CARDIOVASCULAR: blood pressure returned to baseline  HYDRATION: euvolemic    PAIN MANAGEMENT: well controlled  AIRWAY PATENCY: patent  POST-OP ASSESSMENT: no complications, patient tolerated procedure well with no complications and no evidence of recall  COMPLICATIONS: none  HANDOFF:  Handoff to receiving nurse was performed and questions were answered       MEDICATION PRIOR AUTHORIZATION NEEDED:    1. Name of Medication: vancomycin (VANCOCIN) 125 MG capsule     2. Requested By (Name of Pharmacy): [facility]     3. Is insurance on file current? In Chart    4. What is the name & phone number of the 3rd party payor? [facility]

## 2019-09-06 NOTE — TELEPHONE ENCOUNTER
DOCUMENTATION OF PAR STATUS:    1. Name of Medication & Dose: vancomycin (VANCOCIN) 125 MG capsule      2. Name of Prescription Coverage Company & phone #: [employer]     3. Date Prior Auth Submitted: 09/06/2019    4. What information was given to obtain insurance decision? In Chart    5. Prior Auth Status? Pending    6. Patient Notified: yes

## 2019-09-06 NOTE — TELEPHONE ENCOUNTER
From: Arias Nath  To: Leesa Brewer M.D.  Sent: 9/5/2019 8:09 PM PDT  Subject: RE:You have C. Diff colitis    i talked to [facility] and they approved it. So I started it this afternoon. Can I take a probiotic or eat frozen yogurt for lunch while taking this stuff?     Let me know.     Thanks, Linda    ----- Message -----  From: Leesa Brewer M.D.  Sent: 9/5/19, 6:49 PM  To: Arias Nath  Subject: RE:You have C. Diff colitis    Hi Arias,  I will have [facility] do the Prior Authorization first thing in the morning. Will have to wait until then to pick it up. Sorry for the wait!  \Thank you,  Dr. GALLEGOS      ----- Message -----   From: Arias Nath   Sent: 9/5/2019 11:26 AM PDT   To: Leesa Brewer M.D.  Subject: RE:You have C. Diff colitis    Wow...that's it!!!!! So there is nothing else going on other than this stupid C Diff????   Ok...will go get that and start on that....I have heard it is contagious. Do you want me to stay away from work etc? Or just wash my hands like crazy?    Linda  ----- Message -----  From: Leesa Brewer M.D.  Sent: 9/5/2019 11:24 AM PDT  To: Arias Nath  Subject: You have C. Diff colitis  I will sent the vancomycin to the pharmacy for you right now.  This should be rid of the diarrhea for you.  Thank you,  Dr. GALLEGOS

## 2019-09-09 ENCOUNTER — PATIENT MESSAGE (OUTPATIENT)
Dept: MEDICAL GROUP | Facility: MEDICAL CENTER | Age: 59
End: 2019-09-09

## 2019-09-09 NOTE — TELEPHONE ENCOUNTER
From: Arias Nath  To: Leesa Brewer M.D.  Sent: 9/9/2019 8:34 AM PDT  Subject: Test Result Question    Hi Doc,    Feeling much better. The vacomycin seams to be working. Still get a bit tired in the afternoon, but compared to last Tuesday when I saw you, I feel 100% better. So I'm getting there!  Linda  ----- Message -----  From: Leesa Brewer M.D.  Sent: 9/5/2019 9:40 PM PDT  To: Arias Nath  Subject: RE: Test Result Question  Yes, you can eat yogurt and probiotics with this med.  Take care, Dr. Landers      ----- Message -----   From: Arias Nath   Sent: 9/5/2019 10:14 AM PDT   To: Leesa Brewer M.D.  Subject: Test Result Question    HI Doc,    Feeling a lot better. Have been at work last couple of days. Feel 1000% better than when I saw you on Tuesday. Still stuffed up and stool is still a little runny (but getting better). Saw the test results. But of course, I don't totally understand them. Anything to be concerned about? Or is this thing just running its course and I am starting to feel better.   Let me know...Thanks, Linda

## 2019-09-17 ENCOUNTER — PATIENT MESSAGE (OUTPATIENT)
Dept: MEDICAL GROUP | Facility: MEDICAL CENTER | Age: 59
End: 2019-09-17

## 2019-09-19 NOTE — TELEPHONE ENCOUNTER
From: Arias Nath  To: Leesa Brewer M.D.  Sent: 9/17/2019 10:35 AM PDT  Subject: Test Result Question    Hejoselyn Portillo,    Close to 100%. Finishing the Vancomycin on Wednesday. Actually going to play some golf today for first time in a few weeks. Question. Do you want me to redo the blood work that you gave me during my check up before this whole thing started. Or does the blood work I did for this Cdiff thing suffice.    Let me know....Thanks for all your help! Linda  ----- Message -----  From: Leesa Brewer M.D.  Sent: 9/9/2019 12:15 PM PDT  To: Arias Nath  Subject: RE: Test Result Question  YAY thank you for the update, I thought of you several times over the weekend.  Take careDr. Landers      ----- Message -----   From: Arias Nath   Sent: 9/9/2019 8:34 AM PDT   To: Leesa Brewer M.D.  Subject: Test Result Question    Lloyd Portillo,    Feeling much better. The vacomycin seams to be working. Still get a bit tired in the afternoon, but compared to last Tuesday when I saw you, I feel 100% better. So I'm getting there!  Linda  ----- Message -----  From: Leesa Brewer M.D.  Sent: 9/5/2019 9:40 PM PDT  To: Arias Nath  Subject: RE: Test Result Question  Yes, you can eat yogurt and probiotics with this med.  Take careDr. Landers      ----- Message -----   From: Arias Nath   Sent: 9/5/2019 10:14 AM PDT   To: Leesa Brewer M.D.  Subject: Test Result Question    HI Doc,    Feeling a lot better. Have been at work last couple of days. Feel 1000% better than when I saw you on Tuesday. Still stuffed up and stool is still a little runny (but getting better). Saw the test results. But of course, I don't totally understand them. Anything to be concerned about? Or is this thing just running its course and I am starting to feel better.   Let me know...Thanks, Linda

## 2019-11-01 ENCOUNTER — HOSPITAL ENCOUNTER (OUTPATIENT)
Dept: LAB | Facility: MEDICAL CENTER | Age: 59
End: 2019-11-01
Attending: FAMILY MEDICINE
Payer: COMMERCIAL

## 2019-11-01 DIAGNOSIS — Z11.59 ENCOUNTER FOR HEPATITIS C SCREENING TEST FOR LOW RISK PATIENT: ICD-10-CM

## 2019-11-01 DIAGNOSIS — I10 ESSENTIAL HYPERTENSION: ICD-10-CM

## 2019-11-01 LAB
ALBUMIN SERPL BCP-MCNC: 4.2 G/DL (ref 3.2–4.9)
ALBUMIN/GLOB SERPL: 1.5 G/DL
ALP SERPL-CCNC: 80 U/L (ref 30–99)
ALT SERPL-CCNC: 30 U/L (ref 2–50)
ANION GAP SERPL CALC-SCNC: 9 MMOL/L (ref 0–11.9)
AST SERPL-CCNC: 20 U/L (ref 12–45)
BILIRUB SERPL-MCNC: 0.5 MG/DL (ref 0.1–1.5)
BUN SERPL-MCNC: 17 MG/DL (ref 8–22)
CALCIUM SERPL-MCNC: 8.9 MG/DL (ref 8.5–10.5)
CHLORIDE SERPL-SCNC: 108 MMOL/L (ref 96–112)
CHOLEST SERPL-MCNC: 172 MG/DL (ref 100–199)
CO2 SERPL-SCNC: 26 MMOL/L (ref 20–33)
CREAT SERPL-MCNC: 0.85 MG/DL (ref 0.5–1.4)
FASTING STATUS PATIENT QL REPORTED: NORMAL
GLOBULIN SER CALC-MCNC: 2.8 G/DL (ref 1.9–3.5)
GLUCOSE SERPL-MCNC: 97 MG/DL (ref 65–99)
HCV AB SER QL: NEGATIVE
HDLC SERPL-MCNC: 40 MG/DL
LDLC SERPL CALC-MCNC: 112 MG/DL
POTASSIUM SERPL-SCNC: 4 MMOL/L (ref 3.6–5.5)
PROT SERPL-MCNC: 7 G/DL (ref 6–8.2)
SODIUM SERPL-SCNC: 143 MMOL/L (ref 135–145)
TRIGL SERPL-MCNC: 98 MG/DL (ref 0–149)

## 2019-11-01 PROCEDURE — 80053 COMPREHEN METABOLIC PANEL: CPT

## 2019-11-01 PROCEDURE — 86803 HEPATITIS C AB TEST: CPT

## 2019-11-01 PROCEDURE — 36415 COLL VENOUS BLD VENIPUNCTURE: CPT

## 2019-11-01 PROCEDURE — 80061 LIPID PANEL: CPT

## 2019-11-06 ENCOUNTER — OFFICE VISIT (OUTPATIENT)
Dept: MEDICAL GROUP | Facility: MEDICAL CENTER | Age: 59
End: 2019-11-06
Payer: COMMERCIAL

## 2019-11-06 VITALS
WEIGHT: 254.85 LBS | RESPIRATION RATE: 14 BRPM | SYSTOLIC BLOOD PRESSURE: 122 MMHG | TEMPERATURE: 98.2 F | HEIGHT: 70 IN | HEART RATE: 66 BPM | OXYGEN SATURATION: 96 % | BODY MASS INDEX: 36.49 KG/M2 | DIASTOLIC BLOOD PRESSURE: 76 MMHG

## 2019-11-06 DIAGNOSIS — R53.82 CHRONIC FATIGUE: ICD-10-CM

## 2019-11-06 DIAGNOSIS — I10 ESSENTIAL HYPERTENSION: ICD-10-CM

## 2019-11-06 DIAGNOSIS — Z23 NEED FOR VACCINATION: ICD-10-CM

## 2019-11-06 DIAGNOSIS — E78.00 HYPERCHOLESTEROLEMIA: ICD-10-CM

## 2019-11-06 DIAGNOSIS — E55.9 VITAMIN D DEFICIENCY: ICD-10-CM

## 2019-11-06 PROBLEM — J01.10 ACUTE NON-RECURRENT FRONTAL SINUSITIS: Status: RESOLVED | Noted: 2019-08-19 | Resolved: 2019-11-06

## 2019-11-06 PROBLEM — R19.7 DIARRHEA OF PRESUMED INFECTIOUS ORIGIN: Status: RESOLVED | Noted: 2019-09-03 | Resolved: 2019-11-06

## 2019-11-06 PROCEDURE — 90715 TDAP VACCINE 7 YRS/> IM: CPT | Performed by: FAMILY MEDICINE

## 2019-11-06 PROCEDURE — 90471 IMMUNIZATION ADMIN: CPT | Performed by: FAMILY MEDICINE

## 2019-11-06 PROCEDURE — 99214 OFFICE O/P EST MOD 30 MIN: CPT | Mod: 25 | Performed by: FAMILY MEDICINE

## 2019-11-06 NOTE — ASSESSMENT & PLAN NOTE
This is been a problem for this patient back in September when he was diagnosed with a sinus infection and had 2 rounds of antibiotics then went on to do is developed C. difficile diarrhea.  After being treated for C. difficile he states it took over a month for his bowels to get back to normal during that time he had significant fatigue.  He also had some allergies were probably complicating things and keeping him from getting adequate rest.  He reports now he is completely back to normal getting ready to go to the [facility] and he will get his vitamin D up by getting plenty of sunshine.

## 2019-11-06 NOTE — ASSESSMENT & PLAN NOTE
This is a new problem for this patient but he is working on weight loss diet and exercise.  Total cholesterol 172, triglycerides are great at 98, HDL good at 40 LDL slightly high at 112.  That should come down with him working on his diet and exercise program.  We will plan to recheck in 3 months.

## 2019-11-06 NOTE — ASSESSMENT & PLAN NOTE
This is a chronic health problem that is well controlled with current medications and lifestyle measures.  His BP is excellent at 122/76.

## 2019-11-06 NOTE — PROGRESS NOTES
CC:Diagnoses of Need for vaccination, Chronic fatigue, Essential hypertension, Vitamin D deficiency, and Hypercholesterolemia were pertinent to this visit.    HISTORY OF PRESENT ILLNESS: Patient is a 58 y.o. male established patient who presents today to discuss his chronic health problems and his recent episode of C. difficile.  Patient did his blood work on 11/1/2019 and would like to review those results.      Chronic fatigue  This is been a problem for this patient back in September when he was diagnosed with a sinus infection and had 2 rounds of antibiotics then went on to do is developed C. difficile diarrhea.  After being treated for C. difficile he states it took over a month for his bowels to get back to normal during that time he had significant fatigue.  He also had some allergies were probably complicating things and keeping him from getting adequate rest.  He reports now he is completely back to normal getting ready to go to the [facility] and he will get his vitamin D up by getting plenty of sunshine.    Essential hypertension  This is a chronic health problem that is well controlled with current medications and lifestyle measures.  His BP is excellent at 122/76.    Vitamin D deficiency  This is a chronic health problem for this patient his vitamin D was low at 26.  He is now on 4000 international units daily.  We will plan to recheck that in another 3 to 4 months upon his return.    Hypercholesterolemia  This is a new problem for this patient but he is working on weight loss diet and exercise.  Total cholesterol 172, triglycerides are great at 98, HDL good at 40 LDL slightly high at 112.  That should come down with him working on his diet and exercise program.  We will plan to recheck in 3 months.      Patient Active Problem List    Diagnosis Date Noted   • Hypercholesterolemia 11/06/2019   • Chronic fatigue 09/03/2019   • Vitamin D deficiency 09/03/2019   • Wellness examination 08/02/2019   •  Olecranon bursitis of left elbow 06/27/2018   • Essential hypertension 05/11/2018   • Obesity (BMI 35.0-39.9 without comorbidity) ([facility]) 05/11/2018      Allergies:Ace inhibitors    Current Outpatient Medications   Medication Sig Dispense Refill   • losartan (COZAAR) 100 MG Tab Take 1 Tab by mouth every day. 90 Tab 3   • amLODIPine (NORVASC) 5 MG Tab Take 1 Tab by mouth every day. 90 Tab 0   • meloxicam (MOBIC) 15 MG tablet        No current facility-administered medications for this visit.        Social History     Tobacco Use   • Smoking status: Never Smoker   • Smokeless tobacco: Never Used   Substance Use Topics   • Alcohol use: Yes     Alcohol/week: 0.6 oz     Types: 1 Cans of beer per week     Comment: social   • Drug use: No     Social History     Patient does not qualify to have social determinant information on file (likely too young).   Social History Narrative   • Not on file       Family History   Problem Relation Age of Onset   • Colon Cancer Mother    • Hypertension Mother    • Hyperlipidemia Mother    • No Known Problems Father         ROS:     - Constitutional:  Negative for fever, chills, unexpected weight change, and fatigue/generalized weakness.    - HEENT:  Negative for headaches, vision changes, hearing changes, ear pain, ear discharge, rhinorrhea, sinus congestion, sore throat, and neck pain.      - Respiratory: Negative for cough, sputum production, chest congestion, dyspnea, wheezing, and crackles.      - Cardiovascular: Negative for chest pain, palpitations, orthopnea, and bilateral lower extremity edema.     - Gastrointestinal: Negative for heartburn, nausea, vomiting, abdominal pain, hematochezia, melena, diarrhea, constipation, and greasy/foul-smelling stools.     - Genitourinary: Negative for dysuria, polyuria, hematuria, pyuria, urinary urgency, and urinary incontinence.     - Musculoskeletal: Negative for myalgias, back pain, and joint pain.     - Skin: Negative for rash, itching,  "cyanotic skin color change.     - Neurological: Negative for dizziness, tingling, tremors, focal sensory deficit, focal weakness and headaches.     - Endo/Heme/Allergies: Does not bruise/bleed easily.     - Psychiatric/Behavioral: Negative for depression, suicidal/homicidal ideation and memory loss.          - NOTE: All other systems reviewed and are negative, except as in HPI.      Exam:    /76 (BP Location: Left arm, Patient Position: Sitting, BP Cuff Size: Adult)   Pulse 66   Temp 36.8 °C (98.2 °F) (Temporal)   Resp 14   Ht 1.778 m (5' 10\")   Wt 115.6 kg (254 lb 13.6 oz)   SpO2 96%  Body mass index is 36.57 kg/m².    General:  Well nourished, well developed male in NAD  Head is grossly normal.  Neck: Supple without JVD or bruit. Thyroid is not enlarged.  Pulmonary: Clear to ausculation and percussion.  Normal effort. No rales, ronchi, or wheezing.  Cardiovascular: Regular rate and rhythm without murmur. Carotid and radial pulses are intact and equal bilaterally.  Extremities: no clubbing, cyanosis, or edema.  LABS: 11/1/2019: Results reviewed and discussed with the patient, questions answered.    Please note that this dictation was created using voice recognition software. I have made every reasonable attempt to correct obvious errors, but I expect that there are errors of grammar and possibly content that I did not discover before finalizing the note.    Assessment/Plan:  1. Need for vaccination  Given today  - Tdap =>6yo IM    2. Chronic fatigue  Now resolved after getting his C. difficile treated    3. Essential hypertension  Controlled, continue with current meds and lifestyle.      4. Vitamin D deficiency  Uncontrolled but patient now on 4000 international units daily we will see if this is adequate with his next labs in 3 months  - VITAMIN D,25 HYDROXY; Future    5. Hypercholesterolemia  Uncontrolled, patient working on diet and exercise and we will have him continue that we will plan to see him " back in 3 months  - Comp Metabolic Panel; Future  - Lipid Profile; Future

## 2019-11-06 NOTE — ASSESSMENT & PLAN NOTE
This is a chronic health problem for this patient his vitamin D was low at 26.  He is now on 4000 international units daily.  We will plan to recheck that in another 3 to 4 months upon his return.

## 2019-12-05 NOTE — ASSESSMENT & PLAN NOTE
This is a chronic health problem that is well controlled with current medications and lifestyle measures. The patient denies chest pain, shortness of breath or dyspnea on exertion.  Blood pressure is excellent today at 120/76.  He will continue with current meds.   Anesthesia Type: 2% lidocaine without epinephrine Size Of Lesion In Cm: 1 Depth Of Biopsy: dermis Additional Anesthesia Volume In Cc (Will Not Render If 0): 0 Bill [id number] For Specimen Handling/Conveyance To Laboratory?: no Post-Care Instructions: I reviewed with the patient in detail post-care instructions. Patient is to keep the biopsy site dry overnight, and then apply bacitracin twice daily until healed. Patient may apply hydrogen peroxide soaks to remove any crusting. Silver Nitrate Text: The wound bed was treated with silver nitrate after the biopsy was performed. Billing Type: Third-Party Bill Curettage Text: The wound bed was treated with curettage after the biopsy was performed. Lab: 6 Body Location Override (Optional - Billing Will Still Be Based On Selected Body Map Location If Applicable): left lateral eyebrow Wound Care: Vaseline Biopsy Type: H and E Anesthesia Volume In Cc (Will Not Render If 0): 0.5 Detail Level: Detailed Cryotherapy Text: The wound bed was treated with cryotherapy after the biopsy was performed. Consent: Written consent was obtained and risks were reviewed including but not limited to scarring, infection, bleeding, scabbing, incomplete removal, nerve damage and allergy to anesthesia. Lab Facility: 3 Notification Instructions: Patient will be notified of biopsy results. However, patient instructed to call the office if not contacted within 2 weeks. Was A Bandage Applied: Yes Type Of Destruction Used: Curettage Biopsy Method: double edge Personna blade Electrodesiccation Text: The wound bed was treated with electrodesiccation after the biopsy was performed. Electrodesiccation And Curettage Text: The wound bed was treated with electrodesiccation and curettage after the biopsy was performed. Hemostasis: Electrocautery Dressing: Band-Aid

## 2020-01-20 RX ORDER — AMLODIPINE BESYLATE 5 MG/1
5 TABLET ORAL DAILY
Qty: 90 TAB | Refills: 2 | Status: SHIPPED | OUTPATIENT
Start: 2020-01-20 | End: 2022-01-07 | Stop reason: SDUPTHER

## 2020-01-20 RX ORDER — LOSARTAN POTASSIUM 100 MG/1
100 TABLET ORAL
Qty: 90 TAB | Refills: 3 | Status: SHIPPED | OUTPATIENT
Start: 2020-01-20 | End: 2020-10-22

## 2020-02-07 ENCOUNTER — HOSPITAL ENCOUNTER (OUTPATIENT)
Dept: LAB | Facility: MEDICAL CENTER | Age: 60
End: 2020-02-07
Attending: FAMILY MEDICINE
Payer: COMMERCIAL

## 2020-02-07 DIAGNOSIS — E55.9 VITAMIN D DEFICIENCY: ICD-10-CM

## 2020-02-07 DIAGNOSIS — E78.00 HYPERCHOLESTEROLEMIA: ICD-10-CM

## 2020-02-07 LAB
25(OH)D3 SERPL-MCNC: 37 NG/ML (ref 30–100)
ALBUMIN SERPL BCP-MCNC: 4.6 G/DL (ref 3.2–4.9)
ALBUMIN/GLOB SERPL: 1.5 G/DL
ALP SERPL-CCNC: 77 U/L (ref 30–99)
ALT SERPL-CCNC: 37 U/L (ref 2–50)
ANION GAP SERPL CALC-SCNC: 5 MMOL/L (ref 0–11.9)
AST SERPL-CCNC: 22 U/L (ref 12–45)
BILIRUB SERPL-MCNC: 0.8 MG/DL (ref 0.1–1.5)
BUN SERPL-MCNC: 17 MG/DL (ref 8–22)
CALCIUM SERPL-MCNC: 9.6 MG/DL (ref 8.5–10.5)
CHLORIDE SERPL-SCNC: 104 MMOL/L (ref 96–112)
CHOLEST SERPL-MCNC: 184 MG/DL (ref 100–199)
CO2 SERPL-SCNC: 28 MMOL/L (ref 20–33)
CREAT SERPL-MCNC: 0.86 MG/DL (ref 0.5–1.4)
FASTING STATUS PATIENT QL REPORTED: NORMAL
GLOBULIN SER CALC-MCNC: 3.1 G/DL (ref 1.9–3.5)
GLUCOSE SERPL-MCNC: 98 MG/DL (ref 65–99)
HDLC SERPL-MCNC: 41 MG/DL
LDLC SERPL CALC-MCNC: 125 MG/DL
POTASSIUM SERPL-SCNC: 4.1 MMOL/L (ref 3.6–5.5)
PROT SERPL-MCNC: 7.7 G/DL (ref 6–8.2)
SODIUM SERPL-SCNC: 137 MMOL/L (ref 135–145)
TRIGL SERPL-MCNC: 88 MG/DL (ref 0–149)

## 2020-02-07 PROCEDURE — 36415 COLL VENOUS BLD VENIPUNCTURE: CPT

## 2020-02-07 PROCEDURE — 80061 LIPID PANEL: CPT

## 2020-02-07 PROCEDURE — 80053 COMPREHEN METABOLIC PANEL: CPT

## 2020-02-07 PROCEDURE — 82306 VITAMIN D 25 HYDROXY: CPT

## 2020-02-12 ENCOUNTER — OFFICE VISIT (OUTPATIENT)
Dept: MEDICAL GROUP | Facility: MEDICAL CENTER | Age: 60
End: 2020-02-12
Payer: COMMERCIAL

## 2020-02-12 VITALS
WEIGHT: 253.31 LBS | HEART RATE: 78 BPM | HEIGHT: 70 IN | SYSTOLIC BLOOD PRESSURE: 122 MMHG | BODY MASS INDEX: 36.26 KG/M2 | DIASTOLIC BLOOD PRESSURE: 78 MMHG | TEMPERATURE: 98.4 F | OXYGEN SATURATION: 95 % | RESPIRATION RATE: 16 BRPM

## 2020-02-12 DIAGNOSIS — E78.00 HYPERCHOLESTEROLEMIA: ICD-10-CM

## 2020-02-12 DIAGNOSIS — E55.9 VITAMIN D DEFICIENCY: ICD-10-CM

## 2020-02-12 DIAGNOSIS — E66.9 OBESITY (BMI 35.0-39.9 WITHOUT COMORBIDITY): ICD-10-CM

## 2020-02-12 DIAGNOSIS — I10 ESSENTIAL HYPERTENSION: ICD-10-CM

## 2020-02-12 PROCEDURE — 99214 OFFICE O/P EST MOD 30 MIN: CPT | Performed by: FAMILY MEDICINE

## 2020-02-12 RX ORDER — ROSUVASTATIN CALCIUM 5 MG/1
5 TABLET, COATED ORAL EVERY EVENING
Qty: 90 TAB | Refills: 3 | Status: SHIPPED | OUTPATIENT
Start: 2020-02-12 | End: 2020-11-09 | Stop reason: SDUPTHER

## 2020-02-12 ASSESSMENT — PATIENT HEALTH QUESTIONNAIRE - PHQ9: CLINICAL INTERPRETATION OF PHQ2 SCORE: 0

## 2020-02-12 NOTE — PROGRESS NOTES
CC:Diagnoses of Essential hypertension, Hypercholesterolemia, Obesity (BMI 35.0-39.9 without comorbidity) (HCC), and Vitamin D deficiency were pertinent to this visit.    HISTORY OF PRESENT ILLNESS: Patient is a 59 y.o. male established patient who presents today to talk about his chronic health problems and review the lab work he had done prior to the office visit.  Patient is frustrated because he states that he is trying to eat healthy and it does not seem to make any difference what he does, he does not see improvement in his lipid studies.  We discussed the fact he probably needs medication because he has a genetic preponderance.      Essential hypertension  This is a chronic health problem that is well controlled with current medications and lifestyle measures.  Patient's blood pressure was initially slightly elevated at 140/82 but after 5 minutes of sitting in the room it was down to 122/78.  His meds have him well controlled.    Hypercholesterolemia  This is a chronic health issue for this patient we had a long discussion regarding the fact that his LDL is slightly high all of the time.  His HDL came up a point which is fabulous but he needs to get his LDL less than 100.  His total cholesterol is 184, triglycerides 88, HDL 41 .  He has hypertension which puts him at a higher risk for heart disease.  Were going to start rosuvastatin 5 mg daily and recheck in 3 months.    Obesity (BMI 35.0-39.9 without comorbidity) (HCC)  Patient continues to slowly lose weight.  He is now down to 114.9 kg which is 253 and last time we saw him it was 254.13.  Organ have him just keep working on what he is doing.    Vitamin D deficiency  Patient utilizing vitamin D 5000 international units daily.  He is done a great job getting this under control.  His vitamin D level is up to 37.  We will have him continue at that dose long-term.      Patient Active Problem List    Diagnosis Date Noted   • Hypercholesterolemia  11/06/2019   • Chronic fatigue 09/03/2019   • Vitamin D deficiency 09/03/2019   • Wellness examination 08/02/2019   • Olecranon bursitis of left elbow 06/27/2018   • Essential hypertension 05/11/2018   • Obesity (BMI 35.0-39.9 without comorbidity) ([facility]) 05/11/2018      Allergies:Ace inhibitors    Current Outpatient Medications   Medication Sig Dispense Refill   • Cholecalciferol (VITAMIN D3) 125 MCG (5000 UT) Cap Take 1 Cap by mouth every day. 30 Cap    • rosuvastatin (CRESTOR) 5 MG Tab Take 1 Tab by mouth every evening. 90 Tab 3   • amLODIPine (NORVASC) 5 MG Tab Take 1 Tab by mouth every day. 90 Tab 2   • losartan (COZAAR) 100 MG Tab Take 1 Tab by mouth every day. 90 Tab 3     No current facility-administered medications for this visit.        Social History     Tobacco Use   • Smoking status: Never Smoker   • Smokeless tobacco: Never Used   Substance Use Topics   • Alcohol use: Yes     Alcohol/week: 0.6 oz     Types: 1 Cans of beer per week     Comment: social   • Drug use: No     Social History     Social History Narrative   • Not on file       Family History   Problem Relation Age of Onset   • Colon Cancer Mother    • Hypertension Mother    • Hyperlipidemia Mother    • No Known Problems Father         ROS:     - Constitutional:  Negative for fever, chills, unexpected weight change, and fatigue/generalized weakness.    - HEENT:  Negative for headaches, vision changes, hearing changes, ear pain, ear discharge, rhinorrhea, sinus congestion, sore throat, and neck pain.      - Respiratory: Negative for cough, sputum production, chest congestion, dyspnea, wheezing, and crackles.      - Cardiovascular: Negative for chest pain, palpitations, orthopnea, and bilateral lower extremity edema.     - Gastrointestinal: Negative for heartburn, nausea, vomiting, abdominal pain, hematochezia, melena, diarrhea, constipation, and greasy/foul-smelling stools.     - Genitourinary: Negative for dysuria, polyuria, hematuria, pyuria,  "urinary urgency, and urinary incontinence.     - Musculoskeletal: Negative for myalgias, back pain, and joint pain.     - Skin: Negative for rash, itching, cyanotic skin color change.     - Neurological: Negative for dizziness, tingling, tremors, focal sensory deficit, focal weakness and headaches.     - Endo/Heme/Allergies: Does not bruise/bleed easily.     - Psychiatric/Behavioral: Negative for depression, suicidal/homicidal ideation and memory loss.          - NOTE: All other systems reviewed and are negative, except as in HPI.      Exam:    /78 (BP Location: Left arm, Patient Position: Sitting, BP Cuff Size: Adult)   Pulse 78   Temp 36.9 °C (98.4 °F) (Temporal)   Resp 16   Ht 1.778 m (5' 10\")   Wt 114.9 kg (253 lb 4.9 oz)   SpO2 95%  Body mass index is 36.35 kg/m².    General:  Well nourished, well developed male in NAD  Head is grossly normal.  Neck: Supple without JVD or bruit. Thyroid is not enlarged.  Pulmonary: Clear to ausculation and percussion.  Normal effort. No rales, ronchi, or wheezing.  Cardiovascular: Regular rate and rhythm without murmur. Carotid and radial pulses are intact and equal bilaterally.  Extremities: no clubbing, cyanosis, or edema.  LABS: 2/7/2020: Results reviewed and discussed with the patient, questions answered.    Please note that this dictation was created using voice recognition software. I have made every reasonable attempt to correct obvious errors, but I expect that there are errors of grammar and possibly content that I did not discover before finalizing the note.    Assessment/Plan:  1. Essential hypertension  Controlled, continue with current meds and lifestyle.      2. Hypercholesterolemia  Uncontrolled, I recommended to the patient that we start him on rosuvastatin 5 mg daily and recheck things in 3 months.  He is willing to do this.  I am hopeful will get his LDL less than 100 with that dose of medication.  He is warned about side effects and will notify me " if he has any problems.  - Comp Metabolic Panel; Future  - Lipid Profile; Future    3. Obesity (BMI 35.0-39.9 without comorbidity) (HCC)  Showing improvement and patient continuing to work on weight loss    4. Vitamin D deficiency  Adequately controlled with vitamin D3 5000 units daily.  He will continue long-term.

## 2020-07-10 ENCOUNTER — HOSPITAL ENCOUNTER (OUTPATIENT)
Dept: LAB | Facility: MEDICAL CENTER | Age: 60
End: 2020-07-10
Attending: FAMILY MEDICINE
Payer: COMMERCIAL

## 2020-07-10 DIAGNOSIS — E78.00 HYPERCHOLESTEROLEMIA: ICD-10-CM

## 2020-07-10 LAB
ALBUMIN SERPL BCP-MCNC: 4.4 G/DL (ref 3.2–4.9)
ALBUMIN/GLOB SERPL: 1.5 G/DL
ALP SERPL-CCNC: 91 U/L (ref 30–99)
ALT SERPL-CCNC: 39 U/L (ref 2–50)
ANION GAP SERPL CALC-SCNC: 12 MMOL/L (ref 7–16)
AST SERPL-CCNC: 24 U/L (ref 12–45)
BILIRUB SERPL-MCNC: 0.5 MG/DL (ref 0.1–1.5)
BUN SERPL-MCNC: 17 MG/DL (ref 8–22)
CALCIUM SERPL-MCNC: 9.6 MG/DL (ref 8.5–10.5)
CHLORIDE SERPL-SCNC: 102 MMOL/L (ref 96–112)
CHOLEST SERPL-MCNC: 130 MG/DL (ref 100–199)
CO2 SERPL-SCNC: 25 MMOL/L (ref 20–33)
CREAT SERPL-MCNC: 0.85 MG/DL (ref 0.5–1.4)
FASTING STATUS PATIENT QL REPORTED: NORMAL
GLOBULIN SER CALC-MCNC: 3 G/DL (ref 1.9–3.5)
GLUCOSE SERPL-MCNC: 93 MG/DL (ref 65–99)
HDLC SERPL-MCNC: 43 MG/DL
LDLC SERPL CALC-MCNC: 69 MG/DL
POTASSIUM SERPL-SCNC: 4.4 MMOL/L (ref 3.6–5.5)
PROT SERPL-MCNC: 7.4 G/DL (ref 6–8.2)
SODIUM SERPL-SCNC: 139 MMOL/L (ref 135–145)
TRIGL SERPL-MCNC: 90 MG/DL (ref 0–149)

## 2020-07-10 PROCEDURE — 80053 COMPREHEN METABOLIC PANEL: CPT

## 2020-07-10 PROCEDURE — 80061 LIPID PANEL: CPT

## 2020-07-10 PROCEDURE — 36415 COLL VENOUS BLD VENIPUNCTURE: CPT

## 2020-07-16 ENCOUNTER — OFFICE VISIT (OUTPATIENT)
Dept: MEDICAL GROUP | Facility: PHYSICIAN GROUP | Age: 60
End: 2020-07-16
Payer: COMMERCIAL

## 2020-07-16 VITALS
WEIGHT: 260.8 LBS | RESPIRATION RATE: 16 BRPM | HEIGHT: 70 IN | DIASTOLIC BLOOD PRESSURE: 80 MMHG | OXYGEN SATURATION: 96 % | BODY MASS INDEX: 37.34 KG/M2 | TEMPERATURE: 98 F | HEART RATE: 77 BPM | SYSTOLIC BLOOD PRESSURE: 130 MMHG

## 2020-07-16 DIAGNOSIS — E78.00 HYPERCHOLESTEROLEMIA: ICD-10-CM

## 2020-07-16 DIAGNOSIS — I10 ESSENTIAL HYPERTENSION: ICD-10-CM

## 2020-07-16 DIAGNOSIS — E55.9 VITAMIN D DEFICIENCY: ICD-10-CM

## 2020-07-16 DIAGNOSIS — E66.9 OBESITY (BMI 35.0-39.9 WITHOUT COMORBIDITY): ICD-10-CM

## 2020-07-16 PROBLEM — R53.82 CHRONIC FATIGUE: Status: RESOLVED | Noted: 2019-09-03 | Resolved: 2020-07-16

## 2020-07-16 PROCEDURE — 99214 OFFICE O/P EST MOD 30 MIN: CPT | Performed by: FAMILY MEDICINE

## 2020-07-16 ASSESSMENT — FIBROSIS 4 INDEX: FIB4 SCORE: 0.78

## 2020-07-16 NOTE — PROGRESS NOTES
CC:Diagnoses of Essential hypertension, Hypercholesterolemia, Obesity (BMI 35.0-39.9 without comorbidity) ([facility]), and Vitamin D deficiency were pertinent to this visit.    HISTORY OF PRESENT ILLNESS: Patient is a 59 y.o. male established patient who presents today to talk about his chronic health problems and his labs that were done prior to the visit.      Essential hypertension  This is a chronic health problem that is well controlled with current medications and lifestyle measures. His BP is excellent at 130/80.    Hypercholesterolemia  This is a chronic health problem that is well controlled with current medications and lifestyle measures. His total cholesterol is good at 130, triglycerides 90, HDL 43, LDL great at 69. Will continue with crestor 5 mg    Obesity (BMI 35.0-39.9 without comorbidity) ([facility])  His weight is up 7 lbs with the corona crisis.       Patient Active Problem List    Diagnosis Date Noted   • Hypercholesterolemia 11/06/2019   • Vitamin D deficiency 09/03/2019   • Wellness examination 08/02/2019   • Olecranon bursitis of left elbow 06/27/2018   • Essential hypertension 05/11/2018   • Obesity (BMI 35.0-39.9 without comorbidity) ([facility]) 05/11/2018      Allergies:Ace inhibitors    Current Outpatient Medications   Medication Sig Dispense Refill   • Cholecalciferol (VITAMIN D3) 125 MCG (5000 UT) Cap Take 1 Cap by mouth every day. 30 Cap    • rosuvastatin (CRESTOR) 5 MG Tab Take 1 Tab by mouth every evening. 90 Tab 3   • amLODIPine (NORVASC) 5 MG Tab Take 1 Tab by mouth every day. 90 Tab 2   • losartan (COZAAR) 100 MG Tab Take 1 Tab by mouth every day. (Patient not taking: Reported on 7/16/2020) 90 Tab 3     No current facility-administered medications for this visit.        Social History     Tobacco Use   • Smoking status: Never Smoker   • Smokeless tobacco: Never Used   Substance Use Topics   • Alcohol use: Yes     Alcohol/week: 0.6 oz     Types: 1 Cans of beer per week     Comment: social   • Drug  "use: No     Social History     Social History Narrative   • Not on file       Family History   Problem Relation Age of Onset   • Colon Cancer Mother    • Hypertension Mother    • Hyperlipidemia Mother    • No Known Problems Father         ROS:     - Constitutional:  Negative for fever, chills, unexpected weight change, and fatigue/generalized weakness.    - HEENT:  Negative for headaches, vision changes, hearing changes, ear pain, ear discharge, rhinorrhea, sinus congestion, sore throat, and neck pain.      - Respiratory: Negative for cough, sputum production, chest congestion, dyspnea, wheezing, and crackles.      - Cardiovascular: Negative for chest pain, palpitations, orthopnea, and bilateral lower extremity edema.     - Gastrointestinal: Negative for heartburn, nausea, vomiting, abdominal pain, hematochezia, melena, diarrhea, constipation, and greasy/foul-smelling stools.     - Genitourinary: Negative for dysuria, polyuria, hematuria, pyuria, urinary urgency, and urinary incontinence.     - Musculoskeletal: Negative for myalgias, back pain, and joint pain.     - Skin: Negative for rash, itching, cyanotic skin color change.     - Neurological: Negative for dizziness, tingling, tremors, focal sensory deficit, focal weakness and headaches.     - Endo/Heme/Allergies: Does not bruise/bleed easily.     - Psychiatric/Behavioral: Negative for depression, suicidal/homicidal ideation and memory loss.          - NOTE: All other systems reviewed and are negative, except as in HPI.      Exam:    /80 (BP Location: Left arm, Patient Position: Sitting)   Pulse 77   Temp 36.7 °C (98 °F) (Tympanic)   Resp 16   Ht 1.778 m (5' 10\")   Wt 118.3 kg (260 lb 12.8 oz)   SpO2 96%  Body mass index is 37.42 kg/m².    General:  Well nourished, well developed male in NAD  Head is grossly normal.  Neck: Supple without JVD or bruit. Thyroid is not enlarged.  Pulmonary: Clear to ausculation and percussion.  Normal effort. No rales, " ronchi, or wheezing.  Cardiovascular: Regular rate and rhythm without murmur. Carotid and radial pulses are intact and equal bilaterally.  Extremities: no clubbing, cyanosis, or edema.  LABS: 7/10/2020: Results reviewed and discussed with the patient, questions answered.    Please note that this dictation was created using voice recognition software. I have made every reasonable attempt to correct obvious errors, but I expect that there are errors of grammar and possibly content that I did not discover before finalizing the note.    Assessment/Plan:  1. Essential hypertension  Controlled, continue with current meds and lifestyle.      2. Hypercholesterolemia  Controlled, continue with current meds and lifestyle.    - Comp Metabolic Panel; Future  - Lipid Profile; Future  - CBC WITHOUT DIFFERENTIAL; Future    3. Obesity (BMI 35.0-39.9 without comorbidity) (HCC)  Unfortunately the patient has gained some weight with the Corona crisis.  He will get back on track with his weight loss efforts and exercise and we will plan to see him back in 3 months with labs prior    4. Vitamin D deficiency  We will recheck the patient's vitamin D level with his next set of labs.  - VITAMIN D,25 HYDROXY; Future

## 2020-07-16 NOTE — ASSESSMENT & PLAN NOTE
This is a chronic health problem that is well controlled with current medications and lifestyle measures. His total cholesterol is good at 130, triglycerides 90, HDL 43, LDL great at 69. Will continue with crestor 5 mg

## 2020-07-16 NOTE — ASSESSMENT & PLAN NOTE
This is a chronic health problem that is well controlled with current medications and lifestyle measures. His BP is excellent at 130/80.

## 2020-10-20 ENCOUNTER — HOSPITAL ENCOUNTER (OUTPATIENT)
Dept: LAB | Facility: MEDICAL CENTER | Age: 60
End: 2020-10-20
Attending: FAMILY MEDICINE
Payer: COMMERCIAL

## 2020-10-20 DIAGNOSIS — E78.00 HYPERCHOLESTEROLEMIA: ICD-10-CM

## 2020-10-20 DIAGNOSIS — E55.9 VITAMIN D DEFICIENCY: ICD-10-CM

## 2020-10-20 LAB
25(OH)D3 SERPL-MCNC: 37 NG/ML (ref 30–100)
ALBUMIN SERPL BCP-MCNC: 4.2 G/DL (ref 3.2–4.9)
ALBUMIN/GLOB SERPL: 1.3 G/DL
ALP SERPL-CCNC: 96 U/L (ref 30–99)
ALT SERPL-CCNC: 32 U/L (ref 2–50)
ANION GAP SERPL CALC-SCNC: 11 MMOL/L (ref 7–16)
AST SERPL-CCNC: 23 U/L (ref 12–45)
BILIRUB SERPL-MCNC: 0.4 MG/DL (ref 0.1–1.5)
BUN SERPL-MCNC: 14 MG/DL (ref 8–22)
CALCIUM SERPL-MCNC: 9.4 MG/DL (ref 8.5–10.5)
CHLORIDE SERPL-SCNC: 100 MMOL/L (ref 96–112)
CHOLEST SERPL-MCNC: 135 MG/DL (ref 100–199)
CO2 SERPL-SCNC: 26 MMOL/L (ref 20–33)
CREAT SERPL-MCNC: 0.91 MG/DL (ref 0.5–1.4)
ERYTHROCYTE [DISTWIDTH] IN BLOOD BY AUTOMATED COUNT: 43 FL (ref 35.9–50)
FASTING STATUS PATIENT QL REPORTED: NORMAL
GLOBULIN SER CALC-MCNC: 3.2 G/DL (ref 1.9–3.5)
GLUCOSE SERPL-MCNC: 94 MG/DL (ref 65–99)
HCT VFR BLD AUTO: 46.1 % (ref 42–52)
HDLC SERPL-MCNC: 43 MG/DL
HGB BLD-MCNC: 15 G/DL (ref 14–18)
LDLC SERPL CALC-MCNC: 72 MG/DL
MCH RBC QN AUTO: 29.1 PG (ref 27–33)
MCHC RBC AUTO-ENTMCNC: 32.5 G/DL (ref 33.7–35.3)
MCV RBC AUTO: 89.3 FL (ref 81.4–97.8)
PLATELET # BLD AUTO: 211 K/UL (ref 164–446)
PMV BLD AUTO: 8.9 FL (ref 9–12.9)
POTASSIUM SERPL-SCNC: 4.1 MMOL/L (ref 3.6–5.5)
PROT SERPL-MCNC: 7.4 G/DL (ref 6–8.2)
RBC # BLD AUTO: 5.16 M/UL (ref 4.7–6.1)
SODIUM SERPL-SCNC: 137 MMOL/L (ref 135–145)
TRIGL SERPL-MCNC: 100 MG/DL (ref 0–149)
WBC # BLD AUTO: 5.7 K/UL (ref 4.8–10.8)

## 2020-10-20 PROCEDURE — 82306 VITAMIN D 25 HYDROXY: CPT

## 2020-10-20 PROCEDURE — 36415 COLL VENOUS BLD VENIPUNCTURE: CPT

## 2020-10-20 PROCEDURE — 80053 COMPREHEN METABOLIC PANEL: CPT

## 2020-10-20 PROCEDURE — 85027 COMPLETE CBC AUTOMATED: CPT

## 2020-10-20 PROCEDURE — 80061 LIPID PANEL: CPT

## 2020-10-22 ENCOUNTER — TELEMEDICINE (OUTPATIENT)
Dept: MEDICAL GROUP | Facility: PHYSICIAN GROUP | Age: 60
End: 2020-10-22
Payer: COMMERCIAL

## 2020-10-22 VITALS
SYSTOLIC BLOOD PRESSURE: 120 MMHG | BODY MASS INDEX: 36.22 KG/M2 | WEIGHT: 253 LBS | HEIGHT: 70 IN | DIASTOLIC BLOOD PRESSURE: 81 MMHG | HEART RATE: 73 BPM

## 2020-10-22 DIAGNOSIS — I10 ESSENTIAL HYPERTENSION: ICD-10-CM

## 2020-10-22 DIAGNOSIS — E55.9 VITAMIN D DEFICIENCY: ICD-10-CM

## 2020-10-22 DIAGNOSIS — E78.00 HYPERCHOLESTEROLEMIA: ICD-10-CM

## 2020-10-22 DIAGNOSIS — E66.9 OBESITY (BMI 35.0-39.9 WITHOUT COMORBIDITY): ICD-10-CM

## 2020-10-22 PROCEDURE — 99214 OFFICE O/P EST MOD 30 MIN: CPT | Mod: 95,CR | Performed by: FAMILY MEDICINE

## 2020-10-22 ASSESSMENT — FIBROSIS 4 INDEX: FIB4 SCORE: 1.1

## 2020-10-22 NOTE — ASSESSMENT & PLAN NOTE
This is a chronic health problem that is well controlled with current medications and lifestyle measures.  His level is good at 37 but we would like to see it a little bit higher between 40-80.  He is going to increase his vitamin D supplementation to 4000 units daily and we will recheck in February 2021.

## 2020-10-22 NOTE — ASSESSMENT & PLAN NOTE
Patient's blood pressure is excellent with current amlodipine 5 mg daily.  With his weight loss he has been able to come off of his second blood pressure agent.  We will have him continue long-term.  He will continue to monitor and knows his goal is to be less than 130/80.

## 2020-10-22 NOTE — ASSESSMENT & PLAN NOTE
This patient continues to work on weight loss and has been successful losing another 7 pounds.  We will continue to follow.

## 2020-10-22 NOTE — ASSESSMENT & PLAN NOTE
This is a chronic health problem well-controlled with his rosuvastatin 5 mg daily.  Total cholesterol is down to 135, triglycerides 100, HDL excellent at 43 LDL excellent at 72 and no liver dysfunction.  He will continue with current meds.  We will recheck in February 2021.

## 2020-10-22 NOTE — PROGRESS NOTES
This evaluation was conducted via Zoom using secure and encrypted videoconferencing technology. The patient was in a private location in the state [facility].    The patient's identity was confirmed and verbal consent was obtained for this virtual visit.      CC:Diagnoses of Vitamin D deficiency, Obesity (BMI 35.0-39.9 without comorbidity) (HCC), Hypercholesterolemia, and Essential hypertension were pertinent to this visit.    HISTORY OF PRESENT ILLNESS: Patient is a 59 y.o. male established patient who presents today to about his chronic health problems and his labs that he did prior to the visit.  Patient really has been working on lifestyle management and trying to be healthier.  He tells me he is playing golf 3-4 times per week.      Vitamin D deficiency  This is a chronic health problem that is well controlled with current medications and lifestyle measures.  His level is good at 37 but we would like to see it a little bit higher between 40-80.  He is going to increase his vitamin D supplementation to 4000 units daily and we will recheck in February 2021.    Obesity (BMI 35.0-39.9 without comorbidity) (HCC)  This patient continues to work on weight loss and has been successful losing another 7 pounds.  We will continue to follow.    Hypercholesterolemia  This is a chronic health problem well-controlled with his rosuvastatin 5 mg daily.  Total cholesterol is down to 135, triglycerides 100, HDL excellent at 43 LDL excellent at 72 and no liver dysfunction.  He will continue with current meds.  We will recheck in February 2021.    Essential hypertension  Patient's blood pressure is excellent with current amlodipine 5 mg daily.  With his weight loss he has been able to come off of his second blood pressure agent.  We will have him continue long-term.  He will continue to monitor and knows his goal is to be less than 130/80.      Patient Active Problem List    Diagnosis Date Noted   • Hypercholesterolemia 11/06/2019    • Vitamin D deficiency 09/03/2019   • Wellness examination 08/02/2019   • Olecranon bursitis of left elbow 06/27/2018   • Essential hypertension 05/11/2018   • Obesity (BMI 35.0-39.9 without comorbidity) ([facility]) 05/11/2018      Allergies:Ace inhibitors    Current Outpatient Medications   Medication Sig Dispense Refill   • Cholecalciferol (VITAMIN D3) 125 MCG (5000 UT) Cap Take 1 Cap by mouth every day. 30 Cap    • rosuvastatin (CRESTOR) 5 MG Tab Take 1 Tab by mouth every evening. 90 Tab 3   • amLODIPine (NORVASC) 5 MG Tab Take 1 Tab by mouth every day. 90 Tab 2     No current facility-administered medications for this visit.        Social History     Tobacco Use   • Smoking status: Never Smoker   • Smokeless tobacco: Never Used   Substance Use Topics   • Alcohol use: Yes     Alcohol/week: 0.6 oz     Types: 1 Cans of beer per week     Comment: social   • Drug use: No     Social History     Social History Narrative   • Not on file       Family History   Problem Relation Age of Onset   • Colon Cancer Mother    • Hypertension Mother    • Hyperlipidemia Mother    • No Known Problems Father         ROS:     - Constitutional:  Negative for fever, chills, unexpected weight change, and fatigue/generalized weakness.    - HEENT:  Negative for headaches, vision changes, hearing changes, ear pain, ear discharge, rhinorrhea, sinus congestion, sore throat, and neck pain.      - Respiratory: Negative for cough, sputum production, chest congestion, dyspnea, wheezing, and crackles.      - Cardiovascular: Negative for chest pain, palpitations, orthopnea, and bilateral lower extremity edema.     - Gastrointestinal: Negative for heartburn, nausea, vomiting, abdominal pain, hematochezia, melena, diarrhea, constipation, and greasy/foul-smelling stools.     - Genitourinary: Negative for dysuria, polyuria, hematuria, pyuria, urinary urgency, and urinary incontinence.     - Musculoskeletal: Negative for myalgias, back pain, and joint pain.  "    - Skin: Negative for rash, itching, cyanotic skin color change.     - Neurological: Negative for dizziness, tingling, tremors, focal sensory deficit, focal weakness and headaches.     - Endo/Heme/Allergies: Does not bruise/bleed easily.     - Psychiatric/Behavioral: Negative for depression, suicidal/homicidal ideation and memory loss.          - NOTE: All other systems reviewed and are negative, except as in HPI.      Exam:    /81   Pulse 73   Ht 1.778 m (5' 10\")   Wt 114.8 kg (253 lb)  Body mass index is 36.3 kg/m².    General:  Well nourished, well developed male in NAD  Head is grossly normal.  LABS: 10/20/2020: Results reviewed and discussed with the patient, questions answered.    Patient was seen for 25 minutes face to face of which, 20 minutes was spent counseling regarding medications interactions and side effects as well as the discussion of his health problems results on his labs and lifestyle changes..    Please note that this dictation was created using voice recognition software. I have made every reasonable attempt to correct obvious errors, but I expect that there are errors of grammar and possibly content that I did not discover before finalizing the note.    Assessment/Plan:  1. Vitamin D deficiency  Adequately controlled but patient is going to go up to 4000 units of vitamin D daily.  Would like to have his level between 40-80.  - VITAMIN D,25 HYDROXY; Future    2. Obesity (BMI 35.0-39.9 without comorbidity) ([facility])  Patient continues to work on weight loss and is down 7 pounds.  He is hoping to achieve 10 pounds over the coming 4 months.    3. Hypercholesterolemia  Controlled, continue with current meds and lifestyle.    - Comp Metabolic Panel; Future  - Lipid Profile; Future    4. Essential hypertension  Controlled, continue with current meds and lifestyle.           "

## 2020-11-09 RX ORDER — ROSUVASTATIN CALCIUM 5 MG/1
5 TABLET, COATED ORAL EVERY EVENING
Qty: 90 TAB | Refills: 3 | Status: SHIPPED | OUTPATIENT
Start: 2020-11-09 | End: 2021-11-11

## 2020-11-09 NOTE — TELEPHONE ENCOUNTER
----- Message from Arias Nath sent at 11/5/2020  9:54 PM PST -----  Regarding: Medication Renewal Request  Contact: [phone]  Refills have been requested for the following medications:        rosuvastatin (CRESTOR) 5 MG Tab [Leesa Brewer M.D.]    Preferred pharmacy: [id number] - [place], [place] - [address] [facility] [place]  Delivery method: Pickup

## 2021-03-15 DIAGNOSIS — Z23 NEED FOR VACCINATION: ICD-10-CM

## 2021-03-23 ENCOUNTER — HOSPITAL ENCOUNTER (OUTPATIENT)
Dept: LAB | Facility: MEDICAL CENTER | Age: 61
End: 2021-03-23
Attending: FAMILY MEDICINE
Payer: COMMERCIAL

## 2021-03-23 DIAGNOSIS — E78.00 HYPERCHOLESTEROLEMIA: ICD-10-CM

## 2021-03-23 DIAGNOSIS — E55.9 VITAMIN D DEFICIENCY: ICD-10-CM

## 2021-03-23 LAB
ALBUMIN SERPL BCP-MCNC: 4.2 G/DL (ref 3.2–4.9)
ALBUMIN/GLOB SERPL: 1.2 G/DL
ALP SERPL-CCNC: 102 U/L (ref 30–99)
ALT SERPL-CCNC: 39 U/L (ref 2–50)
ANION GAP SERPL CALC-SCNC: 10 MMOL/L (ref 7–16)
AST SERPL-CCNC: 23 U/L (ref 12–45)
BILIRUB SERPL-MCNC: 0.5 MG/DL (ref 0.1–1.5)
BUN SERPL-MCNC: 18 MG/DL (ref 8–22)
CALCIUM SERPL-MCNC: 9.5 MG/DL (ref 8.5–10.5)
CHLORIDE SERPL-SCNC: 100 MMOL/L (ref 96–112)
CHOLEST SERPL-MCNC: 123 MG/DL (ref 100–199)
CO2 SERPL-SCNC: 27 MMOL/L (ref 20–33)
CREAT SERPL-MCNC: 0.87 MG/DL (ref 0.5–1.4)
FASTING STATUS PATIENT QL REPORTED: NORMAL
GLOBULIN SER CALC-MCNC: 3.4 G/DL (ref 1.9–3.5)
GLUCOSE SERPL-MCNC: 100 MG/DL (ref 65–99)
HDLC SERPL-MCNC: 39 MG/DL
LDLC SERPL CALC-MCNC: 69 MG/DL
POTASSIUM SERPL-SCNC: 4.1 MMOL/L (ref 3.6–5.5)
PROT SERPL-MCNC: 7.6 G/DL (ref 6–8.2)
SODIUM SERPL-SCNC: 137 MMOL/L (ref 135–145)
TRIGL SERPL-MCNC: 74 MG/DL (ref 0–149)

## 2021-03-23 PROCEDURE — 80053 COMPREHEN METABOLIC PANEL: CPT

## 2021-03-23 PROCEDURE — 82306 VITAMIN D 25 HYDROXY: CPT

## 2021-03-23 PROCEDURE — 80061 LIPID PANEL: CPT

## 2021-03-23 PROCEDURE — 36415 COLL VENOUS BLD VENIPUNCTURE: CPT

## 2021-03-24 LAB — 25(OH)D3 SERPL-MCNC: 45 NG/ML (ref 30–100)

## 2021-08-09 ENCOUNTER — TELEPHONE (OUTPATIENT)
Dept: INTERNAL MEDICINE | Facility: IMAGING CENTER | Age: 61
End: 2021-08-09

## 2021-09-01 ENCOUNTER — APPOINTMENT (RX ONLY)
Dept: URBAN - METROPOLITAN AREA CLINIC 4 | Facility: CLINIC | Age: 61
Setting detail: DERMATOLOGY
End: 2021-09-01

## 2021-09-01 DIAGNOSIS — D22 MELANOCYTIC NEVI: ICD-10-CM

## 2021-09-01 DIAGNOSIS — L57.8 OTHER SKIN CHANGES DUE TO CHRONIC EXPOSURE TO NONIONIZING RADIATION: ICD-10-CM

## 2021-09-01 DIAGNOSIS — L81.4 OTHER MELANIN HYPERPIGMENTATION: ICD-10-CM

## 2021-09-01 DIAGNOSIS — L82.1 OTHER SEBORRHEIC KERATOSIS: ICD-10-CM

## 2021-09-01 DIAGNOSIS — D18.0 HEMANGIOMA: ICD-10-CM

## 2021-09-01 PROBLEM — D22.5 MELANOCYTIC NEVI OF TRUNK: Status: ACTIVE | Noted: 2021-09-01

## 2021-09-01 PROBLEM — D18.01 HEMANGIOMA OF SKIN AND SUBCUTANEOUS TISSUE: Status: ACTIVE | Noted: 2021-09-01

## 2021-09-01 PROBLEM — D48.5 NEOPLASM OF UNCERTAIN BEHAVIOR OF SKIN: Status: ACTIVE | Noted: 2021-09-01

## 2021-09-01 PROCEDURE — ? COUNSELING

## 2021-09-01 PROCEDURE — ? BIOPSY BY SHAVE METHOD

## 2021-09-01 PROCEDURE — 99213 OFFICE O/P EST LOW 20 MIN: CPT | Mod: 25

## 2021-09-01 PROCEDURE — 11102 TANGNTL BX SKIN SINGLE LES: CPT

## 2021-09-01 ASSESSMENT — LOCATION ZONE DERM
LOCATION ZONE: TRUNK
LOCATION ZONE: FACE
LOCATION ZONE: ARM
LOCATION ZONE: HAND

## 2021-09-01 ASSESSMENT — LOCATION DETAILED DESCRIPTION DERM
LOCATION DETAILED: RIGHT MID-UPPER BACK
LOCATION DETAILED: LEFT RADIAL DORSAL HAND
LOCATION DETAILED: LEFT SUPERIOR MEDIAL UPPER BACK
LOCATION DETAILED: LEFT INFERIOR CENTRAL MALAR CHEEK
LOCATION DETAILED: LEFT DISTAL DORSAL FOREARM
LOCATION DETAILED: RIGHT PROXIMAL DORSAL FOREARM
LOCATION DETAILED: RIGHT INFERIOR CENTRAL MALAR CHEEK
LOCATION DETAILED: RIGHT SUPERIOR MEDIAL UPPER BACK
LOCATION DETAILED: LEFT MEDIAL SUPERIOR CHEST
LOCATION DETAILED: RIGHT RADIAL DORSAL HAND
LOCATION DETAILED: LEFT SUPERIOR UPPER BACK

## 2021-09-01 ASSESSMENT — LOCATION SIMPLE DESCRIPTION DERM
LOCATION SIMPLE: LEFT FOREARM
LOCATION SIMPLE: RIGHT HAND
LOCATION SIMPLE: LEFT HAND
LOCATION SIMPLE: RIGHT UPPER BACK
LOCATION SIMPLE: RIGHT FOREARM
LOCATION SIMPLE: LEFT CHEEK
LOCATION SIMPLE: LEFT UPPER BACK
LOCATION SIMPLE: RIGHT CHEEK
LOCATION SIMPLE: CHEST

## 2021-09-28 ENCOUNTER — APPOINTMENT (RX ONLY)
Dept: URBAN - METROPOLITAN AREA CLINIC 4 | Facility: CLINIC | Age: 61
Setting detail: DERMATOLOGY
End: 2021-09-28

## 2021-09-28 DIAGNOSIS — D485 NEOPLASM OF UNCERTAIN BEHAVIOR OF SKIN: ICD-10-CM

## 2021-09-28 PROBLEM — D03.39 MELANOMA IN SITU OF OTHER PARTS OF FACE: Status: ACTIVE | Noted: 2021-09-28

## 2021-09-28 PROBLEM — D48.5 NEOPLASM OF UNCERTAIN BEHAVIOR OF SKIN: Status: ACTIVE | Noted: 2021-09-28

## 2021-09-28 PROCEDURE — 99212 OFFICE O/P EST SF 10 MIN: CPT | Mod: 25

## 2021-09-28 PROCEDURE — 13132 CMPLX RPR F/C/C/M/N/AX/G/H/F: CPT

## 2021-09-28 PROCEDURE — 11643 EXC F/E/E/N/L MAL+MRG 2.1-3: CPT

## 2021-09-28 PROCEDURE — ? COUNSELING

## 2021-09-28 PROCEDURE — 13133 CMPLX RPR F/C/C/M/N/AX/G/H/F: CPT

## 2021-09-28 PROCEDURE — ? EXCISION

## 2021-09-28 PROCEDURE — ? OBSERVATION

## 2021-09-28 ASSESSMENT — LOCATION SIMPLE DESCRIPTION DERM: LOCATION SIMPLE: RIGHT 2ND TOE

## 2021-09-28 ASSESSMENT — LOCATION ZONE DERM: LOCATION ZONE: TOE

## 2021-09-28 ASSESSMENT — LOCATION DETAILED DESCRIPTION DERM: LOCATION DETAILED: RIGHT DORSAL 2ND TOE

## 2021-09-28 NOTE — PROCEDURE: MIPS QUALITY
Quality 111:Pneumonia Vaccination Status For Older Adults: Pneumococcal Vaccination Previously Received
Quality 137: Melanoma: Continuity Of Care - Recall System: Patient information entered into a recall system that includes: target date for the next exam specified AND a process to follow up with patients regarding missed or unscheduled appointments
Quality 130: Documentation Of Current Medications In The Medical Record: Current Medications Documented
Detail Level: Detailed
Quality 265: Biopsy Follow-Up: Biopsy results reviewed, communicated, tracked, and documented
Quality 138: Melanoma: Coordination Of Care: A treatment plan was communicated to the physicians providing continuing care within one month of diagnosis outlining: diagnosis, tumor thickness and a plan for surgery or alternate care.
Quality 226: Preventive Care And Screening: Tobacco Use: Screening And Cessation Intervention: Patient screened for tobacco use and is an ex/non-smoker

## 2021-09-28 NOTE — PROCEDURE: EXCISION
Surgeon (Optional): Eladia
Biopsy Photograph Reviewed: Yes
Previous Accession (Optional): [id number]
Breslow Depth: MM in situ...
Size Of Lesion In Cm: 1.8
X Size Of Lesion In Cm (Optional): 0
Size Of Margin In Cm: 0.5
Anesthesia Volume In Cc: 12
Was An Eye Clamp Used?: No
Eye Clamp Note Details: An eye clamp was used during the procedure.
Excision Method: Elliptical
Repair Type: Complex
Suturegard Retention Suture: 2-0 Nylon
Retention Suture Bite Size: 3 mm
Length To Time In Minutes Device Was In Place: 10
Number Of Hemigard Strips Per Side: 1
Intermediate / Complex Repair - Final Wound Length In Cm: 7.7
Width Of Defect Perpendicular To Closure In Cm (Required): 1.6
Distance Of Undermining In Cm (Required): 3
Undermining Type: Entire Wound
Debridement Text: The wound edges were debrided prior to proceeding with the closure to facilitate wound healing.
Helical Rim Text: The closure involved the helical rim.
Vermilion Border Text: The closure involved the vermilion border.
Nostril Rim Text: The closure involved the nostril rim.
Retention Suture Text: Retention sutures were placed to support the closure and prevent dehiscence.
Lab: [id number]
Lab Facility: 
Graft Donor Site Bandage (Optional-Leave Blank If You Don't Want In Note): Steri-strips and a pressure bandage were applied to the donor site.
Epidermal Closure Graft Donor Site (Optional): simple interrupted
Billing Type: Third-Party Bill
Excision Depth: adipose tissue
Scalpel Size: 15 blade
Anesthesia Type: 1% lidocaine with epinephrine
Additional Anesthesia Volume In Cc: 6
Hemostasis: Electrocautery
Estimated Blood Loss (Cc): minimal
Detail Level: Detailed
Deep Sutures: 4-0 Vicryl
Dermal Closure: buried vertical mattress
Epidermal Sutures: 5-0 Caprosyn
Epidermal Closure: running subcuticular
Wound Care: Bacitracin
Dressing: dry sterile dressing
Suturegard Intro: Intraoperative tissue expansion was performed, utilizing the SUTUREGARD device, in order to reduce wound tension.
Suturegard Body: The suture ends were repeatedly re-tightened and re-clamped to achieve the desired tissue expansion.
Hemigard Intro: Due to skin fragility and wound tension, it was decided to use HEMIGARD adhesive retention suture devices to permit a linear closure. The skin was cleaned and dried for a 6cm distance away from the wound. Excessive hair, if present, was removed to allow for adhesion.
Hemigard Postcare Instructions: The HEMIGARD strips are to remain completely dry for at least 5-7 days.
Positioning (Leave Blank If You Do Not Want): The patient was placed in a comfortable position exposing the surgical site.
Pre-Excision Curettage Text (Leave Blank If You Do Not Want): Prior to drawing the surgical margin the visible lesion was removed with electrodesiccation and curettage to clearly define the lesion size.
Complex Repair Preamble Text (Leave Blank If You Do Not Want): Extensive wide undermining was performed.
Intermediate Repair Preamble Text (Leave Blank If You Do Not Want): Undermining was performed with blunt dissection.
Curvilinear Excision Additional Text (Leave Blank If You Do Not Want): The margin was drawn around the clinically apparent lesion.  A curvilinear shape was then drawn on the skin incorporating the lesion and margins.  Incisions were then made along these lines to the appropriate tissue plane and the lesion was extirpated.
Fusiform Excision Additional Text (Leave Blank If You Do Not Want): The margin was drawn around the clinically apparent lesion.  A fusiform shape was then drawn on the skin incorporating the lesion and margins.  Incisions were then made along these lines to the appropriate tissue plane and the lesion was extirpated.
Elliptical Excision Additional Text (Leave Blank If You Do Not Want): The margin was drawn around the clinically apparent lesion.  An elliptical shape was then drawn on the skin incorporating the lesion and margins.  Incisions were then made along these lines to the appropriate tissue plane and the lesion was extirpated.
Saucerization Excision Additional Text (Leave Blank If You Do Not Want): The margin was drawn around the clinically apparent lesion.  Incisions were then made along these lines, in a tangential fashion, to the appropriate tissue plane and the lesion was extirpated.
Slit Excision Additional Text (Leave Blank If You Do Not Want): A linear line was drawn on the skin overlying the lesion. An incision was made slowly until the lesion was visualized.  Once visualized, the lesion was removed with blunt dissection.
Excisional Biopsy Additional Text (Leave Blank If You Do Not Want): The margin was drawn around the clinically apparent lesion. An elliptical shape was then drawn on the skin incorporating the lesion and margins.  Incisions were then made along these lines to the appropriate tissue plane and the lesion was extirpated.
Perilesional Excision Additional Text (Leave Blank If You Do Not Want): The margin was drawn around the clinically apparent lesion. Incisions were then made along these lines to the appropriate tissue plane and the lesion was extirpated.
Repair Performed By Another Provider Text (Leave Blank If You Do Not Want): After the tissue was excised the defect was repaired by another provider.
No Repair - Repaired With Adjacent Surgical Defect Text (Leave Blank If You Do Not Want): After the excision the defect was repaired concurrently with another surgical defect which was in close approximation.
Advancement Flap (Single) Text: The defect edges were debeveled with a #15 scalpel blade.  Given the location of the defect and the proximity to free margins a single advancement flap was deemed most appropriate.  Using a sterile surgical marker, an appropriate advancement flap was drawn incorporating the defect and placing the expected incisions within the relaxed skin tension lines where possible.    The area thus outlined was incised deep to adipose tissue with a #15 scalpel blade.  The skin margins were undermined to an appropriate distance in all directions utilizing iris scissors.
Advancement Flap (Double) Text: The defect edges were debeveled with a #15 scalpel blade.  Given the location of the defect and the proximity to free margins a double advancement flap was deemed most appropriate.  Using a sterile surgical marker, the appropriate advancement flaps were drawn incorporating the defect and placing the expected incisions within the relaxed skin tension lines where possible.    The area thus outlined was incised deep to adipose tissue with a #15 scalpel blade.  The skin margins were undermined to an appropriate distance in all directions utilizing iris scissors.
Burow's Advancement Flap Text: The defect edges were debeveled with a #15 scalpel blade.  Given the location of the defect and the proximity to free margins a Burow's advancement flap was deemed most appropriate.  Using a sterile surgical marker, the appropriate advancement flap was drawn incorporating the defect and placing the expected incisions within the relaxed skin tension lines where possible.    The area thus outlined was incised deep to adipose tissue with a #15 scalpel blade.  The skin margins were undermined to an appropriate distance in all directions utilizing iris scissors.
Chonodrocutaneous Helical Advancement Flap Text: The defect edges were debeveled with a #15 scalpel blade.  Given the location of the defect and the proximity to free margins a chondrocutaneous helical advancement flap was deemed most appropriate.  Using a sterile surgical marker, the appropriate advancement flap was drawn incorporating the defect and placing the expected incisions within the relaxed skin tension lines where possible.    The area thus outlined was incised deep to adipose tissue with a #15 scalpel blade.  The skin margins were undermined to an appropriate distance in all directions utilizing iris scissors.
Crescentic Advancement Flap Text: The defect edges were debeveled with a #15 scalpel blade.  Given the location of the defect and the proximity to free margins a crescentic advancement flap was deemed most appropriate.  Using a sterile surgical marker, the appropriate advancement flap was drawn incorporating the defect and placing the expected incisions within the relaxed skin tension lines where possible.    The area thus outlined was incised deep to adipose tissue with a #15 scalpel blade.  The skin margins were undermined to an appropriate distance in all directions utilizing iris scissors.
A-T Advancement Flap Text: The defect edges were debeveled with a #15 scalpel blade.  Given the location of the defect, shape of the defect and the proximity to free margins an A-T advancement flap was deemed most appropriate.  Using a sterile surgical marker, an appropriate advancement flap was drawn incorporating the defect and placing the expected incisions within the relaxed skin tension lines where possible.    The area thus outlined was incised deep to adipose tissue with a #15 scalpel blade.  The skin margins were undermined to an appropriate distance in all directions utilizing iris scissors.
O-T Advancement Flap Text: The defect edges were debeveled with a #15 scalpel blade.  Given the location of the defect, shape of the defect and the proximity to free margins an O-T advancement flap was deemed most appropriate.  Using a sterile surgical marker, an appropriate advancement flap was drawn incorporating the defect and placing the expected incisions within the relaxed skin tension lines where possible.    The area thus outlined was incised deep to adipose tissue with a #15 scalpel blade.  The skin margins were undermined to an appropriate distance in all directions utilizing iris scissors.
O-L Flap Text: The defect edges were debeveled with a #15 scalpel blade.  Given the location of the defect, shape of the defect and the proximity to free margins an O-L flap was deemed most appropriate.  Using a sterile surgical marker, an appropriate advancement flap was drawn incorporating the defect and placing the expected incisions within the relaxed skin tension lines where possible.    The area thus outlined was incised deep to adipose tissue with a #15 scalpel blade.  The skin margins were undermined to an appropriate distance in all directions utilizing iris scissors.
O-Z Flap Text: The defect edges were debeveled with a #15 scalpel blade.  Given the location of the defect, shape of the defect and the proximity to free margins an O-Z flap was deemed most appropriate.  Using a sterile surgical marker, an appropriate transposition flap was drawn incorporating the defect and placing the expected incisions within the relaxed skin tension lines where possible. The area thus outlined was incised deep to adipose tissue with a #15 scalpel blade.  The skin margins were undermined to an appropriate distance in all directions utilizing iris scissors.
Double O-Z Flap Text: The defect edges were debeveled with a #15 scalpel blade.  Given the location of the defect, shape of the defect and the proximity to free margins a Double O-Z flap was deemed most appropriate.  Using a sterile surgical marker, an appropriate transposition flap was drawn incorporating the defect and placing the expected incisions within the relaxed skin tension lines where possible. The area thus outlined was incised deep to adipose tissue with a #15 scalpel blade.  The skin margins were undermined to an appropriate distance in all directions utilizing iris scissors.
V-Y Flap Text: The defect edges were debeveled with a #15 scalpel blade.  Given the location of the defect, shape of the defect and the proximity to free margins a V-Y flap was deemed most appropriate.  Using a sterile surgical marker, an appropriate advancement flap was drawn incorporating the defect and placing the expected incisions within the relaxed skin tension lines where possible.    The area thus outlined was incised deep to adipose tissue with a #15 scalpel blade.  The skin margins were undermined to an appropriate distance in all directions utilizing iris scissors.
Advancement-Rotation Flap Text: The defect edges were debeveled with a #15 scalpel blade.  Given the location of the defect, shape of the defect and the proximity to free margins an advancement-rotation flap was deemed most appropriate.  Using a sterile surgical marker, an appropriate flap was drawn incorporating the defect and placing the expected incisions within the relaxed skin tension lines where possible. The area thus outlined was incised deep to adipose tissue with a #15 scalpel blade.  The skin margins were undermined to an appropriate distance in all directions utilizing iris scissors.
Mercedes Flap Text: The defect edges were debeveled with a #15 scalpel blade.  Given the location of the defect, shape of the defect and the proximity to free margins a Mercedes flap was deemed most appropriate.  Using a sterile surgical marker, an appropriate advancement flap was drawn incorporating the defect and placing the expected incisions within the relaxed skin tension lines where possible. The area thus outlined was incised deep to adipose tissue with a #15 scalpel blade.  The skin margins were undermined to an appropriate distance in all directions utilizing iris scissors.
Modified Advancement Flap Text: The defect edges were debeveled with a #15 scalpel blade.  Given the location of the defect, shape of the defect and the proximity to free margins a modified advancement flap was deemed most appropriate.  Using a sterile surgical marker, an appropriate advancement flap was drawn incorporating the defect and placing the expected incisions within the relaxed skin tension lines where possible.    The area thus outlined was incised deep to adipose tissue with a #15 scalpel blade.  The skin margins were undermined to an appropriate distance in all directions utilizing iris scissors.
Mucosal Advancement Flap Text: Given the location of the defect, shape of the defect and the proximity to free margins a mucosal advancement flap was deemed most appropriate. Incisions were made with a 15 blade scalpel in the appropriate fashion along the cutaneous vermilion border and the mucosal lip. The remaining actinically damaged mucosal tissue was excised.  The mucosal advancement flap was then elevated to the gingival sulcus with care taken to preserve the neurovascular structures and advanced into the primary defect. Care was taken to ensure that precise realignment of the vermilion border was achieved.
Peng Advancement Flap Text: The defect edges were debeveled with a #15 scalpel blade.  Given the location of the defect, shape of the defect and the proximity to free margins a Peng advancement flap was deemed most appropriate.  Using a sterile surgical marker, an appropriate advancement flap was drawn incorporating the defect and placing the expected incisions within the relaxed skin tension lines where possible. The area thus outlined was incised deep to adipose tissue with a #15 scalpel blade.  The skin margins were undermined to an appropriate distance in all directions utilizing iris scissors.
Hatchet Flap Text: The defect edges were debeveled with a #15 scalpel blade.  Given the location of the defect, shape of the defect and the proximity to free margins a hatchet flap was deemed most appropriate.  Using a sterile surgical marker, an appropriate hatchet flap was drawn incorporating the defect and placing the expected incisions within the relaxed skin tension lines where possible.    The area thus outlined was incised deep to adipose tissue with a #15 scalpel blade.  The skin margins were undermined to an appropriate distance in all directions utilizing iris scissors.
Rotation Flap Text: The defect edges were debeveled with a #15 scalpel blade.  Given the location of the defect, shape of the defect and the proximity to free margins a rotation flap was deemed most appropriate.  Using a sterile surgical marker, an appropriate rotation flap was drawn incorporating the defect and placing the expected incisions within the relaxed skin tension lines where possible.    The area thus outlined was incised deep to adipose tissue with a #15 scalpel blade.  The skin margins were undermined to an appropriate distance in all directions utilizing iris scissors.
Spiral Flap Text: The defect edges were debeveled with a #15 scalpel blade.  Given the location of the defect, shape of the defect and the proximity to free margins a spiral flap was deemed most appropriate.  Using a sterile surgical marker, an appropriate rotation flap was drawn incorporating the defect and placing the expected incisions within the relaxed skin tension lines where possible. The area thus outlined was incised deep to adipose tissue with a #15 scalpel blade.  The skin margins were undermined to an appropriate distance in all directions utilizing iris scissors.
Staged Advancement Flap Text: The defect edges were debeveled with a #15 scalpel blade.  Given the location of the defect, shape of the defect and the proximity to free margins a staged advancement flap was deemed most appropriate.  Using a sterile surgical marker, an appropriate advancement flap was drawn incorporating the defect and placing the expected incisions within the relaxed skin tension lines where possible. The area thus outlined was incised deep to adipose tissue with a #15 scalpel blade.  The skin margins were undermined to an appropriate distance in all directions utilizing iris scissors.
Star Wedge Flap Text: The defect edges were debeveled with a #15 scalpel blade.  Given the location of the defect, shape of the defect and the proximity to free margins a star wedge flap was deemed most appropriate.  Using a sterile surgical marker, an appropriate rotation flap was drawn incorporating the defect and placing the expected incisions within the relaxed skin tension lines where possible. The area thus outlined was incised deep to adipose tissue with a #15 scalpel blade.  The skin margins were undermined to an appropriate distance in all directions utilizing iris scissors.
Transposition Flap Text: The defect edges were debeveled with a #15 scalpel blade.  Given the location of the defect and the proximity to free margins a transposition flap was deemed most appropriate.  Using a sterile surgical marker, an appropriate transposition flap was drawn incorporating the defect.    The area thus outlined was incised deep to adipose tissue with a #15 scalpel blade.  The skin margins were undermined to an appropriate distance in all directions utilizing iris scissors.
Muscle Hinge Flap Text: The defect edges were debeveled with a #15 scalpel blade.  Given the size, depth and location of the defect and the proximity to free margins a muscle hinge flap was deemed most appropriate.  Using a sterile surgical marker, an appropriate hinge flap was drawn incorporating the defect. The area thus outlined was incised with a #15 scalpel blade.  The skin margins were undermined to an appropriate distance in all directions utilizing iris scissors.
Mustarde Flap Text: The defect edges were debeveled with a #15 scalpel blade.  Given the size, depth and location of the defect and the proximity to free margins a Mustarde flap was deemed most appropriate.  Using a sterile surgical marker, an appropriate flap was drawn incorporating the defect. The area thus outlined was incised with a #15 scalpel blade.  The skin margins were undermined to an appropriate distance in all directions utilizing iris scissors.
Nasal Turnover Hinge Flap Text: The defect edges were debeveled with a #15 scalpel blade.  Given the size, depth, location of the defect and the defect being full thickness a nasal turnover hinge flap was deemed most appropriate.  Using a sterile surgical marker, an appropriate hinge flap was drawn incorporating the defect. The area thus outlined was incised with a #15 scalpel blade. The flap was designed to recreate the nasal mucosal lining and the alar rim. The skin margins were undermined to an appropriate distance in all directions utilizing iris scissors.
Nasalis-Muscle-Based Myocutaneous Island Pedicle Flap Text: Using a #15 blade, an incision was made around the donor flap to the level of the nasalis muscle. Wide lateral undermining was then performed in both the subcutaneous plane above the nasalis muscle, and in a submuscular plane just above periosteum. This allowed the formation of a free nasalis muscle axial pedicle (based on the angular artery) which was still attached to the actual cutaneous flap, increasing its mobility and vascular viability. Hemostasis was obtained with pinpoint electrocoagulation. The flap was mobilized into position and the pivotal anchor points positioned and stabilized with buried interrupted sutures. Subcutaneous and dermal tissues were closed in a multilayered fashion with sutures. Tissue redundancies were excised, and the epidermal edges were apposed without significant tension and sutured with sutures.
Orbicularis Oris Muscle Flap Text: The defect edges were debeveled with a #15 scalpel blade.  Given that the defect affected the competency of the oral sphincter an orbicularis oris muscle flap was deemed most appropriate to restore this competency and normal muscle function.  Using a sterile surgical marker, an appropriate flap was drawn incorporating the defect. The area thus outlined was incised with a #15 scalpel blade.
Melolabial Transposition Flap Text: The defect edges were debeveled with a #15 scalpel blade.  Given the location of the defect and the proximity to free margins a melolabial flap was deemed most appropriate.  Using a sterile surgical marker, an appropriate melolabial transposition flap was drawn incorporating the defect.    The area thus outlined was incised deep to adipose tissue with a #15 scalpel blade.  The skin margins were undermined to an appropriate distance in all directions utilizing iris scissors.
Rhombic Flap Text: The defect edges were debeveled with a #15 scalpel blade.  Given the location of the defect and the proximity to free margins a rhombic flap was deemed most appropriate.  Using a sterile surgical marker, an appropriate rhombic flap was drawn incorporating the defect.    The area thus outlined was incised deep to adipose tissue with a #15 scalpel blade.  The skin margins were undermined to an appropriate distance in all directions utilizing iris scissors.
Rhomboid Transposition Flap Text: The defect edges were debeveled with a #15 scalpel blade.  Given the location of the defect and the proximity to free margins a rhomboid transposition flap was deemed most appropriate.  Using a sterile surgical marker, an appropriate rhomboid flap was drawn incorporating the defect.    The area thus outlined was incised deep to adipose tissue with a #15 scalpel blade.  The skin margins were undermined to an appropriate distance in all directions utilizing iris scissors.
Bi-Rhombic Flap Text: The defect edges were debeveled with a #15 scalpel blade.  Given the location of the defect and the proximity to free margins a bi-rhombic flap was deemed most appropriate.  Using a sterile surgical marker, an appropriate rhombic flap was drawn incorporating the defect. The area thus outlined was incised deep to adipose tissue with a #15 scalpel blade.  The skin margins were undermined to an appropriate distance in all directions utilizing iris scissors.
Helical Rim Advancement Flap Text: The defect edges were debeveled with a #15 blade scalpel.  Given the location of the defect and the proximity to free margins (helical rim) a double helical rim advancement flap was deemed most appropriate.  Using a sterile surgical marker, the appropriate advancement flaps were drawn incorporating the defect and placing the expected incisions between the helical rim and antihelix where possible.  The area thus outlined was incised through and through with a #15 scalpel blade.  With a skin hook and iris scissors, the flaps were gently and sharply undermined and freed up.
Bilateral Helical Rim Advancement Flap Text: The defect edges were debeveled with a #15 blade scalpel.  Given the location of the defect and the proximity to free margins (helical rim) a bilateral helical rim advancement flap was deemed most appropriate.  Using a sterile surgical marker, the appropriate advancement flaps were drawn incorporating the defect and placing the expected incisions between the helical rim and antihelix where possible.  The area thus outlined was incised through and through with a #15 scalpel blade.  With a skin hook and iris scissors, the flaps were gently and sharply undermined and freed up.
Ear Star Wedge Flap Text: The defect edges were debeveled with a #15 blade scalpel.  Given the location of the defect and the proximity to free margins (helical rim) an ear star wedge flap was deemed most appropriate.  Using a sterile surgical marker, the appropriate flap was drawn incorporating the defect and placing the expected incisions between the helical rim and antihelix where possible.  The area thus outlined was incised through and through with a #15 scalpel blade.
Banner Transposition Flap Text: The defect edges were debeveled with a #15 scalpel blade.  Given the location of the defect and the proximity to free margins a Banner transposition flap was deemed most appropriate.  Using a sterile surgical marker, an appropriate flap drawn around the defect. The area thus outlined was incised deep to adipose tissue with a #15 scalpel blade.  The skin margins were undermined to an appropriate distance in all directions utilizing iris scissors.
Bilobed Flap Text: The defect edges were debeveled with a #15 scalpel blade.  Given the location of the defect and the proximity to free margins a bilobe flap was deemed most appropriate.  Using a sterile surgical marker, an appropriate bilobe flap drawn around the defect.    The area thus outlined was incised deep to adipose tissue with a #15 scalpel blade.  The skin margins were undermined to an appropriate distance in all directions utilizing iris scissors.
Bilobed Transposition Flap Text: The defect edges were debeveled with a #15 scalpel blade.  Given the location of the defect and the proximity to free margins a bilobed transposition flap was deemed most appropriate.  Using a sterile surgical marker, an appropriate bilobe flap drawn around the defect.    The area thus outlined was incised deep to adipose tissue with a #15 scalpel blade.  The skin margins were undermined to an appropriate distance in all directions utilizing iris scissors.
Trilobed Flap Text: The defect edges were debeveled with a #15 scalpel blade.  Given the location of the defect and the proximity to free margins a trilobed flap was deemed most appropriate.  Using a sterile surgical marker, an appropriate trilobed flap drawn around the defect.    The area thus outlined was incised deep to adipose tissue with a #15 scalpel blade.  The skin margins were undermined to an appropriate distance in all directions utilizing iris scissors.
Dorsal Nasal Flap Text: The defect edges were debeveled with a #15 scalpel blade.  Given the location of the defect and the proximity to free margins a dorsal nasal flap was deemed most appropriate.  Using a sterile surgical marker, an appropriate dorsal nasal flap was drawn around the defect.    The area thus outlined was incised deep to adipose tissue with a #15 scalpel blade.  The skin margins were undermined to an appropriate distance in all directions utilizing iris scissors.
Island Pedicle Flap Text: The defect edges were debeveled with a #15 scalpel blade.  Given the location of the defect, shape of the defect and the proximity to free margins an island pedicle advancement flap was deemed most appropriate.  Using a sterile surgical marker, an appropriate advancement flap was drawn incorporating the defect, outlining the appropriate donor tissue and placing the expected incisions within the relaxed skin tension lines where possible.    The area thus outlined was incised deep to adipose tissue with a #15 scalpel blade.  The skin margins were undermined to an appropriate distance in all directions around the primary defect and laterally outward around the island pedicle utilizing iris scissors.  There was minimal undermining beneath the pedicle flap.
Island Pedicle Flap With Canthal Suspension Text: The defect edges were debeveled with a #15 scalpel blade.  Given the location of the defect, shape of the defect and the proximity to free margins an island pedicle advancement flap was deemed most appropriate.  Using a sterile surgical marker, an appropriate advancement flap was drawn incorporating the defect, outlining the appropriate donor tissue and placing the expected incisions within the relaxed skin tension lines where possible. The area thus outlined was incised deep to adipose tissue with a #15 scalpel blade.  The skin margins were undermined to an appropriate distance in all directions around the primary defect and laterally outward around the island pedicle utilizing iris scissors.  There was minimal undermining beneath the pedicle flap. A suspension suture was placed in the canthal tendon to prevent tension and prevent ectropion.
Alar Island Pedicle Flap Text: The defect edges were debeveled with a #15 scalpel blade.  Given the location of the defect, shape of the defect and the proximity to the alar rim an island pedicle advancement flap was deemed most appropriate.  Using a sterile surgical marker, an appropriate advancement flap was drawn incorporating the defect, outlining the appropriate donor tissue and placing the expected incisions within the nasal ala running parallel to the alar rim. The area thus outlined was incised with a #15 scalpel blade.  The skin margins were undermined minimally to an appropriate distance in all directions around the primary defect and laterally outward around the island pedicle utilizing iris scissors.  There was minimal undermining beneath the pedicle flap.
Double Island Pedicle Flap Text: The defect edges were debeveled with a #15 scalpel blade.  Given the location of the defect, shape of the defect and the proximity to free margins a double island pedicle advancement flap was deemed most appropriate.  Using a sterile surgical marker, an appropriate advancement flap was drawn incorporating the defect, outlining the appropriate donor tissue and placing the expected incisions within the relaxed skin tension lines where possible.    The area thus outlined was incised deep to adipose tissue with a #15 scalpel blade.  The skin margins were undermined to an appropriate distance in all directions around the primary defect and laterally outward around the island pedicle utilizing iris scissors.  There was minimal undermining beneath the pedicle flap.
Island Pedicle Flap-Requiring Vessel Identification Text: The defect edges were debeveled with a #15 scalpel blade.  Given the location of the defect, shape of the defect and the proximity to free margins an island pedicle advancement flap was deemed most appropriate.  Using a sterile surgical marker, an appropriate advancement flap was drawn, based on the axial vessel mentioned above, incorporating the defect, outlining the appropriate donor tissue and placing the expected incisions within the relaxed skin tension lines where possible.    The area thus outlined was incised deep to adipose tissue with a #15 scalpel blade.  The skin margins were undermined to an appropriate distance in all directions around the primary defect and laterally outward around the island pedicle utilizing iris scissors.  There was minimal undermining beneath the pedicle flap.
Keystone Flap Text: The defect edges were debeveled with a #15 scalpel blade.  Given the location of the defect, shape of the defect a keystone flap was deemed most appropriate.  Using a sterile surgical marker, an appropriate keystone flap was drawn incorporating the defect, outlining the appropriate donor tissue and placing the expected incisions within the relaxed skin tension lines where possible. The area thus outlined was incised deep to adipose tissue with a #15 scalpel blade.  The skin margins were undermined to an appropriate distance in all directions around the primary defect and laterally outward around the flap utilizing iris scissors.
O-T Plasty Text: The defect edges were debeveled with a #15 scalpel blade.  Given the location of the defect, shape of the defect and the proximity to free margins an O-T plasty was deemed most appropriate.  Using a sterile surgical marker, an appropriate O-T plasty was drawn incorporating the defect and placing the expected incisions within the relaxed skin tension lines where possible.    The area thus outlined was incised deep to adipose tissue with a #15 scalpel blade.  The skin margins were undermined to an appropriate distance in all directions utilizing iris scissors.
O-Z Plasty Text: The defect edges were debeveled with a #15 scalpel blade.  Given the location of the defect, shape of the defect and the proximity to free margins an O-Z plasty (double transposition flap) was deemed most appropriate.  Using a sterile surgical marker, the appropriate transposition flaps were drawn incorporating the defect and placing the expected incisions within the relaxed skin tension lines where possible.    The area thus outlined was incised deep to adipose tissue with a #15 scalpel blade.  The skin margins were undermined to an appropriate distance in all directions utilizing iris scissors.  Hemostasis was achieved with electrocautery.  The flaps were then transposed into place, one clockwise and the other counterclockwise, and anchored with interrupted buried subcutaneous sutures.
Double O-Z Plasty Text: The defect edges were debeveled with a #15 scalpel blade.  Given the location of the defect, shape of the defect and the proximity to free margins a Double O-Z plasty (double transposition flap) was deemed most appropriate.  Using a sterile surgical marker, the appropriate transposition flaps were drawn incorporating the defect and placing the expected incisions within the relaxed skin tension lines where possible. The area thus outlined was incised deep to adipose tissue with a #15 scalpel blade.  The skin margins were undermined to an appropriate distance in all directions utilizing iris scissors.  Hemostasis was achieved with electrocautery.  The flaps were then transposed into place, one clockwise and the other counterclockwise, and anchored with interrupted buried subcutaneous sutures.
V-Y Plasty Text: The defect edges were debeveled with a #15 scalpel blade.  Given the location of the defect, shape of the defect and the proximity to free margins an V-Y advancement flap was deemed most appropriate.  Using a sterile surgical marker, an appropriate advancement flap was drawn incorporating the defect and placing the expected incisions within the relaxed skin tension lines where possible.    The area thus outlined was incised deep to adipose tissue with a #15 scalpel blade.  The skin margins were undermined to an appropriate distance in all directions utilizing iris scissors.
H Plasty Text: Given the location of the defect, shape of the defect and the proximity to free margins a H-plasty was deemed most appropriate for repair.  Using a sterile surgical marker, the appropriate advancement arms of the H-plasty were drawn incorporating the defect and placing the expected incisions within the relaxed skin tension lines where possible. The area thus outlined was incised deep to adipose tissue with a #15 scalpel blade. The skin margins were undermined to an appropriate distance in all directions utilizing iris scissors.  The opposing advancement arms were then advanced into place in opposite direction and anchored with interrupted buried subcutaneous sutures.
W Plasty Text: The lesion was extirpated to the level of the fat with a #15 scalpel blade.  Given the location of the defect, shape of the defect and the proximity to free margins a W-plasty was deemed most appropriate for repair.  Using a sterile surgical marker, the appropriate transposition arms of the W-plasty were drawn incorporating the defect and placing the expected incisions within the relaxed skin tension lines where possible.    The area thus outlined was incised deep to adipose tissue with a #15 scalpel blade.  The skin margins were undermined to an appropriate distance in all directions utilizing iris scissors.  The opposing transposition arms were then transposed into place in opposite direction and anchored with interrupted buried subcutaneous sutures.
Z Plasty Text: The lesion was extirpated to the level of the fat with a #15 scalpel blade.  Given the location of the defect, shape of the defect and the proximity to free margins a Z-plasty was deemed most appropriate for repair.  Using a sterile surgical marker, the appropriate transposition arms of the Z-plasty were drawn incorporating the defect and placing the expected incisions within the relaxed skin tension lines where possible.    The area thus outlined was incised deep to adipose tissue with a #15 scalpel blade.  The skin margins were undermined to an appropriate distance in all directions utilizing iris scissors.  The opposing transposition arms were then transposed into place in opposite direction and anchored with interrupted buried subcutaneous sutures.
Zygomaticofacial Flap Text: Given the location of the defect, shape of the defect and the proximity to free margins a zygomaticofacial flap was deemed most appropriate for repair.  Using a sterile surgical marker, the appropriate flap was drawn incorporating the defect and placing the expected incisions within the relaxed skin tension lines where possible. The area thus outlined was incised deep to adipose tissue with a #15 scalpel blade with preservation of a vascular pedicle.  The skin margins were undermined to an appropriate distance in all directions utilizing iris scissors.  The flap was then placed into the defect and anchored with interrupted buried subcutaneous sutures.
Cheek Interpolation Flap Text: A decision was made to reconstruct the defect utilizing an interpolation axial flap and a staged reconstruction.  A telfa template was made of the defect.  This telfa template was then used to outline the Cheek Interpolation flap.  The donor area for the pedicle flap was then injected with anesthesia.  The flap was excised through the skin and subcutaneous tissue down to the layer of the underlying musculature.  The interpolation flap was carefully excised within this deep plane to maintain its blood supply.  The edges of the donor site were undermined.   The donor site was closed in a primary fashion.  The pedicle was then rotated into position and sutured.  Once the tube was sutured into place, adequate blood supply was confirmed with blanching and refill.  The pedicle was then wrapped with xeroform gauze and dressed appropriately with a telfa and gauze bandage to ensure continued blood supply and protect the attached pedicle.
Cheek-To-Nose Interpolation Flap Text: A decision was made to reconstruct the defect utilizing an interpolation axial flap and a staged reconstruction.  A telfa template was made of the defect.  This telfa template was then used to outline the Cheek-To-Nose Interpolation flap.  The donor area for the pedicle flap was then injected with anesthesia.  The flap was excised through the skin and subcutaneous tissue down to the layer of the underlying musculature.  The interpolation flap was carefully excised within this deep plane to maintain its blood supply.  The edges of the donor site were undermined.   The donor site was closed in a primary fashion.  The pedicle was then rotated into position and sutured.  Once the tube was sutured into place, adequate blood supply was confirmed with blanching and refill.  The pedicle was then wrapped with xeroform gauze and dressed appropriately with a telfa and gauze bandage to ensure continued blood supply and protect the attached pedicle.
Interpolation Flap Text: A decision was made to reconstruct the defect utilizing an interpolation axial flap and a staged reconstruction.  A telfa template was made of the defect.  This telfa template was then used to outline the interpolation flap.  The donor area for the pedicle flap was then injected with anesthesia.  The flap was excised through the skin and subcutaneous tissue down to the layer of the underlying musculature.  The interpolation flap was carefully excised within this deep plane to maintain its blood supply.  The edges of the donor site were undermined.   The donor site was closed in a primary fashion.  The pedicle was then rotated into position and sutured.  Once the tube was sutured into place, adequate blood supply was confirmed with blanching and refill.  The pedicle was then wrapped with xeroform gauze and dressed appropriately with a telfa and gauze bandage to ensure continued blood supply and protect the attached pedicle.
Melolabial Interpolation Flap Text: A decision was made to reconstruct the defect utilizing an interpolation axial flap and a staged reconstruction.  A telfa template was made of the defect.  This telfa template was then used to outline the melolabial interpolation flap.  The donor area for the pedicle flap was then injected with anesthesia.  The flap was excised through the skin and subcutaneous tissue down to the layer of the underlying musculature.  The pedicle flap was carefully excised within this deep plane to maintain its blood supply.  The edges of the donor site were undermined.   The donor site was closed in a primary fashion.  The pedicle was then rotated into position and sutured.  Once the tube was sutured into place, adequate blood supply was confirmed with blanching and refill.  The pedicle was then wrapped with xeroform gauze and dressed appropriately with a telfa and gauze bandage to ensure continued blood supply and protect the attached pedicle.
Mastoid Interpolation Flap Text: A decision was made to reconstruct the defect utilizing an interpolation axial flap and a staged reconstruction.  A telfa template was made of the defect.  This telfa template was then used to outline the mastoid interpolation flap.  The donor area for the pedicle flap was then injected with anesthesia.  The flap was excised through the skin and subcutaneous tissue down to the layer of the underlying musculature.  The pedicle flap was carefully excised within this deep plane to maintain its blood supply.  The edges of the donor site were undermined.   The donor site was closed in a primary fashion.  The pedicle was then rotated into position and sutured.  Once the tube was sutured into place, adequate blood supply was confirmed with blanching and refill.  The pedicle was then wrapped with xeroform gauze and dressed appropriately with a telfa and gauze bandage to ensure continued blood supply and protect the attached pedicle.
Posterior Auricular Interpolation Flap Text: A decision was made to reconstruct the defect utilizing an interpolation axial flap and a staged reconstruction.  A telfa template was made of the defect.  This telfa template was then used to outline the posterior auricular interpolation flap.  The donor area for the pedicle flap was then injected with anesthesia.  The flap was excised through the skin and subcutaneous tissue down to the layer of the underlying musculature.  The pedicle flap was carefully excised within this deep plane to maintain its blood supply.  The edges of the donor site were undermined.   The donor site was closed in a primary fashion.  The pedicle was then rotated into position and sutured.  Once the tube was sutured into place, adequate blood supply was confirmed with blanching and refill.  The pedicle was then wrapped with xeroform gauze and dressed appropriately with a telfa and gauze bandage to ensure continued blood supply and protect the attached pedicle.
Paramedian Forehead Flap Text: A decision was made to reconstruct the defect utilizing an interpolation axial flap and a staged reconstruction.  A telfa template was made of the defect.  This telfa template was then used to outline the paramedian forehead pedicle flap.  The donor area for the pedicle flap was then injected with anesthesia.  The flap was excised through the skin and subcutaneous tissue down to the layer of the underlying musculature.  The pedicle flap was carefully excised within this deep plane to maintain its blood supply.  The edges of the donor site were undermined.   The donor site was closed in a primary fashion.  The pedicle was then rotated into position and sutured.  Once the tube was sutured into place, adequate blood supply was confirmed with blanching and refill.  The pedicle was then wrapped with xeroform gauze and dressed appropriately with a telfa and gauze bandage to ensure continued blood supply and protect the attached pedicle.
Lip Wedge Excision Repair Text: Given the location of the defect and the proximity to free margins a full thickness wedge repair was deemed most appropriate.  Using a sterile surgical marker, the appropriate repair was drawn incorporating the defect and placing the expected incisions perpendicular to the vermilion border.  The vermilion border was also meticulously outlined to ensure appropriate reapproximation during the repair.  The area thus outlined was incised through and through with a #15 scalpel blade.  The muscularis and dermis were reaproximated with deep sutures following hemostasis. Care was taken to realign the vermilion border before proceeding with the superficial closure.  Once the vermilion was realigned the superfical and mucosal closure was finished.
Ftsg Text: The defect edges were debeveled with a #15 scalpel blade.  Given the location of the defect, shape of the defect and the proximity to free margins a full thickness skin graft was deemed most appropriate.  Using a sterile surgical marker, the primary defect shape was transferred to the donor site. The area thus outlined was incised deep to adipose tissue with a #15 scalpel blade.  The harvested graft was then trimmed of adipose tissue until only dermis and epidermis was left.  The skin margins of the secondary defect were undermined to an appropriate distance in all directions utilizing iris scissors.  The secondary defect was closed with interrupted buried subcutaneous sutures.  The skin edges were then re-apposed with running  sutures.  The skin graft was then placed in the primary defect and oriented appropriately.
Split-Thickness Skin Graft Text: The defect edges were debeveled with a #15 scalpel blade.  Given the location of the defect, shape of the defect and the proximity to free margins a split thickness skin graft was deemed most appropriate.  Using a sterile surgical marker, the primary defect shape was transferred to the donor site. The split thickness graft was then harvested.  The skin graft was then placed in the primary defect and oriented appropriately.
Burow's Graft Text: The defect edges were debeveled with a #15 scalpel blade.  Given the location of the defect, shape of the defect, the proximity to free margins and the presence of a standing cone deformity a Burow's skin graft was deemed most appropriate. The standing cone was removed and this tissue was then trimmed to the shape of the primary defect. The adipose tissue was also removed until only dermis and epidermis were left.  The skin margins of the secondary defect were undermined to an appropriate distance in all directions utilizing iris scissors.  The secondary defect was closed with interrupted buried subcutaneous sutures.  The skin edges were then re-apposed with running  sutures.  The skin graft was then placed in the primary defect and oriented appropriately.
Cartilage Graft Text: The defect edges were debeveled with a #15 scalpel blade.  Given the location of the defect, shape of the defect, the fact the defect involved a full thickness cartilage defect a cartilage graft was deemed most appropriate.  An appropriate donor site was identified, cleansed, and anesthetized. The cartilage graft was then harvested and transferred to the recipient site, oriented appropriately and then sutured into place.  The secondary defect was then repaired using a primary closure.
Composite Graft Text: The defect edges were debeveled with a #15 scalpel blade.  Given the location of the defect, shape of the defect, the proximity to free margins and the fact the defect was full thickness a composite graft was deemed most appropriate.  The defect was outline and then transferred to the donor site.  A full thickness graft was then excised from the donor site. The graft was then placed in the primary defect, oriented appropriately and then sutured into place.  The secondary defect was then repaired using a primary closure.
Epidermal Autograft Text: The defect edges were debeveled with a #15 scalpel blade.  Given the location of the defect, shape of the defect and the proximity to free margins an epidermal autograft was deemed most appropriate.  Using a sterile surgical marker, the primary defect shape was transferred to the donor site. The epidermal graft was then harvested.  The skin graft was then placed in the primary defect and oriented appropriately.
Dermal Autograft Text: The defect edges were debeveled with a #15 scalpel blade.  Given the location of the defect, shape of the defect and the proximity to free margins a dermal autograft was deemed most appropriate.  Using a sterile surgical marker, the primary defect shape was transferred to the donor site. The area thus outlined was incised deep to adipose tissue with a #15 scalpel blade.  The harvested graft was then trimmed of adipose and epidermal tissue until only dermis was left.  The skin graft was then placed in the primary defect and oriented appropriately.
Skin Substitute Text: The defect edges were debeveled with a #15 scalpel blade.  Given the location of the defect, shape of the defect and the proximity to free margins a skin substitute graft was deemed most appropriate.  The graft material was trimmed to fit the size of the defect. The graft was then placed in the primary defect and oriented appropriately.
Tissue Cultured Epidermal Autograft Text: The defect edges were debeveled with a #15 scalpel blade.  Given the location of the defect, shape of the defect and the proximity to free margins a tissue cultured epidermal autograft was deemed most appropriate.  The graft was then trimmed to fit the size of the defect.  The graft was then placed in the primary defect and oriented appropriately.
Xenograft Text: The defect edges were debeveled with a #15 scalpel blade.  Given the location of the defect, shape of the defect and the proximity to free margins a xenograft was deemed most appropriate.  The graft was then trimmed to fit the size of the defect.  The graft was then placed in the primary defect and oriented appropriately.
Purse String (Intermediate) Text: Given the location of the defect and the characteristics of the surrounding skin a purse string intermediate closure was deemed most appropriate.  Undermining was performed circumfirentially around the surgical defect.  A purse string suture was then placed and tightened.
Purse String (Simple) Text: Given the location of the defect and the characteristics of the surrounding skin a purse string simple closure was deemed most appropriate.  Undermining was performed circumferentially around the surgical defect.  A purse string suture was then placed and tightened.
Partial Purse String (Intermediate) Text: Given the location of the defect and the characteristics of the surrounding skin an intermediate purse string closure was deemed most appropriate.  Undermining was performed circumferentially around the surgical defect.  A purse string suture was then placed and tightened. Wound tension of the circular defect prevented complete closure of the wound.
Partial Purse String (Simple) Text: Given the location of the defect and the characteristics of the surrounding skin a simple purse string closure was deemed most appropriate.  Undermining was performed circumferentially around the surgical defect.  A purse string suture was then placed and tightened. Wound tension of the circular defect prevented complete closure of the wound.
Complex Repair And Single Advancement Flap Text: The defect edges were debeveled with a #15 scalpel blade.  The primary defect was closed partially with a complex linear closure.  Given the location of the remaining defect, shape of the defect and the proximity to free margins a single advancement flap was deemed most appropriate for complete closure of the defect.  Using a sterile surgical marker, an appropriate advancement flap was drawn incorporating the defect and placing the expected incisions within the relaxed skin tension lines where possible.    The area thus outlined was incised deep to adipose tissue with a #15 scalpel blade.  The skin margins were undermined to an appropriate distance in all directions utilizing iris scissors.
Complex Repair And Double Advancement Flap Text: The defect edges were debeveled with a #15 scalpel blade.  The primary defect was closed partially with a complex linear closure.  Given the location of the remaining defect, shape of the defect and the proximity to free margins a double advancement flap was deemed most appropriate for complete closure of the defect.  Using a sterile surgical marker, an appropriate advancement flap was drawn incorporating the defect and placing the expected incisions within the relaxed skin tension lines where possible.    The area thus outlined was incised deep to adipose tissue with a #15 scalpel blade.  The skin margins were undermined to an appropriate distance in all directions utilizing iris scissors.
Complex Repair And Modified Advancement Flap Text: The defect edges were debeveled with a #15 scalpel blade.  The primary defect was closed partially with a complex linear closure.  Given the location of the remaining defect, shape of the defect and the proximity to free margins a modified advancement flap was deemed most appropriate for complete closure of the defect.  Using a sterile surgical marker, an appropriate advancement flap was drawn incorporating the defect and placing the expected incisions within the relaxed skin tension lines where possible.    The area thus outlined was incised deep to adipose tissue with a #15 scalpel blade.  The skin margins were undermined to an appropriate distance in all directions utilizing iris scissors.
Complex Repair And A-T Advancement Flap Text: The defect edges were debeveled with a #15 scalpel blade.  The primary defect was closed partially with a complex linear closure.  Given the location of the remaining defect, shape of the defect and the proximity to free margins an A-T advancement flap was deemed most appropriate for complete closure of the defect.  Using a sterile surgical marker, an appropriate advancement flap was drawn incorporating the defect and placing the expected incisions within the relaxed skin tension lines where possible.    The area thus outlined was incised deep to adipose tissue with a #15 scalpel blade.  The skin margins were undermined to an appropriate distance in all directions utilizing iris scissors.
Complex Repair And O-T Advancement Flap Text: The defect edges were debeveled with a #15 scalpel blade.  The primary defect was closed partially with a complex linear closure.  Given the location of the remaining defect, shape of the defect and the proximity to free margins an O-T advancement flap was deemed most appropriate for complete closure of the defect.  Using a sterile surgical marker, an appropriate advancement flap was drawn incorporating the defect and placing the expected incisions within the relaxed skin tension lines where possible.    The area thus outlined was incised deep to adipose tissue with a #15 scalpel blade.  The skin margins were undermined to an appropriate distance in all directions utilizing iris scissors.
Complex Repair And O-L Flap Text: The defect edges were debeveled with a #15 scalpel blade.  The primary defect was closed partially with a complex linear closure.  Given the location of the remaining defect, shape of the defect and the proximity to free margins an O-L flap was deemed most appropriate for complete closure of the defect.  Using a sterile surgical marker, an appropriate flap was drawn incorporating the defect and placing the expected incisions within the relaxed skin tension lines where possible.    The area thus outlined was incised deep to adipose tissue with a #15 scalpel blade.  The skin margins were undermined to an appropriate distance in all directions utilizing iris scissors.
Complex Repair And Bilobe Flap Text: The defect edges were debeveled with a #15 scalpel blade.  The primary defect was closed partially with a complex linear closure.  Given the location of the remaining defect, shape of the defect and the proximity to free margins a bilobe flap was deemed most appropriate for complete closure of the defect.  Using a sterile surgical marker, an appropriate advancement flap was drawn incorporating the defect and placing the expected incisions within the relaxed skin tension lines where possible.    The area thus outlined was incised deep to adipose tissue with a #15 scalpel blade.  The skin margins were undermined to an appropriate distance in all directions utilizing iris scissors.
Complex Repair And Melolabial Flap Text: The defect edges were debeveled with a #15 scalpel blade.  The primary defect was closed partially with a complex linear closure.  Given the location of the remaining defect, shape of the defect and the proximity to free margins a melolabial flap was deemed most appropriate for complete closure of the defect.  Using a sterile surgical marker, an appropriate advancement flap was drawn incorporating the defect and placing the expected incisions within the relaxed skin tension lines where possible.    The area thus outlined was incised deep to adipose tissue with a #15 scalpel blade.  The skin margins were undermined to an appropriate distance in all directions utilizing iris scissors.
Complex Repair And Rotation Flap Text: The defect edges were debeveled with a #15 scalpel blade.  The primary defect was closed partially with a complex linear closure.  Given the location of the remaining defect, shape of the defect and the proximity to free margins a rotation flap was deemed most appropriate for complete closure of the defect.  Using a sterile surgical marker, an appropriate advancement flap was drawn incorporating the defect and placing the expected incisions within the relaxed skin tension lines where possible.    The area thus outlined was incised deep to adipose tissue with a #15 scalpel blade.  The skin margins were undermined to an appropriate distance in all directions utilizing iris scissors.
Complex Repair And Rhombic Flap Text: The defect edges were debeveled with a #15 scalpel blade.  The primary defect was closed partially with a complex linear closure.  Given the location of the remaining defect, shape of the defect and the proximity to free margins a rhombic flap was deemed most appropriate for complete closure of the defect.  Using a sterile surgical marker, an appropriate advancement flap was drawn incorporating the defect and placing the expected incisions within the relaxed skin tension lines where possible.    The area thus outlined was incised deep to adipose tissue with a #15 scalpel blade.  The skin margins were undermined to an appropriate distance in all directions utilizing iris scissors.
Complex Repair And Transposition Flap Text: The defect edges were debeveled with a #15 scalpel blade.  The primary defect was closed partially with a complex linear closure.  Given the location of the remaining defect, shape of the defect and the proximity to free margins a transposition flap was deemed most appropriate for complete closure of the defect.  Using a sterile surgical marker, an appropriate advancement flap was drawn incorporating the defect and placing the expected incisions within the relaxed skin tension lines where possible.    The area thus outlined was incised deep to adipose tissue with a #15 scalpel blade.  The skin margins were undermined to an appropriate distance in all directions utilizing iris scissors.
Complex Repair And V-Y Plasty Text: The defect edges were debeveled with a #15 scalpel blade.  The primary defect was closed partially with a complex linear closure.  Given the location of the remaining defect, shape of the defect and the proximity to free margins a V-Y plasty was deemed most appropriate for complete closure of the defect.  Using a sterile surgical marker, an appropriate advancement flap was drawn incorporating the defect and placing the expected incisions within the relaxed skin tension lines where possible.    The area thus outlined was incised deep to adipose tissue with a #15 scalpel blade.  The skin margins were undermined to an appropriate distance in all directions utilizing iris scissors.
Complex Repair And M Plasty Text: The defect edges were debeveled with a #15 scalpel blade.  The primary defect was closed partially with a complex linear closure.  Given the location of the remaining defect, shape of the defect and the proximity to free margins an M plasty was deemed most appropriate for complete closure of the defect.  Using a sterile surgical marker, an appropriate advancement flap was drawn incorporating the defect and placing the expected incisions within the relaxed skin tension lines where possible.    The area thus outlined was incised deep to adipose tissue with a #15 scalpel blade.  The skin margins were undermined to an appropriate distance in all directions utilizing iris scissors.
Complex Repair And Double M Plasty Text: The defect edges were debeveled with a #15 scalpel blade.  The primary defect was closed partially with a complex linear closure.  Given the location of the remaining defect, shape of the defect and the proximity to free margins a double M plasty was deemed most appropriate for complete closure of the defect.  Using a sterile surgical marker, an appropriate advancement flap was drawn incorporating the defect and placing the expected incisions within the relaxed skin tension lines where possible.    The area thus outlined was incised deep to adipose tissue with a #15 scalpel blade.  The skin margins were undermined to an appropriate distance in all directions utilizing iris scissors.
Complex Repair And W Plasty Text: The defect edges were debeveled with a #15 scalpel blade.  The primary defect was closed partially with a complex linear closure.  Given the location of the remaining defect, shape of the defect and the proximity to free margins a W plasty was deemed most appropriate for complete closure of the defect.  Using a sterile surgical marker, an appropriate advancement flap was drawn incorporating the defect and placing the expected incisions within the relaxed skin tension lines where possible.    The area thus outlined was incised deep to adipose tissue with a #15 scalpel blade.  The skin margins were undermined to an appropriate distance in all directions utilizing iris scissors.
Complex Repair And Z Plasty Text: The defect edges were debeveled with a #15 scalpel blade.  The primary defect was closed partially with a complex linear closure.  Given the location of the remaining defect, shape of the defect and the proximity to free margins a Z plasty was deemed most appropriate for complete closure of the defect.  Using a sterile surgical marker, an appropriate advancement flap was drawn incorporating the defect and placing the expected incisions within the relaxed skin tension lines where possible.    The area thus outlined was incised deep to adipose tissue with a #15 scalpel blade.  The skin margins were undermined to an appropriate distance in all directions utilizing iris scissors.
Complex Repair And Dorsal Nasal Flap Text: The defect edges were debeveled with a #15 scalpel blade.  The primary defect was closed partially with a complex linear closure.  Given the location of the remaining defect, shape of the defect and the proximity to free margins a dorsal nasal flap was deemed most appropriate for complete closure of the defect.  Using a sterile surgical marker, an appropriate flap was drawn incorporating the defect and placing the expected incisions within the relaxed skin tension lines where possible.    The area thus outlined was incised deep to adipose tissue with a #15 scalpel blade.  The skin margins were undermined to an appropriate distance in all directions utilizing iris scissors.
Complex Repair And Ftsg Text: The defect edges were debeveled with a #15 scalpel blade.  The primary defect was closed partially with a complex linear closure.  Given the location of the defect, shape of the defect and the proximity to free margins a full thickness skin graft was deemed most appropriate to repair the remaining defect.  The graft was trimmed to fit the size of the remaining defect.  The graft was then placed in the primary defect, oriented appropriately, and sutured into place.
Complex Repair And Burow's Graft Text: The defect edges were debeveled with a #15 scalpel blade.  The primary defect was closed partially with a complex linear closure.  Given the location of the defect, shape of the defect, the proximity to free margins and the presence of a standing cone deformity a Burow's graft was deemed most appropriate to repair the remaining defect.  The graft was trimmed to fit the size of the remaining defect.  The graft was then placed in the primary defect, oriented appropriately, and sutured into place.
Complex Repair And Split-Thickness Skin Graft Text: The defect edges were debeveled with a #15 scalpel blade.  The primary defect was closed partially with a complex linear closure.  Given the location of the defect, shape of the defect and the proximity to free margins a split thickness skin graft was deemed most appropriate to repair the remaining defect.  The graft was trimmed to fit the size of the remaining defect.  The graft was then placed in the primary defect, oriented appropriately, and sutured into place.
Complex Repair And Epidermal Autograft Text: The defect edges were debeveled with a #15 scalpel blade.  The primary defect was closed partially with a complex linear closure.  Given the location of the defect, shape of the defect and the proximity to free margins an epidermal autograft was deemed most appropriate to repair the remaining defect.  The graft was trimmed to fit the size of the remaining defect.  The graft was then placed in the primary defect, oriented appropriately, and sutured into place.
Complex Repair And Dermal Autograft Text: The defect edges were debeveled with a #15 scalpel blade.  The primary defect was closed partially with a complex linear closure.  Given the location of the defect, shape of the defect and the proximity to free margins an dermal autograft was deemed most appropriate to repair the remaining defect.  The graft was trimmed to fit the size of the remaining defect.  The graft was then placed in the primary defect, oriented appropriately, and sutured into place.
Complex Repair And Tissue Cultured Epidermal Autograft Text: The defect edges were debeveled with a #15 scalpel blade.  The primary defect was closed partially with a complex linear closure.  Given the location of the defect, shape of the defect and the proximity to free margins an tissue cultured epidermal autograft was deemed most appropriate to repair the remaining defect.  The graft was trimmed to fit the size of the remaining defect.  The graft was then placed in the primary defect, oriented appropriately, and sutured into place.
Complex Repair And Xenograft Text: The defect edges were debeveled with a #15 scalpel blade.  The primary defect was closed partially with a complex linear closure.  Given the location of the defect, shape of the defect and the proximity to free margins a xenograft was deemed most appropriate to repair the remaining defect.  The graft was trimmed to fit the size of the remaining defect.  The graft was then placed in the primary defect, oriented appropriately, and sutured into place.
Complex Repair And Skin Substitute Graft Text: The defect edges were debeveled with a #15 scalpel blade.  The primary defect was closed partially with a complex linear closure.  Given the location of the remaining defect, shape of the defect and the proximity to free margins a skin substitute graft was deemed most appropriate to repair the remaining defect.  The graft was trimmed to fit the size of the remaining defect.  The graft was then placed in the primary defect, oriented appropriately, and sutured into place.
Path Notes (To The Dermatopathologist): Please check margins.
Consent was obtained from the patient. The risks and benefits to therapy were discussed in detail. Specifically, the risks of infection, scarring, bleeding, prolonged wound healing, incomplete removal, allergy to anesthesia, nerve injury and recurrence were addressed. Prior to the procedure, the treatment site was clearly identified and confirmed by the patient. All components of Universal Protocol/PAUSE Rule completed.
Post-Care Instructions: I reviewed with the patient in detail post-care instructions:\\n1. Apply bacitracin over the steri-strips.  \\n2. Cut non-stick pad (Telfa) to cover the steri-strips\\n3. Apply tape (hypafix) over the non-stick pad\\n4. Change once per day for 5 days\\n5. Shower with bandage on, change bandage after shower\\n\\nPatient is not to engage in any heavy lifting, exercise, hot tub, or swimming for the next 14 days. Should the patient develop any fevers, chills, bleeding, severe pain patient will contact the office immediately.
Home Suture Removal Text: Patient was provided a home suture removal kit and will remove their sutures at home.  If they have any questions or difficulties they will call the office.
Where Do You Want The Question To Include Opioid Counseling Located?: Case Summary Tab
Information: Selecting Yes will display possible errors in your note based on the variables you have selected. This validation is only offered as a suggestion for you. PLEASE NOTE THAT THE VALIDATION TEXT WILL BE REMOVED WHEN YOU FINALIZE YOUR NOTE. IF YOU WANT TO FAX A PRELIMINARY NOTE YOU WILL NEED TO TOGGLE THIS TO 'NO' IF YOU DO NOT WANT IT IN YOUR FAXED NOTE.

## 2021-10-05 ENCOUNTER — APPOINTMENT (RX ONLY)
Dept: URBAN - METROPOLITAN AREA CLINIC 4 | Facility: CLINIC | Age: 61
Setting detail: DERMATOLOGY
End: 2021-10-05

## 2021-10-05 DIAGNOSIS — Z48.817 ENCOUNTER FOR SURGICAL AFTERCARE FOLLOWING SURGERY ON THE SKIN AND SUBCUTANEOUS TISSUE: ICD-10-CM

## 2021-10-05 PROCEDURE — 99024 POSTOP FOLLOW-UP VISIT: CPT

## 2021-10-05 PROCEDURE — ? POST-OP WOUND CHECK

## 2021-10-05 ASSESSMENT — LOCATION ZONE DERM: LOCATION ZONE: FACE

## 2021-10-05 ASSESSMENT — LOCATION SIMPLE DESCRIPTION DERM: LOCATION SIMPLE: RIGHT CHEEK

## 2021-10-05 ASSESSMENT — LOCATION DETAILED DESCRIPTION DERM: LOCATION DETAILED: RIGHT INFERIOR CENTRAL MALAR CHEEK

## 2021-10-05 NOTE — PROCEDURE: MIPS QUALITY
Quality 226: Preventive Care And Screening: Tobacco Use: Screening And Cessation Intervention: Patient screened for tobacco use and is an ex/non-smoker
Quality 130: Documentation Of Current Medications In The Medical Record: Current Medications Documented
Detail Level: Detailed
Quality 111:Pneumonia Vaccination Status For Older Adults: Pneumococcal Vaccination not Administered or Previously Received, Reason not Otherwise Specified
Quality 431: Preventive Care And Screening: Unhealthy Alcohol Use - Screening: Patient not identified as an unhealthy alcohol user when screened for unhealthy alcohol use using a systematic screening method
Quality 110: Preventive Care And Screening: Influenza Immunization: Influenza immunization was not ordered or administered, reason not given

## 2021-10-05 NOTE — PROCEDURE: POST-OP WOUND CHECK
Detail Level: Detailed
Add 30568 Cpt? (Important Note: In 2017 The Use Of 25775 Is Being Tracked By Cms To Determine Future Global Period Reimbursement For Global Periods): yes
Wound Dressing Override (Optional): discussed that you can continue to apply Aquaphor/Vaseline and use hydrogen peroxide to remove any crust with gentle scrubbing
Wound Evaluated By: Dr. Diaz
Additional Comments: Discuss that ends of running subcuticular stitches will dissolve and fall off in the next 2-3 weeks

## 2021-11-11 RX ORDER — ROSUVASTATIN CALCIUM 5 MG/1
TABLET, COATED ORAL
Qty: 90 TABLET | Refills: 3 | Status: SHIPPED | OUTPATIENT
Start: 2021-11-11 | End: 2022-07-08 | Stop reason: SDUPTHER

## 2021-11-11 NOTE — TELEPHONE ENCOUNTER
Received request via: Pharmacy    Was the patient seen in the last year in this department? Yes    Does the patient have an active prescription (recently filled or refills available) for medication(s) requested? No     Next Appt 1/7/2022

## 2021-12-15 ENCOUNTER — APPOINTMENT (RX ONLY)
Dept: URBAN - METROPOLITAN AREA CLINIC 4 | Facility: CLINIC | Age: 61
Setting detail: DERMATOLOGY
End: 2021-12-15

## 2021-12-15 DIAGNOSIS — L90.5 SCAR CONDITIONS AND FIBROSIS OF SKIN: ICD-10-CM

## 2021-12-15 DIAGNOSIS — D18.0 HEMANGIOMA: ICD-10-CM

## 2021-12-15 DIAGNOSIS — L82.1 OTHER SEBORRHEIC KERATOSIS: ICD-10-CM

## 2021-12-15 DIAGNOSIS — L81.4 OTHER MELANIN HYPERPIGMENTATION: ICD-10-CM

## 2021-12-15 DIAGNOSIS — L57.8 OTHER SKIN CHANGES DUE TO CHRONIC EXPOSURE TO NONIONIZING RADIATION: ICD-10-CM

## 2021-12-15 DIAGNOSIS — Z85.820 PERSONAL HISTORY OF MALIGNANT MELANOMA OF SKIN: ICD-10-CM

## 2021-12-15 DIAGNOSIS — D22 MELANOCYTIC NEVI: ICD-10-CM

## 2021-12-15 PROBLEM — D18.01 HEMANGIOMA OF SKIN AND SUBCUTANEOUS TISSUE: Status: ACTIVE | Noted: 2021-12-15

## 2021-12-15 PROBLEM — D22.5 MELANOCYTIC NEVI OF TRUNK: Status: ACTIVE | Noted: 2021-12-15

## 2021-12-15 PROCEDURE — 99213 OFFICE O/P EST LOW 20 MIN: CPT

## 2021-12-15 PROCEDURE — ? COUNSELING

## 2021-12-15 ASSESSMENT — LOCATION SIMPLE DESCRIPTION DERM
LOCATION SIMPLE: RIGHT UPPER EXTREMITY
LOCATION SIMPLE: LEFT LOWER EXTREMITY
LOCATION SIMPLE: RIGHT CHEEK
LOCATION SIMPLE: RIGHT LOWER EXTREMITY
LOCATION SIMPLE: LEFT HAND
LOCATION SIMPLE: BACK
LOCATION SIMPLE: RIGHT HAND
LOCATION SIMPLE: LEFT UPPER EXTREMITY
LOCATION SIMPLE: LEFT CHEEK

## 2021-12-15 ASSESSMENT — LOCATION ZONE DERM
LOCATION ZONE: ARM
LOCATION ZONE: FACE
LOCATION ZONE: LEG
LOCATION ZONE: HAND
LOCATION ZONE: TRUNK

## 2021-12-15 ASSESSMENT — LOCATION DETAILED DESCRIPTION DERM
LOCATION DETAILED: LEFT UPPER BACK
LOCATION DETAILED: RIGHT UPPER BACK
LOCATION DETAILED: LEFT MID BACK
LOCATION DETAILED: LEFT ANTERIOR THIGH
LOCATION DETAILED: RIGHT ANTERIOR THIGH
LOCATION DETAILED: LEFT DORSAL FOREARM
LOCATION DETAILED: LEFT DORSAL HAND
LOCATION DETAILED: RIGHT CHEEK
LOCATION DETAILED: LEFT CHEEK
LOCATION DETAILED: RIGHT INFERIOR CENTRAL MALAR CHEEK
LOCATION DETAILED: RIGHT DORSAL FOREARM
LOCATION DETAILED: RIGHT DORSAL HAND

## 2021-12-15 NOTE — PROCEDURE: MIPS QUALITY
Quality 226: Preventive Care And Screening: Tobacco Use: Screening And Cessation Intervention: Patient screened for tobacco use and is an ex/non-smoker
Quality 111:Pneumonia Vaccination Status For Older Adults: Pneumococcal Vaccination Previously Received
Quality 138: Melanoma: Coordination Of Care: A treatment plan was communicated to the physicians providing continuing care within one month of diagnosis outlining: diagnosis, tumor thickness and a plan for surgery or alternate care.
Quality 130: Documentation Of Current Medications In The Medical Record: Current Medications Documented
Detail Level: Detailed
Quality 137: Melanoma: Continuity Of Care - Recall System: Patient information entered into a recall system that includes: target date for the next exam specified AND a process to follow up with patients regarding missed or unscheduled appointments

## 2021-12-15 NOTE — HPI: EVALUATION OF SKIN LESION(S)
What Type Of Note Output Would You Prefer (Optional)?: Standard Output
How Severe Are Your Spot(S)?: mild
Have Your Spot(S) Been Treated In The Past?: has not been treated
Hpi Title: Evaluation of Skin Lesions
Location: Right cheek
Year Removed: 9/2021

## 2022-01-07 ENCOUNTER — OFFICE VISIT (OUTPATIENT)
Dept: INTERNAL MEDICINE | Facility: IMAGING CENTER | Age: 62
End: 2022-01-07
Payer: COMMERCIAL

## 2022-01-07 VITALS
HEART RATE: 74 BPM | RESPIRATION RATE: 16 BRPM | HEIGHT: 71 IN | BODY MASS INDEX: 36.68 KG/M2 | SYSTOLIC BLOOD PRESSURE: 136 MMHG | DIASTOLIC BLOOD PRESSURE: 80 MMHG | TEMPERATURE: 97.6 F | WEIGHT: 262 LBS | OXYGEN SATURATION: 96 %

## 2022-01-07 DIAGNOSIS — E78.00 HYPERCHOLESTEROLEMIA: ICD-10-CM

## 2022-01-07 DIAGNOSIS — I10 ESSENTIAL HYPERTENSION: ICD-10-CM

## 2022-01-07 DIAGNOSIS — E55.9 VITAMIN D DEFICIENCY: ICD-10-CM

## 2022-01-07 DIAGNOSIS — Z85.820 HISTORY OF MALIGNANT MELANOMA OF SKIN: ICD-10-CM

## 2022-01-07 DIAGNOSIS — E66.9 OBESITY (BMI 35.0-39.9 WITHOUT COMORBIDITY): ICD-10-CM

## 2022-01-07 PROCEDURE — 99214 OFFICE O/P EST MOD 30 MIN: CPT | Performed by: FAMILY MEDICINE

## 2022-01-07 RX ORDER — AMLODIPINE BESYLATE 5 MG/1
5 TABLET ORAL DAILY
Qty: 90 TABLET | Refills: 3 | Status: SHIPPED | OUTPATIENT
Start: 2022-01-07 | End: 2022-07-08 | Stop reason: SDUPTHER

## 2022-01-07 ASSESSMENT — PATIENT HEALTH QUESTIONNAIRE - PHQ9: CLINICAL INTERPRETATION OF PHQ2 SCORE: 0

## 2022-01-07 ASSESSMENT — FIBROSIS 4 INDEX: FIB4 SCORE: 1.03

## 2022-01-07 NOTE — PROGRESS NOTES
This patient is reestablishing care after having been seen by me within the last year.      CC: Diagnoses of Essential hypertension, Hypercholesterolemia, Vitamin D deficiency, History of malignant melanoma of skin, and Obesity (BMI 35.0-39.9 without comorbidity) (HCC) were pertinent to this visit.                                                                                                                                         HPI:   Arias presents today with the following concerns:    1. Essential hypertension  Chronic health problem that the patient admits he is not exercising and has gained weight therefore he has seen his blood pressure fluctuate somewhat.  Initially through the door he was elevated but after recheck he was in the normal range.    2. Hypercholesterolemia  Chronic health problem for which the patient has been on rosuvastatin 5 mg daily.  He has not been watching his diet therefore is wishing to work on that for the coming 3 months along with his medication    3. Vitamin D deficiency  Chronic health problems for which he is on vitamin D 5000 units daily.  We need to make certain this is adequate.    4. History of malignant melanoma of skin  Chronic health problem for this patient who had a malignant melanoma removed from the right cheek.  His scar is approximately 3-1/2 inches long.  He has already had his follow-up checks and no sign of recurrence.  He will continue to follow with dermatology.    5. Obesity (BMI 35.0-39.9 without comorbidity) (HCC)  Chronic health problem that the patient has not been paying any attention to.  He will continue to work on weight loss and get himself back to the gym.       Patient Active Problem List    Diagnosis Date Noted   • History of malignant melanoma of skin 01/07/2022   • Hypercholesterolemia 11/06/2019   • Vitamin D deficiency 09/03/2019   • Wellness examination 08/02/2019   • Olecranon bursitis of left elbow 06/27/2018   • Essential hypertension  "05/11/2018   • Obesity (BMI 35.0-39.9 without comorbidity) (HCC) 05/11/2018       Current Outpatient Medications   Medication Sig Dispense Refill   • rosuvastatin (CRESTOR) 5 MG Tab TAKE ONE TABLET BY MOUTH EVERY EVENING 90 Tablet 3   • Cholecalciferol (VITAMIN D3) 125 MCG (5000 UT) Cap Take 1 Cap by mouth every day. 30 Cap    • amLODIPine (NORVASC) 5 MG Tab Take 1 Tab by mouth every day. 90 Tab 2     No current facility-administered medications for this visit.         Allergies as of 01/07/2022 - Reviewed 01/07/2022   Allergen Reaction Noted   • Ace inhibitors Cough 08/02/2019            /80   Pulse 74   Temp 36.4 °C (97.6 °F) (Temporal)   Resp 16   Ht 1.803 m (5' 11\")   Wt 119 kg (262 lb)   SpO2 96%   BMI 36.54 kg/m²     Physical Exam:  Gen:         Alert and oriented, No apparent distress.  Neck:        No Lymphadenopathy or Bruits.  Lungs:     Clear to auscultation bilaterally  CV:          Regular rate and rhythm. No murmurs, rubs or gallops.               Ext:          No clubbing, cyanosis, edema.        Assessment and Plan.   61 y.o. male with the following issues:    Essential hypertension  This is a chronic health problem that is well controlled with current meds.  His weight is up and he has not been exercising.  Ready to get back on track.    Hypercholesterolemia  This is a chronic health problem for which he is on rosuvastatin 5 mg daily.  We will recheck labs in 3 months.    Vitamin D deficiency  This is a chronic health problem that has been well controlled in the past.  We will recheck with next labs.    History of malignant melanoma of skin  Pt is continuing with his follow ups with Dermatology.    Obesity (BMI 35.0-39.9 without comorbidity) (HCC)  Chronic health problem that this patient admits he has not been taking care of his obesity.  He has been to the gym in 2 months and he has not been doing regular exercise or watching his diet.  He is going to get back on track over the coming " 3 months.

## 2022-01-07 NOTE — ASSESSMENT & PLAN NOTE
This is a chronic health problem that has been well controlled in the past.  We will recheck with next labs.

## 2022-01-07 NOTE — ASSESSMENT & PLAN NOTE
Chronic health problem that this patient admits he has not been taking care of his obesity.  He has been to the gym in 2 months and he has not been doing regular exercise or watching his diet.  He is going to get back on track over the coming 3 months.

## 2022-01-07 NOTE — ASSESSMENT & PLAN NOTE
This is a chronic health problem for which he is on rosuvastatin 5 mg daily.  We will recheck labs in 3 months.

## 2022-01-07 NOTE — ASSESSMENT & PLAN NOTE
This is a chronic health problem that is well controlled with current meds.  His weight is up and he has not been exercising.  Ready to get back on track.

## 2022-03-29 ENCOUNTER — APPOINTMENT (RX ONLY)
Dept: URBAN - METROPOLITAN AREA CLINIC 4 | Facility: CLINIC | Age: 62
Setting detail: DERMATOLOGY
End: 2022-03-29

## 2022-03-29 DIAGNOSIS — D22 MELANOCYTIC NEVI: ICD-10-CM

## 2022-03-29 DIAGNOSIS — L81.4 OTHER MELANIN HYPERPIGMENTATION: ICD-10-CM

## 2022-03-29 DIAGNOSIS — L57.8 OTHER SKIN CHANGES DUE TO CHRONIC EXPOSURE TO NONIONIZING RADIATION: ICD-10-CM

## 2022-03-29 DIAGNOSIS — L82.1 OTHER SEBORRHEIC KERATOSIS: ICD-10-CM

## 2022-03-29 DIAGNOSIS — D18.0 HEMANGIOMA: ICD-10-CM

## 2022-03-29 DIAGNOSIS — Z85.820 PERSONAL HISTORY OF MALIGNANT MELANOMA OF SKIN: ICD-10-CM

## 2022-03-29 PROBLEM — D22.5 MELANOCYTIC NEVI OF TRUNK: Status: ACTIVE | Noted: 2022-03-29

## 2022-03-29 PROBLEM — D18.01 HEMANGIOMA OF SKIN AND SUBCUTANEOUS TISSUE: Status: ACTIVE | Noted: 2022-03-29

## 2022-03-29 PROCEDURE — ? COUNSELING

## 2022-03-29 PROCEDURE — 99213 OFFICE O/P EST LOW 20 MIN: CPT

## 2022-03-29 ASSESSMENT — LOCATION ZONE DERM
LOCATION ZONE: HAND
LOCATION ZONE: ARM
LOCATION ZONE: FACE
LOCATION ZONE: LEG
LOCATION ZONE: TRUNK

## 2022-03-29 ASSESSMENT — LOCATION SIMPLE DESCRIPTION DERM
LOCATION SIMPLE: BACK
LOCATION SIMPLE: RIGHT HAND
LOCATION SIMPLE: RIGHT CHEEK
LOCATION SIMPLE: LEFT UPPER EXTREMITY
LOCATION SIMPLE: LEFT LOWER EXTREMITY
LOCATION SIMPLE: LEFT CHEEK
LOCATION SIMPLE: LEFT HAND
LOCATION SIMPLE: RIGHT UPPER EXTREMITY
LOCATION SIMPLE: RIGHT LOWER EXTREMITY

## 2022-03-29 ASSESSMENT — LOCATION DETAILED DESCRIPTION DERM
LOCATION DETAILED: LEFT CHEEK
LOCATION DETAILED: LEFT ANTERIOR THIGH
LOCATION DETAILED: LEFT UPPER BACK
LOCATION DETAILED: LEFT DORSAL FOREARM
LOCATION DETAILED: RIGHT ANTERIOR THIGH
LOCATION DETAILED: LEFT MID BACK
LOCATION DETAILED: RIGHT UPPER BACK
LOCATION DETAILED: RIGHT CHEEK
LOCATION DETAILED: LEFT DORSAL HAND
LOCATION DETAILED: RIGHT DORSAL HAND
LOCATION DETAILED: RIGHT DORSAL FOREARM
LOCATION DETAILED: RIGHT INFERIOR CENTRAL MALAR CHEEK

## 2022-03-29 NOTE — HPI: EVALUATION OF SKIN LESION(S)
What Type Of Note Output Would You Prefer (Optional)?: Standard Output
Hpi Title: Evaluation of Skin Lesions
Year Removed: 9/2021

## 2022-04-05 ENCOUNTER — HOSPITAL ENCOUNTER (OUTPATIENT)
Dept: LAB | Facility: MEDICAL CENTER | Age: 62
End: 2022-04-05
Attending: FAMILY MEDICINE
Payer: COMMERCIAL

## 2022-04-05 DIAGNOSIS — E55.9 VITAMIN D DEFICIENCY: ICD-10-CM

## 2022-04-05 DIAGNOSIS — E78.00 HYPERCHOLESTEROLEMIA: ICD-10-CM

## 2022-04-05 LAB
25(OH)D3 SERPL-MCNC: 44 NG/ML (ref 30–100)
ALBUMIN SERPL BCP-MCNC: 4.3 G/DL (ref 3.2–4.9)
ALBUMIN/GLOB SERPL: 1.4 G/DL
ALP SERPL-CCNC: 97 U/L (ref 30–99)
ALT SERPL-CCNC: 33 U/L (ref 2–50)
ANION GAP SERPL CALC-SCNC: 11 MMOL/L (ref 7–16)
AST SERPL-CCNC: 23 U/L (ref 12–45)
BILIRUB SERPL-MCNC: 0.6 MG/DL (ref 0.1–1.5)
BUN SERPL-MCNC: 10 MG/DL (ref 8–22)
CALCIUM SERPL-MCNC: 9.2 MG/DL (ref 8.5–10.5)
CHLORIDE SERPL-SCNC: 105 MMOL/L (ref 96–112)
CHOLEST SERPL-MCNC: 124 MG/DL (ref 100–199)
CO2 SERPL-SCNC: 26 MMOL/L (ref 20–33)
CREAT SERPL-MCNC: 0.8 MG/DL (ref 0.5–1.4)
ERYTHROCYTE [DISTWIDTH] IN BLOOD BY AUTOMATED COUNT: 43.8 FL (ref 35.9–50)
GFR SERPLBLD CREATININE-BSD FMLA CKD-EPI: 102 ML/MIN/1.73 M 2
GLOBULIN SER CALC-MCNC: 3 G/DL (ref 1.9–3.5)
GLUCOSE SERPL-MCNC: 98 MG/DL (ref 65–99)
HCT VFR BLD AUTO: 47.9 % (ref 42–52)
HDLC SERPL-MCNC: 41 MG/DL
HGB BLD-MCNC: 15.3 G/DL (ref 14–18)
LDLC SERPL CALC-MCNC: 60 MG/DL
MCH RBC QN AUTO: 28.8 PG (ref 27–33)
MCHC RBC AUTO-ENTMCNC: 31.9 G/DL (ref 33.7–35.3)
MCV RBC AUTO: 90 FL (ref 81.4–97.8)
PLATELET # BLD AUTO: 207 K/UL (ref 164–446)
PMV BLD AUTO: 9 FL (ref 9–12.9)
POTASSIUM SERPL-SCNC: 4.3 MMOL/L (ref 3.6–5.5)
PROT SERPL-MCNC: 7.3 G/DL (ref 6–8.2)
RBC # BLD AUTO: 5.32 M/UL (ref 4.7–6.1)
SODIUM SERPL-SCNC: 142 MMOL/L (ref 135–145)
TRIGL SERPL-MCNC: 114 MG/DL (ref 0–149)
WBC # BLD AUTO: 5.6 K/UL (ref 4.8–10.8)

## 2022-04-05 PROCEDURE — 36415 COLL VENOUS BLD VENIPUNCTURE: CPT

## 2022-04-05 PROCEDURE — 85027 COMPLETE CBC AUTOMATED: CPT

## 2022-04-05 PROCEDURE — 80053 COMPREHEN METABOLIC PANEL: CPT

## 2022-04-05 PROCEDURE — 82306 VITAMIN D 25 HYDROXY: CPT

## 2022-04-05 PROCEDURE — 80061 LIPID PANEL: CPT

## 2022-04-08 ENCOUNTER — OFFICE VISIT (OUTPATIENT)
Dept: INTERNAL MEDICINE | Facility: IMAGING CENTER | Age: 62
End: 2022-04-08
Payer: COMMERCIAL

## 2022-04-08 VITALS
BODY MASS INDEX: 36.01 KG/M2 | HEART RATE: 79 BPM | DIASTOLIC BLOOD PRESSURE: 86 MMHG | RESPIRATION RATE: 12 BRPM | SYSTOLIC BLOOD PRESSURE: 138 MMHG | TEMPERATURE: 97.9 F | HEIGHT: 71 IN | OXYGEN SATURATION: 95 % | WEIGHT: 257.2 LBS

## 2022-04-08 DIAGNOSIS — E55.9 VITAMIN D DEFICIENCY: ICD-10-CM

## 2022-04-08 DIAGNOSIS — E78.00 HYPERCHOLESTEROLEMIA: ICD-10-CM

## 2022-04-08 DIAGNOSIS — I10 ESSENTIAL HYPERTENSION: ICD-10-CM

## 2022-04-08 DIAGNOSIS — E66.9 OBESITY (BMI 35.0-39.9 WITHOUT COMORBIDITY): ICD-10-CM

## 2022-04-08 DIAGNOSIS — Z85.820 HISTORY OF MALIGNANT MELANOMA OF SKIN: ICD-10-CM

## 2022-04-08 PROCEDURE — 99214 OFFICE O/P EST MOD 30 MIN: CPT | Performed by: FAMILY MEDICINE

## 2022-04-08 RX ORDER — LOSARTAN POTASSIUM 100 MG/1
TABLET ORAL
COMMUNITY
Start: 2022-01-18 | End: 2022-05-11 | Stop reason: SDUPTHER

## 2022-04-08 RX ORDER — ROSUVASTATIN CALCIUM 5 MG/1
1 TABLET, COATED ORAL DAILY
COMMUNITY
Start: 2021-11-11 | End: 2022-04-08

## 2022-04-08 ASSESSMENT — FIBROSIS 4 INDEX: FIB4 SCORE: 1.14

## 2022-04-08 NOTE — ASSESSMENT & PLAN NOTE
This is a chronic problem for the patient but he has started working on going to the gym 4 times a week and he also has started yoga to help with his golfing.  He finds that it has made a huge difference for him.  His weight is down 5 pounds from when I last saw him his BMI is in the lower 35 region.  He will continue on this long-term.

## 2022-04-08 NOTE — ASSESSMENT & PLAN NOTE
This is a chronic health problem for this patient that is well controlled with current meds.  He currently is taking amlodipine 5 mg daily and losartan 100 mg daily.  He has had no side effects from the meds.  He states when he checks his blood pressure they are reasonable.  Today 138/86.

## 2022-04-08 NOTE — PROGRESS NOTES
Reopened to check for medication refills, none needed      CC: Diagnoses of Essential hypertension, History of malignant melanoma of skin, Hypercholesterolemia, Obesity (BMI 35.0-39.9 without comorbidity) (HCC), and Vitamin D deficiency were pertinent to this visit.                                                                                                                                         HPI:   Arias presents today with the following concerns:    1. Essential hypertension  Chronic health problem that he continues to deal with and does well.    2. History of malignant melanoma of skin  Chronic health problem that he is continuing his follow-up and surveillance with dermatology    3. Hypercholesterolemia  Chronic health problem well-controlled with current meds and no side effects.    4. Obesity (BMI 35.0-39.9 without comorbidity) (HCC)  Chronic problem that the patient is working on.  He is actually gotten back to the gym is also utilizing yoga which I am hopeful will help him to continue his weight loss.    5. Vitamin D deficiency  Chronic health problem controlled on supplements       Patient Active Problem List    Diagnosis Date Noted   • History of malignant melanoma of skin 01/07/2022   • Hypercholesterolemia 11/06/2019   • Vitamin D deficiency 09/03/2019   • Wellness examination 08/02/2019   • Olecranon bursitis of left elbow 06/27/2018   • Essential hypertension 05/11/2018   • Obesity (BMI 35.0-39.9 without comorbidity) (HCC) 05/11/2018       Current Outpatient Medications   Medication Sig Dispense Refill   • losartan (COZAAR) 100 MG Tab      • amLODIPine (NORVASC) 5 MG Tab Take 1 Tablet by mouth every day. 90 Tablet 3   • rosuvastatin (CRESTOR) 5 MG Tab TAKE ONE TABLET BY MOUTH EVERY EVENING 90 Tablet 3   • Cholecalciferol (VITAMIN D3) 125 MCG (5000 UT) Cap Take 1 Cap by mouth every day. 30 Cap      No current facility-administered medications for this visit.         Allergies as of 04/08/2022 -  "Reviewed 04/08/2022   Allergen Reaction Noted   • Ace inhibitors Cough 08/02/2019            /86 (BP Location: Right arm, Patient Position: Sitting, BP Cuff Size: Adult)   Pulse 79   Temp 36.6 °C (97.9 °F) (Temporal)   Resp 12   Ht 1.803 m (5' 11\")   Wt 117 kg (257 lb 3.2 oz)   SpO2 95%   BMI 35.87 kg/m²         LABS: 4/5/22: Results reviewed and discussed with the patient, questions answered.      Assessment and Plan.   61 y.o. male with the following issues:    Essential hypertension  This is a chronic health problem for this patient that is well controlled with current meds.  He currently is taking amlodipine 5 mg daily and losartan 100 mg daily.  He has had no side effects from the meds.  He states when he checks his blood pressure they are reasonable.  Today 138/86.    History of malignant melanoma of skin  Patient is currently continuing to follow with Dr. Mitchell every quarter.  When he gets to 1 year to go down to every 4 months for a year and then about every 6 months for a year.  He has had no signs of recurrence.    Hypercholesterolemia  This is a chronic health problem well controlled.  Patient continues on rosuvastatin 5 mg daily.  His blood work is excellent total cholesterol 124, triglycerides 114, HDL is actually up to 41 and his LDL is down to 60.  No liver dysfunction.  He will continue with current meds.    Obesity (BMI 35.0-39.9 without comorbidity) (HCC)  This is a chronic problem for the patient but he has started working on going to the gym 4 times a week and he also has started yoga to help with his golfing.  He finds that it has made a huge difference for him.  His weight is down 5 pounds from when I last saw him his BMI is in the lower 35 region.  He will continue on this long-term.    Vitamin D deficiency  Chronic health problem for which patient is on 5000 units of vitamin D daily.  His vitamin D is great at 44.  Would like to keep in between 44-80.  He will continue " long-term.

## 2022-04-08 NOTE — ASSESSMENT & PLAN NOTE
Chronic health problem for which patient is on 5000 units of vitamin D daily.  His vitamin D is great at 44.  Would like to keep in between 44-80.  He will continue long-term.

## 2022-04-08 NOTE — ASSESSMENT & PLAN NOTE
This is a chronic health problem well controlled.  Patient continues on rosuvastatin 5 mg daily.  His blood work is excellent total cholesterol 124, triglycerides 114, HDL is actually up to 41 and his LDL is down to 60.  No liver dysfunction.  He will continue with current meds.

## 2022-04-18 ENCOUNTER — RESEARCH ENCOUNTER (OUTPATIENT)
Dept: MEDICAL GROUP | Facility: PHYSICIAN GROUP | Age: 62
End: 2022-04-18
Attending: FAMILY MEDICINE
Payer: COMMERCIAL

## 2022-04-18 DIAGNOSIS — Z00.6 RESEARCH STUDY PATIENT: ICD-10-CM

## 2022-05-11 NOTE — TELEPHONE ENCOUNTER
Received request via: Pharmacy    Was the patient seen in the last year in this department? Yes    Does the patient have an active prescription (recently filled or refills available) for medication(s) requested? No  
none

## 2022-05-12 RX ORDER — LOSARTAN POTASSIUM 100 MG/1
100 TABLET ORAL DAILY
Qty: 90 TABLET | Refills: 3 | Status: SHIPPED | OUTPATIENT
Start: 2022-05-12 | End: 2022-07-08 | Stop reason: SDUPTHER

## 2022-05-13 NOTE — ASSESSMENT & PLAN NOTE
HR=75 bpm, HCPJ=470/71 mmhg, SpO2=91.0 %, Resp=27 B/min, EtCO2=22 mmHg, Apnea=1 Seconds, Telly=10, Comment=nsr Patient is currently continuing to follow with Dr. Mitchell every quarter.  When he gets to 1 year to go down to every 4 months for a year and then about every 6 months for a year.  He has had no signs of recurrence.

## 2022-05-19 LAB
APOB+LDLR+PCSK9 GENE MUT ANL BLD/T: NOT DETECTED
BRCA1+BRCA2 DEL+DUP + FULL MUT ANL BLD/T: NOT DETECTED
MLH1+MSH2+MSH6+PMS2 GN DEL+DUP+FUL M: NOT DETECTED

## 2022-06-21 NOTE — ASSESSMENT & PLAN NOTE
This is a chronic health problem that is uncontrolled with current medications and lifestyle measures. He is working on weight loss   Home

## 2022-06-29 ENCOUNTER — APPOINTMENT (RX ONLY)
Dept: URBAN - METROPOLITAN AREA CLINIC 4 | Facility: CLINIC | Age: 62
Setting detail: DERMATOLOGY
End: 2022-06-29

## 2022-06-29 DIAGNOSIS — L81.4 OTHER MELANIN HYPERPIGMENTATION: ICD-10-CM

## 2022-06-29 DIAGNOSIS — Z85.820 PERSONAL HISTORY OF MALIGNANT MELANOMA OF SKIN: ICD-10-CM

## 2022-06-29 DIAGNOSIS — L82.1 OTHER SEBORRHEIC KERATOSIS: ICD-10-CM

## 2022-06-29 DIAGNOSIS — D18.0 HEMANGIOMA: ICD-10-CM

## 2022-06-29 DIAGNOSIS — D22 MELANOCYTIC NEVI: ICD-10-CM

## 2022-06-29 DIAGNOSIS — L57.8 OTHER SKIN CHANGES DUE TO CHRONIC EXPOSURE TO NONIONIZING RADIATION: ICD-10-CM

## 2022-06-29 PROBLEM — D18.01 HEMANGIOMA OF SKIN AND SUBCUTANEOUS TISSUE: Status: ACTIVE | Noted: 2022-06-29

## 2022-06-29 PROBLEM — D22.5 MELANOCYTIC NEVI OF TRUNK: Status: ACTIVE | Noted: 2022-06-29

## 2022-06-29 PROCEDURE — ? COUNSELING

## 2022-06-29 PROCEDURE — 99213 OFFICE O/P EST LOW 20 MIN: CPT

## 2022-06-29 ASSESSMENT — LOCATION SIMPLE DESCRIPTION DERM
LOCATION SIMPLE: RIGHT UPPER EXTREMITY
LOCATION SIMPLE: RIGHT HAND
LOCATION SIMPLE: LEFT LOWER EXTREMITY
LOCATION SIMPLE: LEFT CHEEK
LOCATION SIMPLE: LEFT HAND
LOCATION SIMPLE: RIGHT CHEEK
LOCATION SIMPLE: LEFT UPPER EXTREMITY
LOCATION SIMPLE: RIGHT LOWER EXTREMITY
LOCATION SIMPLE: BACK

## 2022-06-29 ASSESSMENT — LOCATION DETAILED DESCRIPTION DERM
LOCATION DETAILED: LEFT ANTERIOR THIGH
LOCATION DETAILED: RIGHT DORSAL HAND
LOCATION DETAILED: RIGHT ANTERIOR THIGH
LOCATION DETAILED: LEFT MID BACK
LOCATION DETAILED: LEFT DORSAL FOREARM
LOCATION DETAILED: LEFT CHEEK
LOCATION DETAILED: RIGHT CHEEK
LOCATION DETAILED: LEFT DORSAL HAND
LOCATION DETAILED: RIGHT UPPER BACK
LOCATION DETAILED: LEFT UPPER BACK
LOCATION DETAILED: RIGHT DORSAL FOREARM

## 2022-06-29 ASSESSMENT — LOCATION ZONE DERM
LOCATION ZONE: TRUNK
LOCATION ZONE: ARM
LOCATION ZONE: HAND
LOCATION ZONE: LEG
LOCATION ZONE: FACE

## 2022-06-29 NOTE — PROCEDURE: MIPS QUALITY
Quality 138: Melanoma: Coordination Of Care: A treatment plan was communicated to the physicians providing continuing care within one month of diagnosis outlining: diagnosis, tumor thickness and a plan for surgery or alternate care.
Quality 130: Documentation Of Current Medications In The Medical Record: Current Medications Documented
Detail Level: Detailed
Quality 137: Melanoma: Continuity Of Care - Recall System: Patient information entered into a recall system that includes: target date for the next exam specified AND a process to follow up with patients regarding missed or unscheduled appointments
Quality 226: Preventive Care And Screening: Tobacco Use: Screening And Cessation Intervention: Patient screened for tobacco use and is an ex/non-smoker

## 2022-06-29 NOTE — HPI: EVALUATION OF SKIN LESION(S)
What Type Of Note Output Would You Prefer (Optional)?: Standard Output
Hpi Title: Evaluation of Skin Lesions
Location: Right cheek
Year Removed: 2021

## 2022-07-05 ENCOUNTER — HOSPITAL ENCOUNTER (OUTPATIENT)
Dept: LAB | Facility: MEDICAL CENTER | Age: 62
End: 2022-07-05
Attending: FAMILY MEDICINE
Payer: COMMERCIAL

## 2022-07-05 DIAGNOSIS — E78.00 HYPERCHOLESTEROLEMIA: ICD-10-CM

## 2022-07-05 LAB
ALBUMIN SERPL BCP-MCNC: 4.4 G/DL (ref 3.2–4.9)
ALBUMIN/GLOB SERPL: 1.5 G/DL
ALP SERPL-CCNC: 91 U/L (ref 30–99)
ALT SERPL-CCNC: 30 U/L (ref 2–50)
ANION GAP SERPL CALC-SCNC: 11 MMOL/L (ref 7–16)
AST SERPL-CCNC: 23 U/L (ref 12–45)
BILIRUB SERPL-MCNC: 0.4 MG/DL (ref 0.1–1.5)
BUN SERPL-MCNC: 19 MG/DL (ref 8–22)
CALCIUM SERPL-MCNC: 9.5 MG/DL (ref 8.5–10.5)
CHLORIDE SERPL-SCNC: 105 MMOL/L (ref 96–112)
CHOLEST SERPL-MCNC: 135 MG/DL (ref 100–199)
CO2 SERPL-SCNC: 24 MMOL/L (ref 20–33)
CREAT SERPL-MCNC: 0.93 MG/DL (ref 0.5–1.4)
FASTING STATUS PATIENT QL REPORTED: NORMAL
GFR SERPLBLD CREATININE-BSD FMLA CKD-EPI: 94 ML/MIN/1.73 M 2
GLOBULIN SER CALC-MCNC: 3 G/DL (ref 1.9–3.5)
GLUCOSE SERPL-MCNC: 107 MG/DL (ref 65–99)
HDLC SERPL-MCNC: 47 MG/DL
LDLC SERPL CALC-MCNC: 74 MG/DL
POTASSIUM SERPL-SCNC: 4.5 MMOL/L (ref 3.6–5.5)
PROT SERPL-MCNC: 7.4 G/DL (ref 6–8.2)
SODIUM SERPL-SCNC: 140 MMOL/L (ref 135–145)
TRIGL SERPL-MCNC: 70 MG/DL (ref 0–149)

## 2022-07-05 PROCEDURE — 36415 COLL VENOUS BLD VENIPUNCTURE: CPT

## 2022-07-05 PROCEDURE — 80053 COMPREHEN METABOLIC PANEL: CPT

## 2022-07-05 PROCEDURE — 80061 LIPID PANEL: CPT

## 2022-07-08 ENCOUNTER — TELEMEDICINE (OUTPATIENT)
Dept: MEDICAL GROUP | Facility: PHYSICIAN GROUP | Age: 62
End: 2022-07-08
Payer: COMMERCIAL

## 2022-07-08 VITALS
BODY MASS INDEX: 35.98 KG/M2 | HEIGHT: 71 IN | DIASTOLIC BLOOD PRESSURE: 83 MMHG | WEIGHT: 257 LBS | TEMPERATURE: 97.9 F | HEART RATE: 79 BPM | SYSTOLIC BLOOD PRESSURE: 134 MMHG

## 2022-07-08 DIAGNOSIS — I10 ESSENTIAL HYPERTENSION: ICD-10-CM

## 2022-07-08 DIAGNOSIS — R73.03 PREDIABETES: ICD-10-CM

## 2022-07-08 DIAGNOSIS — E78.00 HYPERCHOLESTEROLEMIA: ICD-10-CM

## 2022-07-08 PROCEDURE — 99214 OFFICE O/P EST MOD 30 MIN: CPT | Mod: 95 | Performed by: FAMILY MEDICINE

## 2022-07-08 RX ORDER — AMLODIPINE BESYLATE 5 MG/1
5 TABLET ORAL DAILY
Qty: 90 TABLET | Refills: 3 | Status: SHIPPED | OUTPATIENT
Start: 2022-07-08 | End: 2023-07-11 | Stop reason: SDUPTHER

## 2022-07-08 RX ORDER — ROSUVASTATIN CALCIUM 5 MG/1
5 TABLET, COATED ORAL EVERY EVENING
Qty: 90 TABLET | Refills: 3 | Status: SHIPPED | OUTPATIENT
Start: 2022-07-08 | End: 2023-10-30 | Stop reason: SDUPTHER

## 2022-07-08 RX ORDER — LOSARTAN POTASSIUM 100 MG/1
100 TABLET ORAL DAILY
Qty: 90 TABLET | Refills: 3 | Status: SHIPPED | OUTPATIENT
Start: 2022-07-08 | End: 2023-04-11

## 2022-07-08 ASSESSMENT — FIBROSIS 4 INDEX: FIB4 SCORE: 1.2

## 2022-07-08 NOTE — ASSESSMENT & PLAN NOTE
This has been a problem for the patient off and on over the years.His trip gave him the opportunity in [place] and [place] to eat more pasta and his blood sugar is elevated at 107.  I expect this will improve over the next 6 months.  We will recheck at that time.

## 2022-07-08 NOTE — ASSESSMENT & PLAN NOTE
Chronic health problem, well controlled, continue with current meds and lifestyle.  Patient continues on Rosuvastatin and needs refill today.  His total  Cholesterol is great at 135, Triglycerides are good at 70, Hdl went up to 47 and LDL good at 74.

## 2022-07-08 NOTE — PROGRESS NOTES
Telemedicine Visit: Established Patient     This encounter was conducted via Zoom.   Verbal consent was obtained. Patient's identity was verified.  As a means of avoiding spread of COVID-19, this visit is being conducted by Telemedicine.         Subjective:   CC: Diagnoses of Essential hypertension, Hypercholesterolemia, and Prediabetes were pertinent to this visit.    Arias Nath is a 61 y.o. male presenting for evaluation and management of:      Essential hypertension  Chronic health problem, well controlled, continue with current meds and lifestyle.  Pt has just returned from 3 weeks in [place] and [place] and admits he ate poorly.  He did a ton a walking and his Bp has been great all along.      Hypercholesterolemia  Chronic health problem, well controlled, continue with current meds and lifestyle.  Patient continues on Rosuvastatin and needs refill today.  His total  Cholesterol is great at 135, Triglycerides are good at 70, Hdl went up to 47 and LDL good at 74.      Prediabetes  This has been a problem for the patient off and on over the years.His trip gave him the opportunity in [place] and [place] to eat more pasta and his blood sugar is elevated at 107.  I expect this will improve over the next 6 months.  We will recheck at that time.      ROS:     - Constitutional:  Negative for fever, chills, unexpected weight change, and fatigue/generalized weakness.    - HEENT:  Negative for headaches, vision changes, hearing changes, ear pain, ear discharge, rhinorrhea, sinus congestion, sore throat, and neck pain.      - Respiratory: Negative for cough, sputum production, chest congestion, dyspnea, wheezing, and crackles.      - Cardiovascular: Negative for chest pain, palpitations, orthopnea, and bilateral lower extremity edema.     - Gastrointestinal: Negative for heartburn, nausea, vomiting, abdominal pain, hematochezia, melena, diarrhea, constipation, and greasy/foul-smelling stools.     - Genitourinary: Negative for  dysuria, polyuria, hematuria, pyuria, urinary urgency, and urinary incontinence.     - Musculoskeletal: Negative for myalgias, back pain, and joint pain.     - Skin: Negative for rash, itching, cyanotic skin color change.     - Neurological: Negative for dizziness, tingling, tremors, focal sensory deficit, focal weakness and headaches.     - Endo/Heme/Allergies: Does not bruise/bleed easily.     - Psychiatric/Behavioral: Negative for depression, suicidal/homicidal ideation and memory loss.          - NOTE: All other systems reviewed and are negative, except as in HPI.      Patient Active Problem List    Diagnosis Date Noted   • Prediabetes 07/08/2022   • History of malignant melanoma of skin 01/07/2022   • Hypercholesterolemia 11/06/2019   • Vitamin D deficiency 09/03/2019   • Wellness examination 08/02/2019   • Olecranon bursitis of left elbow 06/27/2018   • Essential hypertension 05/11/2018   • Obesity (BMI 35.0-39.9 without comorbidity) ([facility]) 05/11/2018      Allergies:Ace inhibitors    Current Outpatient Medications   Medication Sig Dispense Refill   • amLODIPine (NORVASC) 5 MG Tab Take 1 Tablet by mouth every day. 90 Tablet 3   • losartan (COZAAR) 100 MG Tab Take 1 Tablet by mouth every day. 90 Tablet 3   • rosuvastatin (CRESTOR) 5 MG Tab Take 1 Tablet by mouth every evening. 90 Tablet 3   • Cholecalciferol (VITAMIN D3) 125 MCG (5000 UT) Cap Take 1 Cap by mouth every day. 30 Cap      No current facility-administered medications for this visit.       Social History     Tobacco Use   • Smoking status: Never Smoker   • Smokeless tobacco: Never Used   Substance Use Topics   • Alcohol use: Yes     Alcohol/week: 0.6 oz     Types: 1 Cans of beer per week     Comment: social   • Drug use: No     Social History     Social History Narrative   • Not on file       Family History   Problem Relation Age of Onset   • Colon Cancer Mother    • Hypertension Mother    • Hyperlipidemia Mother    • No Known Problems Father            "Objective:   Vitals obtained by patient:    /83 (BP Location: Left arm, Patient Position: Sitting, BP Cuff Size: Adult)   Pulse 79   Temp 36.6 °C (97.9 °F) (Temporal)   Ht 1.803 m (5' 11\")   Wt 117 kg (257 lb)   Body mass index is 35.84 kg/m².    Physical Exam:  Constitutional: Alert, no distress, well-groomed.  Skin: No rashes in visible areas.  Eye: Round. Conjunctiva clear, lids normal. No icterus.   ENMT: Lips pink without lesions, good dentition, moist mucous membranes. Phonation normal.  Neck: No masses, no thyromegaly. Moves freely without pain.  CV: Pulse as reported by patient  Respiratory: Unlabored respiratory effort, no cough or audible wheeze  Psych: Alert and oriented x3, normal affect and mood.   LABS: 7/5/2022: Results reviewed and discussed with the patient, questions answered.      Assessment and Plan:   The following treatment plan was discussed:     1. Essential hypertension  Chronic health problem well-controlled with current medications and lifestyle.  Patient will continue to work on this.  Plan to recheck in a little less than 6 months.    2. Hypercholesterolemia  Chronic health problem adequately controlled.  He will continue to work on lifestyle management.  - Comp Metabolic Panel; Future  - Lipid Profile; Future    3. Prediabetes  This is a returned chronic problem.  Patient actually had gotten his glucose down for over a year but just returned from a [place] trip [place] where he was able to have fresh pasta.  He admits that while traveling he had [place] probably twice a day and this elevated his blood sugar again.  He will get this back under control.  - HEMOGLOBIN A1C; Future            Follow-up: 6 months with labs prior to the visit.          "

## 2022-07-08 NOTE — ASSESSMENT & PLAN NOTE
Chronic health problem, well controlled, continue with current meds and lifestyle.  Pt has just returned from 3 weeks in [place] and [place] and admits he ate poorly.  He did a ton a walking and his Bp has been great all along.

## 2022-07-12 ENCOUNTER — PATIENT MESSAGE (OUTPATIENT)
Dept: MEDICAL GROUP | Facility: PHYSICIAN GROUP | Age: 62
End: 2022-07-12
Payer: COMMERCIAL

## 2022-07-14 RX ORDER — AMOXICILLIN AND CLAVULANATE POTASSIUM 875; 125 MG/1; MG/1
1 TABLET, FILM COATED ORAL 2 TIMES DAILY
Qty: 14 TABLET | Refills: 0 | Status: SHIPPED | OUTPATIENT
Start: 2022-07-14 | End: 2022-11-04

## 2022-08-22 NOTE — PROCEDURE: OBSERVATION
Detail Level: Detailed
Size Of Lesion In Cm (Optional): 0
Niacinamide Counseling: I recommended taking niacin or niacinamide, also know as vitamin B3, twice daily. Recent evidence suggests that taking vitamin B3 (500 mg twice daily) can reduce the risk of actinic keratoses and non-melanoma skin cancers. Side effects of vitamin B3 include flushing and headache.

## 2022-10-10 ENCOUNTER — APPOINTMENT (RX ONLY)
Dept: URBAN - METROPOLITAN AREA CLINIC 4 | Facility: CLINIC | Age: 62
Setting detail: DERMATOLOGY
End: 2022-10-10

## 2022-10-10 DIAGNOSIS — L57.8 OTHER SKIN CHANGES DUE TO CHRONIC EXPOSURE TO NONIONIZING RADIATION: ICD-10-CM

## 2022-10-10 DIAGNOSIS — L82.1 OTHER SEBORRHEIC KERATOSIS: ICD-10-CM

## 2022-10-10 DIAGNOSIS — Z85.820 PERSONAL HISTORY OF MALIGNANT MELANOMA OF SKIN: ICD-10-CM

## 2022-10-10 DIAGNOSIS — D18.0 HEMANGIOMA: ICD-10-CM

## 2022-10-10 DIAGNOSIS — D22 MELANOCYTIC NEVI: ICD-10-CM

## 2022-10-10 DIAGNOSIS — B07.8 OTHER VIRAL WARTS: ICD-10-CM

## 2022-10-10 DIAGNOSIS — L81.4 OTHER MELANIN HYPERPIGMENTATION: ICD-10-CM

## 2022-10-10 PROBLEM — D22.5 MELANOCYTIC NEVI OF TRUNK: Status: ACTIVE | Noted: 2022-10-10

## 2022-10-10 PROBLEM — D18.01 HEMANGIOMA OF SKIN AND SUBCUTANEOUS TISSUE: Status: ACTIVE | Noted: 2022-10-10

## 2022-10-10 PROCEDURE — ? LIQUID NITROGEN

## 2022-10-10 PROCEDURE — 99213 OFFICE O/P EST LOW 20 MIN: CPT | Mod: 25

## 2022-10-10 PROCEDURE — 17110 DESTRUCTION B9 LES UP TO 14: CPT

## 2022-10-10 PROCEDURE — ? COUNSELING

## 2022-10-10 ASSESSMENT — LOCATION ZONE DERM
LOCATION ZONE: FACE
LOCATION ZONE: TRUNK
LOCATION ZONE: HAND
LOCATION ZONE: LEG
LOCATION ZONE: ARM

## 2022-10-10 ASSESSMENT — LOCATION SIMPLE DESCRIPTION DERM
LOCATION SIMPLE: LEFT CHEEK
LOCATION SIMPLE: RIGHT LOWER EXTREMITY
LOCATION SIMPLE: LEFT ELBOW
LOCATION SIMPLE: RIGHT CHEEK
LOCATION SIMPLE: BACK
LOCATION SIMPLE: LEFT UPPER EXTREMITY
LOCATION SIMPLE: RIGHT HAND
LOCATION SIMPLE: RIGHT UPPER EXTREMITY
LOCATION SIMPLE: LEFT LOWER EXTREMITY
LOCATION SIMPLE: LEFT HAND

## 2022-10-10 ASSESSMENT — LOCATION DETAILED DESCRIPTION DERM
LOCATION DETAILED: LEFT UPPER BACK
LOCATION DETAILED: RIGHT UPPER BACK
LOCATION DETAILED: RIGHT DORSAL HAND
LOCATION DETAILED: RIGHT ANTERIOR THIGH
LOCATION DETAILED: LEFT ELBOW
LOCATION DETAILED: LEFT CHEEK
LOCATION DETAILED: RIGHT DORSAL FOREARM
LOCATION DETAILED: LEFT DORSAL HAND
LOCATION DETAILED: RIGHT CHEEK
LOCATION DETAILED: LEFT ANTERIOR THIGH
LOCATION DETAILED: LEFT DORSAL FOREARM
LOCATION DETAILED: LEFT MID BACK

## 2022-10-10 NOTE — PROCEDURE: LIQUID NITROGEN
Detail Level: Detailed
Show Spray Paint Technique Variable?: Yes
Medical Necessity Information: It is in your best interest to select a reason for this procedure from the list below. All of these items fulfill various CMS LCD requirements except the new and changing color options.
Aperture Size (Optional): C
Duration Of Freeze Thaw-Cycle (Seconds): 3
Number Of Freeze-Thaw Cycles: 1 freeze-thaw cycle
Post-Care Instructions: I reviewed with the patient in detail post-care instructions. Patient is to wear sunprotection, and avoid picking at any of the treated lesions. Pt may apply Vaseline to crusted or scabbing areas.
Spray Paint Technique: No
Spray Paint Text: The liquid nitrogen was applied to the skin utilizing a spray paint frosting technique.
Consent: The patient's consent was obtained including but not limited to risks of crusting, scabbing, blistering, scarring, darker or lighter pigmentary change, recurrence, incomplete removal and infection.
Medical Necessity Clause: This procedure was medically necessary because the lesions that were treated were:

## 2022-11-02 ENCOUNTER — PATIENT MESSAGE (OUTPATIENT)
Dept: INTERNAL MEDICINE | Facility: IMAGING CENTER | Age: 62
End: 2022-11-02
Payer: COMMERCIAL

## 2022-11-03 ENCOUNTER — PATIENT MESSAGE (OUTPATIENT)
Dept: INTERNAL MEDICINE | Facility: IMAGING CENTER | Age: 62
End: 2022-11-03
Payer: COMMERCIAL

## 2022-11-04 ENCOUNTER — OFFICE VISIT (OUTPATIENT)
Dept: INTERNAL MEDICINE | Facility: IMAGING CENTER | Age: 62
End: 2022-11-04
Payer: COMMERCIAL

## 2022-11-04 VITALS
WEIGHT: 264 LBS | HEART RATE: 63 BPM | RESPIRATION RATE: 16 BRPM | DIASTOLIC BLOOD PRESSURE: 76 MMHG | HEIGHT: 71 IN | TEMPERATURE: 96.9 F | BODY MASS INDEX: 36.96 KG/M2 | SYSTOLIC BLOOD PRESSURE: 132 MMHG | OXYGEN SATURATION: 96 %

## 2022-11-04 DIAGNOSIS — M54.42 ACUTE LEFT-SIDED BACK PAIN WITH SCIATICA: ICD-10-CM

## 2022-11-04 DIAGNOSIS — I10 ESSENTIAL HYPERTENSION: ICD-10-CM

## 2022-11-04 DIAGNOSIS — E78.00 HYPERCHOLESTEROLEMIA: ICD-10-CM

## 2022-11-04 DIAGNOSIS — R73.03 PREDIABETES: ICD-10-CM

## 2022-11-04 PROBLEM — M54.50 ACUTE BILATERAL LOW BACK PAIN WITHOUT SCIATICA: Status: ACTIVE | Noted: 2022-11-04

## 2022-11-04 PROCEDURE — 99214 OFFICE O/P EST MOD 30 MIN: CPT | Performed by: FAMILY MEDICINE

## 2022-11-04 RX ORDER — METHYLPREDNISOLONE 4 MG/1
TABLET ORAL
Qty: 21 TABLET | Refills: 0 | Status: SHIPPED | OUTPATIENT
Start: 2022-11-04 | End: 2022-12-23

## 2022-11-04 ASSESSMENT — FIBROSIS 4 INDEX: FIB4 SCORE: 1.2

## 2022-11-04 NOTE — ASSESSMENT & PLAN NOTE
This is an acute on chronic problem with the patient previously developed right-sided sciatica after playing some golf and not stretching.  He then 2 weeks ago went and played pickle ball with his daughter without doing adequate stretching and developed left-sided low back pain that now radiates into his buttocks for the last 7-10 days.  He does not have any red flag symptoms of pelvic disorder his anterior tibial and posterior tibialis reflexes are 2+/4 and equal to the right.  He has good muscle strength at the ankles bilaterally.  I think he nearly has exacerbated the sciatica because of his longstanding osteoarthritis in his lumbar spine area.  We are going to try him on a Medrol Dosepak, muscle relaxants and send him back to physical therapy which helped him when it was on the right side several weeks ago.

## 2022-11-04 NOTE — ASSESSMENT & PLAN NOTE
This is a chronic health problem for this patient for which she is on amlodipine 5 mg daily along with losartan 100 mg daily.  His blood pressure today is 132/76.  Patient denies chest pain, shortness of breath or dyspnea on exertion.

## 2022-11-04 NOTE — ASSESSMENT & PLAN NOTE
This is a chronic health problem for this patient that when we last checked his blood work in July his fasting glucose was 107.  We need to get him to do blood work and an A1c so we can see whether or not he may have tipped over into diabetes.

## 2022-11-04 NOTE — ASSESSMENT & PLAN NOTE
This is a chronic health problem for this patient for which she is on rosuvastatin 5 mg daily.  We need to get blood work ordered so that he can have testing done and we will see him back in December 2022.

## 2022-11-04 NOTE — PROGRESS NOTES
CC: Diagnoses of Essential hypertension, Hypercholesterolemia, Prediabetes, Acute bilateral low back pain without sciatica, and Acute left-sided back pain with sciatica were pertinent to this visit.                                                                                                                                         HPI:   Arias presents today with the following concerns:    1. Essential hypertension  Chronic health problem, well controlled, continue with current meds and lifestyle.      2. Hypercholesterolemia  Chronic health problem with unknown control, patient going to do blood work prior to his return.  I am concerned after looking at his dietary choices in his diary on fitness pal that he is not getting adequate protein.  Were going to have him add a protein drink of at least 30 g of protein daily.    3. Prediabetes  Patient working on his chronic health problem trying to lose weight knowing that will help with his prediabetes    4. Acute left-sided back pain with sciatica  This is an acute problem that brought the patient in.  We are going to give him a Medrol Dosepak he prefers not having muscle relaxants and will go back to physical therapy.  Patient tells me he is getting ready to do a skiing trip for 2 weeks to [place] in January.  I am hopeful that physical therapy will help to prepare him so this will not occur on his vacation.       Patient Active Problem List    Diagnosis Date Noted    Acute bilateral low back pain without sciatica 11/04/2022    Acute left-sided back pain with sciatica 11/04/2022    Prediabetes 07/08/2022    History of malignant melanoma of skin 01/07/2022    Hypercholesterolemia 11/06/2019    Vitamin D deficiency 09/03/2019    Wellness examination 08/02/2019    Olecranon bursitis of left elbow 06/27/2018    Essential hypertension 05/11/2018    Obesity (BMI 35.0-39.9 without comorbidity) ([facility]) 05/11/2018       Current Outpatient Medications   Medication Sig  "Dispense Refill    methylPREDNISolone (MEDROL DOSEPAK) 4 MG Tablet Therapy Pack As directed on the packaging label. 21 Tablet 0    amLODIPine (NORVASC) 5 MG Tab Take 1 Tablet by mouth every day. 90 Tablet 3    losartan (COZAAR) 100 MG Tab Take 1 Tablet by mouth every day. 90 Tablet 3    rosuvastatin (CRESTOR) 5 MG Tab Take 1 Tablet by mouth every evening. 90 Tablet 3    Cholecalciferol (VITAMIN D3) 125 MCG (5000 UT) Cap Take 1 Cap by mouth every day. 30 Cap      No current facility-administered medications for this visit.         Allergies as of 11/04/2022 - Reviewed 11/04/2022   Allergen Reaction Noted    Ace inhibitors Cough 08/02/2019            /76 (BP Location: Right arm, Patient Position: Sitting, BP Cuff Size: Adult)   Pulse 63   Temp 36.1 °C (96.9 °F) (Temporal)   Resp 16   Ht 1.803 m (5' 11\")   Wt 120 kg (264 lb)   SpO2 96%   BMI 36.82 kg/m²     Physical Exam:  Gen:         Alert and oriented, No apparent distress.  Neck:        No Lymphadenopathy or Bruits.  Lungs:     Clear to auscultation bilaterally  CV:          Regular rate and rhythm. No murmurs, rubs or gallops.               Ext:          No clubbing, cyanosis, edema.  Low back: Nontender to palpation of the spinous processes and paraspinous muscles.  Straight leg raising is negative on the left on the right.  Muscle strength in the left lower extremity is 4+/4 and equal to the right.  DTRs at the knee and ankles are 2+/4 and equal bilaterally.        Assessment and Plan.   61 y.o. male with the following issues:    Essential hypertension  This is a chronic health problem for this patient for which she is on amlodipine 5 mg daily along with losartan 100 mg daily.  His blood pressure today is 132/76.  Patient denies chest pain, shortness of breath or dyspnea on exertion.    Hypercholesterolemia  This is a chronic health problem for this patient for which she is on rosuvastatin 5 mg daily.  We need to get blood work ordered so that he " can have testing done and we will see him back in December 2022.    Prediabetes  This is a chronic health problem for this patient that when we last checked his blood work in July his fasting glucose was 107.  We need to get him to do blood work and an A1c so we can see whether or not he may have tipped over into diabetes.    Acute left-sided back pain with sciatica  This is an acute on chronic problem with the patient previously developed right-sided sciatica after playing some golf and not stretching.  He then 2 weeks ago went and played pickle ball with his daughter without doing adequate stretching and developed left-sided low back pain that now radiates into his buttocks for the last 7-10 days.  He does not have any red flag symptoms of pelvic disorder his anterior tibial and posterior tibialis reflexes are 2+/4 and equal to the right.  He has good muscle strength at the ankles bilaterally.  I think he nearly has exacerbated the sciatica because of his longstanding osteoarthritis in his lumbar spine area.  We are going to try him on a Medrol Dosepak, muscle relaxants and send him back to physical therapy which helped him when it was on the right side several weeks ago.

## 2022-11-12 ENCOUNTER — PATIENT MESSAGE (OUTPATIENT)
Dept: INTERNAL MEDICINE | Facility: IMAGING CENTER | Age: 62
End: 2022-11-12
Payer: COMMERCIAL

## 2022-11-15 ENCOUNTER — PATIENT MESSAGE (OUTPATIENT)
Dept: INTERNAL MEDICINE | Facility: IMAGING CENTER | Age: 62
End: 2022-11-15
Payer: COMMERCIAL

## 2022-12-19 ENCOUNTER — HOSPITAL ENCOUNTER (OUTPATIENT)
Dept: LAB | Facility: MEDICAL CENTER | Age: 62
End: 2022-12-19
Attending: FAMILY MEDICINE
Payer: COMMERCIAL

## 2022-12-19 DIAGNOSIS — E78.00 HYPERCHOLESTEROLEMIA: ICD-10-CM

## 2022-12-19 DIAGNOSIS — R73.03 PREDIABETES: ICD-10-CM

## 2022-12-19 LAB
ALBUMIN SERPL BCP-MCNC: 4.4 G/DL (ref 3.2–4.9)
ALBUMIN/GLOB SERPL: 1.4 G/DL
ALP SERPL-CCNC: 98 U/L (ref 30–99)
ALT SERPL-CCNC: 37 U/L (ref 2–50)
ANION GAP SERPL CALC-SCNC: 12 MMOL/L (ref 7–16)
AST SERPL-CCNC: 27 U/L (ref 12–45)
BILIRUB SERPL-MCNC: 0.6 MG/DL (ref 0.1–1.5)
BUN SERPL-MCNC: 14 MG/DL (ref 8–22)
CALCIUM ALBUM COR SERPL-MCNC: 9.7 MG/DL (ref 8.5–10.5)
CALCIUM SERPL-MCNC: 10 MG/DL (ref 8.5–10.5)
CHLORIDE SERPL-SCNC: 105 MMOL/L (ref 96–112)
CHOLEST SERPL-MCNC: 126 MG/DL (ref 100–199)
CO2 SERPL-SCNC: 25 MMOL/L (ref 20–33)
CREAT SERPL-MCNC: 1 MG/DL (ref 0.5–1.4)
EST. AVERAGE GLUCOSE BLD GHB EST-MCNC: 131 MG/DL
GFR SERPLBLD CREATININE-BSD FMLA CKD-EPI: 86 ML/MIN/1.73 M 2
GLOBULIN SER CALC-MCNC: 3.1 G/DL (ref 1.9–3.5)
GLUCOSE SERPL-MCNC: 102 MG/DL (ref 65–99)
HBA1C MFR BLD: 6.2 % (ref 4–5.6)
HDLC SERPL-MCNC: 45 MG/DL
LDLC SERPL CALC-MCNC: 63 MG/DL
POTASSIUM SERPL-SCNC: 4.7 MMOL/L (ref 3.6–5.5)
PROT SERPL-MCNC: 7.5 G/DL (ref 6–8.2)
SODIUM SERPL-SCNC: 142 MMOL/L (ref 135–145)
TRIGL SERPL-MCNC: 91 MG/DL (ref 0–149)

## 2022-12-19 PROCEDURE — 36415 COLL VENOUS BLD VENIPUNCTURE: CPT

## 2022-12-19 PROCEDURE — 80053 COMPREHEN METABOLIC PANEL: CPT

## 2022-12-19 PROCEDURE — 80061 LIPID PANEL: CPT

## 2022-12-19 PROCEDURE — 83036 HEMOGLOBIN GLYCOSYLATED A1C: CPT

## 2022-12-23 ENCOUNTER — OFFICE VISIT (OUTPATIENT)
Dept: INTERNAL MEDICINE | Facility: IMAGING CENTER | Age: 62
End: 2022-12-23
Payer: COMMERCIAL

## 2022-12-23 VITALS
HEART RATE: 89 BPM | BODY MASS INDEX: 35.28 KG/M2 | SYSTOLIC BLOOD PRESSURE: 122 MMHG | TEMPERATURE: 96.8 F | RESPIRATION RATE: 16 BRPM | DIASTOLIC BLOOD PRESSURE: 78 MMHG | HEIGHT: 71 IN | OXYGEN SATURATION: 95 % | WEIGHT: 252 LBS

## 2022-12-23 DIAGNOSIS — E78.00 HYPERCHOLESTEROLEMIA: ICD-10-CM

## 2022-12-23 DIAGNOSIS — I10 ESSENTIAL HYPERTENSION: ICD-10-CM

## 2022-12-23 DIAGNOSIS — R73.03 PREDIABETES: ICD-10-CM

## 2022-12-23 DIAGNOSIS — E66.9 OBESITY (BMI 35.0-39.9 WITHOUT COMORBIDITY): ICD-10-CM

## 2022-12-23 PROCEDURE — 99214 OFFICE O/P EST MOD 30 MIN: CPT | Performed by: FAMILY MEDICINE

## 2022-12-23 ASSESSMENT — FIBROSIS 4 INDEX: FIB4 SCORE: 1.29

## 2022-12-23 NOTE — ASSESSMENT & PLAN NOTE
This is a chronic health problem that is well controlled with rosuvastatin 5mg daily.  His total cholesterol is down to 126, triglycerides 91, HDL 45 and LDL 63.

## 2022-12-23 NOTE — ASSESSMENT & PLAN NOTE
This is a chronic health problem that is well controlled on amlodipine 5 mg daily and losartan 100 mg daily.  He denies chest pain, SOB or KAY.

## 2022-12-23 NOTE — ASSESSMENT & PLAN NOTE
This is a chronic health problem that is showing remarkable improvement with his having lost 12 lbs.  His fasting glucose is improved to 102 and A1c is 6.2.

## 2022-12-23 NOTE — PROGRESS NOTES
"      CC: Diagnoses of Essential hypertension, Prediabetes, Hypercholesterolemia, and Obesity (BMI 35.0-39.9 without comorbidity) (HCC) were pertinent to this visit.                                                                                                                                         HPI:   Arias presents today with the following concerns:    1. Essential hypertension  Chronic problem controlled    2. Prediabetes  Chronic problem with borderline control.    3. Hypercholesterolemia  Chronic health problem well controlled    4. Obesity (BMI 35.0-39.9 without comorbidity) (HCC)  Chronic health problem showing improvement with his new plan for weight loss.       Patient Active Problem List    Diagnosis Date Noted    Acute bilateral low back pain without sciatica 11/04/2022    Acute left-sided back pain with sciatica 11/04/2022    Prediabetes 07/08/2022    History of malignant melanoma of skin 01/07/2022    Hypercholesterolemia 11/06/2019    Vitamin D deficiency 09/03/2019    Wellness examination 08/02/2019    Olecranon bursitis of left elbow 06/27/2018    Essential hypertension 05/11/2018    Obesity (BMI 35.0-39.9 without comorbidity) (HCC) 05/11/2018       Current Outpatient Medications   Medication Sig Dispense Refill    amLODIPine (NORVASC) 5 MG Tab Take 1 Tablet by mouth every day. 90 Tablet 3    losartan (COZAAR) 100 MG Tab Take 1 Tablet by mouth every day. 90 Tablet 3    rosuvastatin (CRESTOR) 5 MG Tab Take 1 Tablet by mouth every evening. 90 Tablet 3    Cholecalciferol (VITAMIN D3) 125 MCG (5000 UT) Cap Take 1 Cap by mouth every day. 30 Cap      No current facility-administered medications for this visit.         Allergies as of 12/23/2022 - Reviewed 12/23/2022   Allergen Reaction Noted    Ace inhibitors Cough 08/02/2019            /78   Pulse 89   Temp 36 °C (96.8 °F) (Temporal)   Resp 16   Ht 1.803 m (5' 11\")   Wt 114 kg (252 lb)   SpO2 95%   BMI 35.15 kg/m²     Physical " Exam:  Gen:         Alert and oriented, No apparent distress.  Neck:        No Lymphadenopathy or Bruits.  Lungs:     Clear to auscultation bilaterally  CV:          Regular rate and rhythm. No murmurs, rubs or gallops.               Ext:          No clubbing, cyanosis, edema.    LABS: 12/19/22: Results reviewed and discussed with the patient, questions answered.      Assessment and Plan.   62 y.o. male with the following issues:    Essential hypertension  This is a chronic health problem that is well controlled on amlodipine 5 mg daily and losartan 100 mg daily.  He denies chest pain, SOB or KAY.      Prediabetes  This is a chronic health problem that is showing remarkable improvement with his having lost 12 lbs.  His fasting glucose is improved to 102 and A1c is 6.2.    Hypercholesterolemia  This is a chronic health problem that is well controlled with rosuvastatin 5mg daily.  His total cholesterol is down to 126, triglycerides 91, HDL 45 and LDL 63.    Obesity (BMI 35.0-39.9 without comorbidity) (HCC)  This is a chronic health problem that is showing improvement with his weight loss os 12#.  Pt has a program in place and is doing well.

## 2022-12-23 NOTE — ASSESSMENT & PLAN NOTE
This is a chronic health problem that is showing improvement with his weight loss os 12#.  Pt has a program in place and is doing well.

## 2023-02-07 ENCOUNTER — PATIENT MESSAGE (OUTPATIENT)
Dept: INTERNAL MEDICINE | Facility: IMAGING CENTER | Age: 63
End: 2023-02-07
Payer: COMMERCIAL

## 2023-02-07 DIAGNOSIS — M54.42 ACUTE LEFT-SIDED BACK PAIN WITH SCIATICA: ICD-10-CM

## 2023-02-13 ENCOUNTER — APPOINTMENT (RX ONLY)
Dept: URBAN - METROPOLITAN AREA CLINIC 4 | Facility: CLINIC | Age: 63
Setting detail: DERMATOLOGY
End: 2023-02-13

## 2023-02-13 DIAGNOSIS — D22 MELANOCYTIC NEVI: ICD-10-CM

## 2023-02-13 DIAGNOSIS — L57.8 OTHER SKIN CHANGES DUE TO CHRONIC EXPOSURE TO NONIONIZING RADIATION: ICD-10-CM

## 2023-02-13 DIAGNOSIS — L81.4 OTHER MELANIN HYPERPIGMENTATION: ICD-10-CM

## 2023-02-13 DIAGNOSIS — L82.1 OTHER SEBORRHEIC KERATOSIS: ICD-10-CM

## 2023-02-13 DIAGNOSIS — Z85.820 PERSONAL HISTORY OF MALIGNANT MELANOMA OF SKIN: ICD-10-CM

## 2023-02-13 DIAGNOSIS — D18.0 HEMANGIOMA: ICD-10-CM

## 2023-02-13 PROBLEM — D18.01 HEMANGIOMA OF SKIN AND SUBCUTANEOUS TISSUE: Status: ACTIVE | Noted: 2023-02-13

## 2023-02-13 PROBLEM — D22.5 MELANOCYTIC NEVI OF TRUNK: Status: ACTIVE | Noted: 2023-02-13

## 2023-02-13 PROCEDURE — 99213 OFFICE O/P EST LOW 20 MIN: CPT

## 2023-02-13 PROCEDURE — ? COUNSELING

## 2023-02-13 ASSESSMENT — LOCATION SIMPLE DESCRIPTION DERM
LOCATION SIMPLE: RIGHT HAND
LOCATION SIMPLE: LEFT UPPER EXTREMITY
LOCATION SIMPLE: LEFT LOWER EXTREMITY
LOCATION SIMPLE: RIGHT UPPER EXTREMITY
LOCATION SIMPLE: RIGHT LOWER EXTREMITY
LOCATION SIMPLE: RIGHT CHEEK
LOCATION SIMPLE: LEFT CHEEK
LOCATION SIMPLE: BACK
LOCATION SIMPLE: LEFT HAND

## 2023-02-13 ASSESSMENT — LOCATION DETAILED DESCRIPTION DERM
LOCATION DETAILED: LEFT DORSAL HAND
LOCATION DETAILED: LEFT DORSAL FOREARM
LOCATION DETAILED: RIGHT DORSAL HAND
LOCATION DETAILED: RIGHT ANTERIOR THIGH
LOCATION DETAILED: RIGHT CHEEK
LOCATION DETAILED: RIGHT UPPER BACK
LOCATION DETAILED: RIGHT DORSAL FOREARM
LOCATION DETAILED: LEFT UPPER BACK
LOCATION DETAILED: LEFT CHEEK
LOCATION DETAILED: LEFT ANTERIOR THIGH

## 2023-02-13 ASSESSMENT — LOCATION ZONE DERM
LOCATION ZONE: TRUNK
LOCATION ZONE: FACE
LOCATION ZONE: ARM
LOCATION ZONE: LEG
LOCATION ZONE: HAND

## 2023-02-13 NOTE — HPI: EVALUATION OF SKIN LESION(S)
How Severe Are Your Spot(S)?: mild
Have Your Spot(S) Been Treated In The Past?: has not been treated
Hpi Title: Evaluation of Skin Lesions
Location: Right cheek
Year Removed: 2021

## 2023-02-16 ENCOUNTER — APPOINTMENT (OUTPATIENT)
Dept: RADIOLOGY | Facility: MEDICAL CENTER | Age: 63
End: 2023-02-16
Attending: FAMILY MEDICINE
Payer: COMMERCIAL

## 2023-02-16 DIAGNOSIS — M54.42 ACUTE LEFT-SIDED BACK PAIN WITH SCIATICA: ICD-10-CM

## 2023-02-16 PROCEDURE — 72148 MRI LUMBAR SPINE W/O DYE: CPT

## 2023-02-22 DIAGNOSIS — M54.42 ACUTE LEFT-SIDED BACK PAIN WITH SCIATICA: ICD-10-CM

## 2023-02-27 ENCOUNTER — OFFICE VISIT (OUTPATIENT)
Dept: PHYSICAL MEDICINE AND REHAB | Facility: MEDICAL CENTER | Age: 63
End: 2023-02-27
Payer: COMMERCIAL

## 2023-02-27 VITALS
DIASTOLIC BLOOD PRESSURE: 72 MMHG | HEART RATE: 68 BPM | OXYGEN SATURATION: 97 % | SYSTOLIC BLOOD PRESSURE: 128 MMHG | HEIGHT: 71 IN | WEIGHT: 260.8 LBS | TEMPERATURE: 97.1 F | BODY MASS INDEX: 36.51 KG/M2

## 2023-02-27 DIAGNOSIS — M54.16 LUMBAR RADICULOPATHY: ICD-10-CM

## 2023-02-27 DIAGNOSIS — M47.816 LUMBAR SPONDYLOSIS: ICD-10-CM

## 2023-02-27 DIAGNOSIS — E66.9 OBESITY (BMI 30-39.9): ICD-10-CM

## 2023-02-27 DIAGNOSIS — M54.42 CHRONIC LEFT-SIDED LOW BACK PAIN WITH LEFT-SIDED SCIATICA: ICD-10-CM

## 2023-02-27 DIAGNOSIS — M53.3 SI (SACROILIAC) JOINT DYSFUNCTION: ICD-10-CM

## 2023-02-27 DIAGNOSIS — Z71.82 EXERCISE COUNSELING: ICD-10-CM

## 2023-02-27 DIAGNOSIS — G89.29 CHRONIC LEFT-SIDED LOW BACK PAIN WITH LEFT-SIDED SCIATICA: ICD-10-CM

## 2023-02-27 PROCEDURE — 99204 OFFICE O/P NEW MOD 45 MIN: CPT | Performed by: PHYSICAL MEDICINE & REHABILITATION

## 2023-02-27 RX ORDER — IBUPROFEN 200 MG
400 TABLET ORAL EVERY 6 HOURS PRN
COMMUNITY
End: 2023-04-13

## 2023-02-27 ASSESSMENT — FIBROSIS 4 INDEX: FIB4 SCORE: 1.29

## 2023-02-27 ASSESSMENT — PAIN SCALES - GENERAL: PAINLEVEL: 9=SEVERE PAIN

## 2023-02-27 ASSESSMENT — PATIENT HEALTH QUESTIONNAIRE - PHQ9: CLINICAL INTERPRETATION OF PHQ2 SCORE: 0

## 2023-02-27 NOTE — H&P (VIEW-ONLY)
New patient note    Interventional spine and Pain  Physiatry (physical medicine and  Rehabilitation)     Date of service: See epic    Chief complaint:   Chief Complaint   Patient presents with    New Patient     Back pain        Referring provider: Leesa Brewer M.D.     HISTORY    HPI: Arias Nath 62 y.o.  who presents today with Diagnoses of Lumbar radiculopathy, Lumbar spondylosis, SI (sacroiliac) joint dysfunction, Chronic left-sided low back pain with left-sided sciatica, Obesity (BMI 30-39.9), and Exercise counseling were pertinent to this visit.    HPI    Acute on chronic left low back pain radiating down the left leg. Intermittent with flares. This can range from 8/10 in intensity. This has been worsening over the past 5 months. He does yoga and a provider driven home exercise program including the past year. Associated numbness and tingling down the left leg. He went to [facility] for orthopedics, reports hip is doing well and they think that this is a spine issue. Patient went to 20 sessions of physical therapy.        Medical records review:  I reviewed the note from the referring provider No ref. provider Leesa Brewer M.D.  including the note dated 12/23/2022.  Hypertension, prediabetic, hypercholesterolemia, obesity.  I also reviewed multiple previous notes and physical therapy at [facility]..           ROS:   Red Flags ROS:   Fever, Chills, Sweats: Denies  Involuntary Weight Loss: Denies  Bladder Incontinence: Denies  Bowel Incontinence: denies  Saddle Anesthesia: Denies    All other systems reviewed and negative.       PMHx:   Past Medical History:   Diagnosis Date    Acute left-sided back pain with sciatica 11/4/2022    Cough 5/11/2018    Essential hypertension 5/11/2018    History of malignant melanoma of skin 1/7/2022    Found in 10/21 treated with excision by Dr. Mitchell.    Hypercholesterolemia 11/6/2019    Hypertension     Olecranon bursitis of left elbow 6/27/2018    Vitamin D deficiency  9/3/2019         No current facility-administered medications on file prior to visit.     Current Outpatient Medications on File Prior to Visit   Medication Sig Dispense Refill    ibuprofen (MOTRIN) 200 MG Tab Take 400 mg by mouth every 6 hours as needed.      amLODIPine (NORVASC) 5 MG Tab Take 1 Tablet by mouth every day. 90 Tablet 3    losartan (COZAAR) 100 MG Tab Take 1 Tablet by mouth every day. 90 Tablet 3    rosuvastatin (CRESTOR) 5 MG Tab Take 1 Tablet by mouth every evening. 90 Tablet 3    Cholecalciferol (VITAMIN D3) 125 MCG (5000 UT) Cap Take 1 Cap by mouth every day. 30 Cap         PSHx:   Past Surgical History:   Procedure Laterality Date    KNEE ARTHROSCOPY Right 2005       Family history   Family History   Problem Relation Age of Onset    Colon Cancer Mother     Hypertension Mother     Hyperlipidemia Mother     No Known Problems Father          Medications: reviewed on epic.   Outpatient Medications Marked as Taking for the 2/27/23 encounter (Office Visit) with Maximus Hernández M.D.   Medication Sig Dispense Refill    ibuprofen (MOTRIN) 200 MG Tab Take 400 mg by mouth every 6 hours as needed.      amLODIPine (NORVASC) 5 MG Tab Take 1 Tablet by mouth every day. 90 Tablet 3    losartan (COZAAR) 100 MG Tab Take 1 Tablet by mouth every day. 90 Tablet 3    rosuvastatin (CRESTOR) 5 MG Tab Take 1 Tablet by mouth every evening. 90 Tablet 3    Cholecalciferol (VITAMIN D3) 125 MCG (5000 UT) Cap Take 1 Cap by mouth every day. 30 Cap         Allergies:   Allergies   Allergen Reactions    Ace Inhibitors Cough       Social Hx:   Social History     Socioeconomic History    Marital status:      Spouse name: Not on file    Number of children: Not on file    Years of education: Not on file    Highest education level: Not on file   Occupational History    Not on file   Tobacco Use    Smoking status: Never    Smokeless tobacco: Never   Substance and Sexual Activity    Alcohol use: Yes     Alcohol/week: 0.6 oz      "Types: 1 Cans of beer per week     Comment: social    Drug use: No    Sexual activity: Yes     Partners: Female     Comment: , govt contractor owner   Other Topics Concern    Not on file   Social History Narrative    Not on file     Social Determinants of Health     Financial Resource Strain: Not on file   Food Insecurity: Not on file   Transportation Needs: Not on file   Physical Activity: Not on file   Stress: Not on file   Social Connections: Not on file   Intimate Partner Violence: Not on file   Housing Stability: Not on file         EXAMINATION     Physical Exam:   Vitals: /72 (BP Location: Right arm, Patient Position: Sitting, BP Cuff Size: Adult)   Pulse 68   Temp 36.2 °C (97.1 °F) (Temporal)   Ht 1.803 m (5' 11\")   Wt 118 kg (260 lb 12.9 oz)   SpO2 97%     Constitutional:   Body Habitus: Body mass index is 36.37 kg/m².  Cooperation: Fully cooperates with exam  Appearance: Well-groomed, well-nourished, not disheveled     Eyes: No scleral icterus to suggest severe liver disease, no proptosis to suggest severe hyperthyroid    ENT -no obvious auditory deficits, no obvious tongue lesions, tongue midline, no facial droop     Skin -no rashes or lesions noted     Respiratory-  breathing comfortable on room air, no audible wheezing    Cardiovascular- capillary refills less than 2 seconds.     Psychiatric- alert and oriented ×3. Normal affect.     Gait - normal gait, no use of ambulatory device, nonantalgic. .     Musculoskeletal and Neuro -                Thoracic/Lumbar Spine/Sacral Spine/Hips   Inspection: No evidence of atrophy in bilateral lower extremities throughout     ROM: decreased active range of motion with flexion, lateral flexion, and rotation bilaterally.   There is decreased active range of motion with lumbar extension with pain.    There is pain with facet loading maneuver (extension rotation) with axial low back pain on the BILATERAL side(s)    Palpation:   No tenderness to " palpation in midline at T1-T12 levels. No tenderness to palpation in the left and right of the midline T1-L5, NEGATIVE for tenderness to palpation to the para-midline region in the lower lumbar levels.  palpation over SI joint: negative bilaterally    palpation in hip or over the gluteus medius tendon insertion: negative bilaterally      Lumbar spine Special tests  Neuro tension  Straight leg test positive bilaterally    Slump test positive bilaterally      HIP  FAIR test negative bilaterally    Range of motion in the RIGHT hip is full  in flexion, extension, abduction, internal rotation, external rotation.  Range of motion in the LEFT hip is full  in flexion, extension, abduction, internal rotation, external rotation.      SI joint tests  SI joint compression negative right, positive left    SI joint distraction negative right, positive left    Thigh thrust test negative right, positive left    SREEKANTH test negative right, positive left                 Key points for the international standards for neurological classification of spinal cord injury (ISNCSCI) to light touch.     Dermatome R L                                      L2 2 2   L3 2 2   L4 2 2   L5 2 1   S1 2 2   S2 2 2       Motor Exam Lower Extremities    ? Myotome R L   Hip flexion L2 5 5   Knee extension L3 5 5   Ankle dorsiflexion L4 5 5-   Toe extension L5 4 4   Ankle plantarflexion S1 5 5         Reflexes  ?  R L                Patella  2+ 2+   Achilles   2+ 2+       Babinski sign negative bilaterally   Clonus of the ankle negative bilaterally       MEDICAL DECISION MAKING    Medical records review: see under HPI section.     DATA    Labs:   Lab Results   Component Value Date/Time    SODIUM 142 12/19/2022 08:40 AM    POTASSIUM 4.7 12/19/2022 08:40 AM    CHLORIDE 105 12/19/2022 08:40 AM    CO2 25 12/19/2022 08:40 AM    ANION 12.0 12/19/2022 08:40 AM    GLUCOSE 102 (H) 12/19/2022 08:40 AM    BUN 14 12/19/2022 08:40 AM    CREATININE 1.00 12/19/2022 08:40  AM    CALCIUM 10.0 12/19/2022 08:40 AM    ASTSGOT 27 12/19/2022 08:40 AM    ALTSGPT 37 12/19/2022 08:40 AM    TBILIRUBIN 0.6 12/19/2022 08:40 AM    ALBUMIN 4.4 12/19/2022 08:40 AM    TOTPROTEIN 7.5 12/19/2022 08:40 AM    GLOBULIN 3.1 12/19/2022 08:40 AM    AGRATIO 1.4 12/19/2022 08:40 AM   ]    No results found for: PROTHROMBTM, INR     Lab Results   Component Value Date/Time    WBC 5.6 04/05/2022 07:49 AM    RBC 5.32 04/05/2022 07:49 AM    HEMOGLOBIN 15.3 04/05/2022 07:49 AM    HEMATOCRIT 47.9 04/05/2022 07:49 AM    MCV 90.0 04/05/2022 07:49 AM    MCH 28.8 04/05/2022 07:49 AM    MCHC 31.9 (L) 04/05/2022 07:49 AM    MPV 9.0 04/05/2022 07:49 AM    NEUTSPOLYS 73.00 (H) 09/03/2019 12:46 PM    LYMPHOCYTES 12.20 (L) 09/03/2019 12:46 PM    MONOCYTES 9.60 09/03/2019 12:46 PM    EOSINOPHILS 1.70 09/03/2019 12:46 PM    BASOPHILS 0.90 09/03/2019 12:46 PM    HYPOCHROMIA 1+ 03/25/2011 09:07 AM        Lab Results   Component Value Date/Time    HBA1C 6.2 (H) 12/19/2022 08:40 AM        Imaging:   I personally reviewed following images, these are my reads  MRI lumbar spine 2/16/2023  Multiple anterior bridging osteophytes in the lower thoracic upper lumbar spine.  Significant facet arthropathy bilaterally at L4-5 and L5-S1.  Foraminal stenosis on the left at L4-5.        IMAGING radiology reads. I reviewed the following radiology reads                      Results for orders placed in visit on 02/16/23    MR-LUMBAR SPINE-W/O    Impression  1.  Degenerative disease in the lumbar spine as described above.  2.  There are combinations of facet joint arthropathy, prominent ligamentum flavum and small synovial cyst causing moderate effacement of the left lateral recess at the level of L4-5. The exiting left L5 nerve root might have been impinged at the lateral  recess.        Results for orders placed in visit on 02/16/23    MR-LUMBAR SPINE-W/O    Impression  1.  Degenerative disease in the lumbar spine as described above.  2.  There are  combinations of facet joint arthropathy, prominent ligamentum flavum and small synovial cyst causing moderate effacement of the left lateral recess at the level of L4-5. The exiting left L5 nerve root might have been impinged at the lateral  recess.                                                             Results for orders placed during the hospital encounter of 05/29/04    DX-KNEE COMPLETE 4+    Impression  IMPRESSION:      NEGATIVE RIGHT KNEE SERIES.              READING DR:        MIGDALIA COATS MD  READING DATE:      May 29 2004  6:29PM  REPORT STATUS:     FINAL REPORT    TRANSCRIPTION CODE:         MOC  TRANSCRIPTION DATE:         May 29 2004  9:48PM    THIS DOCUMENT HAS BEEN ELECTRONICALLY SIGNED BY:  MAGAN RIOS MD,  PhD - May 30 2004  7:03AM    Results for orders placed in visit on 08/04/22    DX-LUMBAR SPINE-4+ VIEWS                        Diagnosis   Visit Diagnoses     ICD-10-CM   1. Lumbar radiculopathy  M54.16   2. Lumbar spondylosis  M47.816   3. SI (sacroiliac) joint dysfunction  M53.3   4. Chronic left-sided low back pain with left-sided sciatica  M54.42    G89.29   5. Obesity (BMI 30-39.9)  E66.9   6. Exercise counseling  Z71.82           ASSESSMENT AND PLAN:  Arias Pham 62 y.o. male      Arias was seen today for new patient.    Diagnoses and all orders for this visit:    Lumbar radiculopathy  -     Referral to Physical Medicine Rehab    Lumbar spondylosis    SI (sacroiliac) joint dysfunction    Chronic left-sided low back pain with left-sided sciatica    Obesity (BMI 30-39.9)    Exercise counseling        I believe the patient's pain is multifactorial but mostly second    Lower lumbar radiculopathy L4-5 L5-S1 which is consistent with the patient's pain and symptoms.  Also likely contributing facet mediated pain as well as sacroiliac joint dysfunction.  He has failed conservative treatments.      home exercise program: I provided the patient with a strengthening and stretching  with a home exercise program     Diagnostic workup: Procedure below for diagnostic and therapeutic purposes    Medications:   ibuprofen 600mg TID PRN pain during flares of pain    Interventional program:   I have ordered a left L4-5 and L5-S1 transforaminal epidural steroid injection    The risks benefits and alternatives to this procedure were discussed and the patient wishes to proceed with the procedure. Risks include but are not limited to damage to surrounding structures, infection, bleeding, worsening of pain which can be permanent, weakness which can be permanent. Benefits include pain relief, improved function. Alternatives includes not doing the procedure.          Follow-up: After the above diagnostic and therapeutic procedure          Please note that this dictation was created using voice recognition software. I have made every reasonable attempt to correct obvious errors but there may be errors of grammar and content that I may have overlooked prior to finalization of this note.      Maximus Hernández MD  Physical Medicine and Rehabilitation  Interventional Spine and Sports Physiatry  [facility]          Leesa Brewer M.D.

## 2023-02-28 ENCOUNTER — HOSPITAL ENCOUNTER (OUTPATIENT)
Facility: REHABILITATION | Age: 63
End: 2023-02-28
Attending: PHYSICAL MEDICINE & REHABILITATION | Admitting: PHYSICAL MEDICINE & REHABILITATION
Payer: COMMERCIAL

## 2023-02-28 ENCOUNTER — APPOINTMENT (OUTPATIENT)
Dept: RADIOLOGY | Facility: REHABILITATION | Age: 63
End: 2023-02-28
Attending: PHYSICAL MEDICINE & REHABILITATION
Payer: COMMERCIAL

## 2023-02-28 VITALS
OXYGEN SATURATION: 94 % | HEART RATE: 84 BPM | SYSTOLIC BLOOD PRESSURE: 143 MMHG | DIASTOLIC BLOOD PRESSURE: 93 MMHG | BODY MASS INDEX: 35.15 KG/M2 | TEMPERATURE: 97.3 F | RESPIRATION RATE: 18 BRPM | WEIGHT: 251.1 LBS | HEIGHT: 71 IN

## 2023-02-28 PROCEDURE — 64483 NJX AA&/STRD TFRM EPI L/S 1: CPT

## 2023-02-28 PROCEDURE — 700111 HCHG RX REV CODE 636 W/ 250 OVERRIDE (IP)

## 2023-02-28 PROCEDURE — 700117 HCHG RX CONTRAST REV CODE 255

## 2023-02-28 PROCEDURE — 64484 NJX AA&/STRD TFRM EPI L/S EA: CPT

## 2023-02-28 RX ORDER — DEXAMETHASONE SODIUM PHOSPHATE 10 MG/ML
INJECTION, SOLUTION INTRAMUSCULAR; INTRAVENOUS
Status: COMPLETED
Start: 2023-02-28 | End: 2023-02-28

## 2023-02-28 RX ORDER — LIDOCAINE HYDROCHLORIDE 10 MG/ML
INJECTION, SOLUTION EPIDURAL; INFILTRATION; INTRACAUDAL; PERINEURAL
Status: COMPLETED
Start: 2023-02-28 | End: 2023-02-28

## 2023-02-28 RX ADMIN — DEXAMETHASONE SODIUM PHOSPHATE 10 MG: 10 INJECTION INTRAMUSCULAR; INTRAVENOUS at 08:11

## 2023-02-28 RX ADMIN — LIDOCAINE HYDROCHLORIDE 10 ML: 10 INJECTION, SOLUTION EPIDURAL; INFILTRATION; INTRACAUDAL; PERINEURAL at 08:12

## 2023-02-28 RX ADMIN — IOHEXOL 10 ML: 240 INJECTION, SOLUTION INTRATHECAL; INTRAVASCULAR; INTRAVENOUS; ORAL at 08:12

## 2023-02-28 ASSESSMENT — FIBROSIS 4 INDEX: FIB4 SCORE: 1.29

## 2023-02-28 ASSESSMENT — PAIN DESCRIPTION - PAIN TYPE: TYPE: CHRONIC PAIN

## 2023-02-28 NOTE — OP REPORT
Date of Service: 2/28/2023     Patient: Arias Nath 62 y.o. male     MRN: [id number]     Physician/s: Maximus Hernández MD    Pre-operative Diagnosis: Lumbar radiculopathy    Post-operative Diagnosis: Lumbar radiculopathy    Procedure: left Lumbar Transforaminal Epidural Steroid  at the L4-5 and L5-S1 levels.     Description of procedure:    The risks, benefits, and alternatives of the procedure were reviewed and discussed with the patient.  Written informed consent was freely obtained. A pre-procedural time-out was conducted by the physician verifying patient’s identity, procedure to be performed, procedure site and side, and allergy verification. Appropriate equipment was determined to be in place for the procedure.         The patient's vital signs were carefully monitored before, throughout, and after the procedure.     In the fluoroscopy suite the patient was placed in a prone position, a pillow placed underneath their umbilicus. The skin was prepped and draped in the usual sterile fashion.     The fluoroscope was placed over the lumbar spine and adjusted into the proper AP/Oblique view to enter the transforaminal space just adjacent to the pedicle at the levels below. The targets for injection were then marked at the left L4-5. A 27g 1.5 inch needle was placed into the marked site, and 1mL of 1% Lidocaine was injected subcutaneously into the epidermal and dermal layers. The needle was removed intact.  A 22g 5 inch spinal needle was then placed and advanced under fluoroscopic guidance in an oblique view towards the epidural space of the levels noted above. The needle position was confirmed to not be past the 6 o'clock position in the AP view and it was in the neuroforamen in the lateral view.        The fluoroscope was was then adjusted over the lumbar spine and adjusted into the proper AP/Oblique view to enter the transforaminal space adjacent to the pedicle at the LEFT  L5-S1. A 27g 1.5 inch needle was placed  into the marked site, and 1mL of 1% Lidocaine was injected subcutaneously into the epidermal and dermal layers. The needle was removed intact.  A 22g 5 inch spinal needle was then placed and advanced under fluoroscopic guidance in an oblique view towards the epidural space of the levels noted above. The needle position was confirmed to not be past the 6 o'clock position in the AP view and it was in the neuroforamen in the lateral view.       Under live fluoroscopic guidance in the AP view, contrast dye was used to highlight the epidural space spread of each level above. Final fluoroscopic images were saved.  Following negative aspiration, 1mL of 1% lidocaine preservative free with 5mg dexamethasone   was then injected at each level above, and the needles were removed intact after restyleted. The patient's back was covered with a 4x4 gauze, the area was cleansed with sterile normal saline, and a dressing was applied. There were no complications noted.     The patient was then evaluated post-procedure, and was hemodynamically stable prior to leaving the post-operative care unit.     This was a challenging procedure given the patients Body mass index is 35.02 kg/m².. This required longer needles.  A typical procedure such as this would require 25g 3.5 inch needles.  This procedure required 22g 5 inch needles.  This added to the mental effort for complexity this procedure.  This also made acquiring fluoroscopic images more time-consuming and challenging.  Final fluoroscopic images were obtained and saved.     Follow-up as scheduled    Maximus Hernández MD  Interventional Spine and Pain  Physical Medicine and Rehabilitation  [facility]          CPT codes  Transforaminal epidural injection- lumbar or sacral (first level):  20861-88  Transforaminal epidural injection- lumbar or sacral (each additional level):  07895-82

## 2023-02-28 NOTE — INTERVAL H&P NOTE
H&P reviewed. The patient was examined and there are no changes to the H&P     Lungs clear to auscultation bilaterally.  No abdominal tenderness.  Heart regular rate and rhythm.  Vitals reviewed.    Proceed with the originally scheduled procedure.  The risks, benefits and alternatives were discussed.  The patient understands these.    Pre-Op Diagnosis Codes:     * Lumbar radiculopathy [M54.16]  Procedure(s):  LEFT L4-5 and L5-S1 transforaminal epidural steroid injection with fluoroscopic guidance    Maximus Hernández MD  Physical Medicine and Rehabilitation  Interventional Spine and Sports Physiatry  [facility]

## 2023-02-28 NOTE — PROGRESS NOTES
0735 Pt arrived to pre-procedure area. Procedure & plan for recovery reviewed, site confirmed, & consent signed. Allergies & current medications verified. Appointed  waiting in car. Printed discharge instructions discussed & signed. Last dose Ibuprofen 2/27/23, OK per Dr. Hernández. MD to bedside prior to procedure.     0821 Pt to recovery area s/p FRANK & updates received from procedure RN. Pt reports relief in pain at this time. Ice pack given for home care. Pt tolerating PO fluids & snacks. Dr. Hernández to bedside for post-procedure evaluation.      0840 Pt ambulates without difficulty & meets criteria for DC. RN assisted pt off unit to appointed .

## 2023-03-27 ENCOUNTER — OFFICE VISIT (OUTPATIENT)
Dept: PHYSICAL MEDICINE AND REHAB | Facility: MEDICAL CENTER | Age: 63
End: 2023-03-27
Payer: COMMERCIAL

## 2023-03-27 VITALS
HEART RATE: 75 BPM | DIASTOLIC BLOOD PRESSURE: 76 MMHG | SYSTOLIC BLOOD PRESSURE: 122 MMHG | OXYGEN SATURATION: 98 % | HEIGHT: 71 IN | WEIGHT: 255.73 LBS | TEMPERATURE: 97.9 F | RESPIRATION RATE: 18 BRPM | BODY MASS INDEX: 35.8 KG/M2

## 2023-03-27 DIAGNOSIS — G89.29 CHRONIC LEFT-SIDED LOW BACK PAIN WITH LEFT-SIDED SCIATICA: ICD-10-CM

## 2023-03-27 DIAGNOSIS — M53.3 SI (SACROILIAC) JOINT DYSFUNCTION: ICD-10-CM

## 2023-03-27 DIAGNOSIS — M54.16 LUMBAR RADICULOPATHY: ICD-10-CM

## 2023-03-27 DIAGNOSIS — M47.816 LUMBAR SPONDYLOSIS: ICD-10-CM

## 2023-03-27 DIAGNOSIS — M54.42 CHRONIC LEFT-SIDED LOW BACK PAIN WITH LEFT-SIDED SCIATICA: ICD-10-CM

## 2023-03-27 DIAGNOSIS — Z71.82 EXERCISE COUNSELING: ICD-10-CM

## 2023-03-27 DIAGNOSIS — E66.9 OBESITY (BMI 30-39.9): ICD-10-CM

## 2023-03-27 PROCEDURE — 99214 OFFICE O/P EST MOD 30 MIN: CPT | Performed by: PHYSICAL MEDICINE & REHABILITATION

## 2023-03-27 ASSESSMENT — FIBROSIS 4 INDEX: FIB4 SCORE: 1.29

## 2023-03-27 ASSESSMENT — PAIN SCALES - GENERAL: PAINLEVEL: NO PAIN

## 2023-03-27 NOTE — Clinical Note
Skyler Landers is doing great after the epidural steroid injection.  He has minimal pain.  He is playing golf even 3 days in a row and skiing even 3 days in a row.  Overall he is very happy.  I will follow-up with him in about 1 year to check in.  Maximus

## 2023-03-27 NOTE — PROGRESS NOTES
Follow up patient note  Interventional spine and Pain  Physiatry (physical medicine and  Rehabilitation)       Chief complaint:   Chief Complaint   Patient presents with    Follow-Up     Lumbar radiculopathy          HISTORY    Please see new patient note by Dr Hernández,  for more details.     HPI  Patient identification: Arias Nath , THEO 1960,   With Diagnoses of Lumbar radiculopathy, Lumbar spondylosis, SI (sacroiliac) joint dysfunction, Chronic left-sided low back pain with left-sided sciatica, Obesity (BMI 30-39.9), and Exercise counseling were pertinent to this visit.     Procedures:  2023 left L4-5 and L5-S1 transforaminal epidural steroid injection Preprocedure pain 8/10 NRS postprocedure pain 1/10 NRS      The patient had excellent pain relief after the epidural steroid injection as above.  He has improved function with skiing, ADLs, golf.  Pain is minimal 1 out of 10 in intensity currently.  Aching quality and nonradiating.  He denies leg symptoms.  He denies numbness tingling or weakness.  -        ROS Red Flags :   Fever, Chills, Sweats: Denies  Involuntary Weight Loss: Denies  Bowel/Bladder Incontinence: Denies  Saddle Anesthesia: Denies        PMHx:   Past Medical History:   Diagnosis Date    Acute left-sided back pain with sciatica 2022    Cough 2018    Essential hypertension 2018    History of malignant melanoma of skin 2022    Found in 10/21 treated with excision by Dr. Mitchell.    Hypercholesterolemia 2019    Hypertension     Olecranon bursitis of left elbow 2018    Vitamin D deficiency 9/3/2019       PSHx:   Past Surgical History:   Procedure Laterality Date    LUMBAR TRANSFORAMINAL EPIDURAL STEROID INJECTION Left 2023    Procedure: LEFT L4-5 and L5-S1 transforaminal epidural steroid injection with fluoroscopic guidance;  Surgeon: Maximus Hernández M.D.;  [facility];  Service: Pain Management    KNEE ARTHROSCOPY Right  2005       Family history   Family History   Problem Relation Age of Onset    Colon Cancer Mother     Hypertension Mother     Hyperlipidemia Mother     No Known Problems Father          Medications:   Outpatient Medications Marked as Taking for the 3/27/23 encounter (Office Visit) with Maximus Hernández M.D.   Medication Sig Dispense Refill    ibuprofen (MOTRIN) 200 MG Tab Take 400 mg by mouth every 6 hours as needed.      amLODIPine (NORVASC) 5 MG Tab Take 1 Tablet by mouth every day. 90 Tablet 3    losartan (COZAAR) 100 MG Tab Take 1 Tablet by mouth every day. 90 Tablet 3    rosuvastatin (CRESTOR) 5 MG Tab Take 1 Tablet by mouth every evening. 90 Tablet 3    Cholecalciferol (VITAMIN D3) 125 MCG (5000 UT) Cap Take 1 Cap by mouth every day. 30 Cap         Current Outpatient Medications on File Prior to Visit   Medication Sig Dispense Refill    ibuprofen (MOTRIN) 200 MG Tab Take 400 mg by mouth every 6 hours as needed.      amLODIPine (NORVASC) 5 MG Tab Take 1 Tablet by mouth every day. 90 Tablet 3    losartan (COZAAR) 100 MG Tab Take 1 Tablet by mouth every day. 90 Tablet 3    rosuvastatin (CRESTOR) 5 MG Tab Take 1 Tablet by mouth every evening. 90 Tablet 3    Cholecalciferol (VITAMIN D3) 125 MCG (5000 UT) Cap Take 1 Cap by mouth every day. 30 Cap      No current facility-administered medications on file prior to visit.         Allergies:   Allergies   Allergen Reactions    Ace Inhibitors Cough       Social Hx:   Social History     Socioeconomic History    Marital status:      Spouse name: Not on file    Number of children: Not on file    Years of education: Not on file    Highest education level: Not on file   Occupational History    Not on file   Tobacco Use    Smoking status: Never    Smokeless tobacco: Never   Substance and Sexual Activity    Alcohol use: Yes     Alcohol/week: 0.6 oz     Types: 1 Cans of beer per week     Comment: social    Drug use: No    Sexual activity: Yes     Partners: Female      "Comment: , govt contractor owner   Other Topics Concern    Not on file   Social History Narrative    Not on file     Social Determinants of Health     Financial Resource Strain: Not on file   Food Insecurity: Not on file   Transportation Needs: Not on file   Physical Activity: Not on file   Stress: Not on file   Social Connections: Not on file   Intimate Partner Violence: Not on file   Housing Stability: Not on file         EXAMINATION     Physical Exam:   Vitals: /76 (BP Location: Right arm, Patient Position: Sitting, BP Cuff Size: Adult)   Pulse 75   Temp 36.6 °C (97.9 °F) (Temporal)   Resp 18   Ht 1.803 m (5' 11\")   Wt 116 kg (255 lb 11.7 oz)   SpO2 98%     Constitutional:   Body Habitus: Body mass index is 35.67 kg/m².  Cooperation: Fully cooperates with exam  Appearance: Well-groomed no disheveled    Respiratory-  breathing comfortable on room air, no audible wheezing  Cardiovascular- capillary refills less than 2 seconds. No lower extremity edema is noted.   Psychiatric- alert and oriented ×3. Normal affect.    MSK and Neuro: -    Thoracic/Lumbar Spine/Sacral Spine/Hips   There are no signs of infection around the injection sites.   full  active range of motion with flexion, lateral flexion, and rotation bilaterally.   There is full  active range of motion with lumbar extension.    There is no  pain with lumbar extension.   There is no pain with facet loading maneuver (extension rotation) with axial low back pain on the BILATERAL side(s)       Palpation:   No tenderness to palpation in midline at T1-T12 levels. No tenderness to palpation in the left and right of the midline T1-L5  palpation over SI joint: negative bilaterally    palpation in hip or over the gluteus medius tendon insertion: negative bilaterally      Lumbar spine Special tests  Neuro tension  Straight leg test negative bilaterally    Slump test negative bilaterally      Key points for the international standards for " neurological classification of spinal cord injury (ISNCSCI) to light touch.     Dermatome R L                                      L2 2 2   L3 2 2   L4 2 2   L5 2 2   S1 2 2   S2 2 2         Motor Exam Lower Extremities    ? Myotome R L   Hip flexion L2 5 5   Knee extension L3 5 5   Ankle dorsiflexion L4 5 5   Toe extension L5 5 5   Ankle plantarflexion S1 5 5           MEDICAL DECISION MAKING    DATA    Labs:   Lab Results   Component Value Date/Time    SODIUM 142 12/19/2022 08:40 AM    POTASSIUM 4.7 12/19/2022 08:40 AM    CHLORIDE 105 12/19/2022 08:40 AM    CO2 25 12/19/2022 08:40 AM    GLUCOSE 102 (H) 12/19/2022 08:40 AM    BUN 14 12/19/2022 08:40 AM    CREATININE 1.00 12/19/2022 08:40 AM        No results found for: PROTHROMBTM, INR     Lab Results   Component Value Date/Time    WBC 5.6 04/05/2022 07:49 AM    RBC 5.32 04/05/2022 07:49 AM    HEMOGLOBIN 15.3 04/05/2022 07:49 AM    HEMATOCRIT 47.9 04/05/2022 07:49 AM    MCV 90.0 04/05/2022 07:49 AM    MCH 28.8 04/05/2022 07:49 AM    MCHC 31.9 (L) 04/05/2022 07:49 AM    MPV 9.0 04/05/2022 07:49 AM    NEUTSPOLYS 73.00 (H) 09/03/2019 12:46 PM    LYMPHOCYTES 12.20 (L) 09/03/2019 12:46 PM    MONOCYTES 9.60 09/03/2019 12:46 PM    EOSINOPHILS 1.70 09/03/2019 12:46 PM    BASOPHILS 0.90 09/03/2019 12:46 PM    HYPOCHROMIA 1+ 03/25/2011 09:07 AM        Lab Results   Component Value Date/Time    HBA1C 6.2 (H) 12/19/2022 08:40 AM          Imaging:   I personally reviewed following images    I reviewed the fluoroscopic images from 2/28/2023 showing successful left L4-5 and L5-S1 transforaminal epidural steroid injection.  I reviewed these with the patient at the visit on 3/27/2023.        I reviewed the following radiology reports                         Results for orders placed in visit on 02/16/23    MR-LUMBAR SPINE-W/O    Impression  1.  Degenerative disease in the lumbar spine as described above.  2.  There are combinations of facet joint arthropathy, prominent ligamentum  flavum and small synovial cyst causing moderate effacement of the left lateral recess at the level of L4-5. The exiting left L5 nerve root might have been impinged at the lateral  recess.        Results for orders placed in visit on 23    MR-LUMBAR SPINE-W/O    Impression  1.  Degenerative disease in the lumbar spine as described above.  2.  There are combinations of facet joint arthropathy, prominent ligamentum flavum and small synovial cyst causing moderate effacement of the left lateral recess at the level of L4-5. The exiting left L5 nerve root might have been impinged at the lateral  recess.                                                                                                               Results for orders placed during the hospital encounter of 04    DX-KNEE COMPLETE 4+    Impression  IMPRESSION:      NEGATIVE RIGHT KNEE SERIES.              READING DR:        MIGDALIA COATS MD  READING DATE:      May 29 2004  6:29PM  REPORT STATUS:     FINAL REPORT    TRANSCRIPTION CODE:         [facility]  TRANSCRIPTION DATE:         May 29 2004  9:48PM    THIS DOCUMENT HAS BEEN ELECTRONICALLY SIGNED BY:  MAGAN RIOS MD,  PhD - May 30 2004  7:03AM    Results for orders placed in visit on 22    DX-LUMBAR SPINE-4+ VIEWS                        DIAGNOSIS   Visit Diagnoses     ICD-10-CM   1. Lumbar radiculopathy  M54.16   2. Lumbar spondylosis  M47.816   3. SI (sacroiliac) joint dysfunction  M53.3   4. Chronic left-sided low back pain with left-sided sciatica  M54.42    G89.29   5. Obesity (BMI 30-39.9)  E66.9   6. Exercise counseling  Z71.82         ASSESSMENT and PLAN:     Arias Pham  1960 male      Arias was seen today for follow-up.    Diagnoses and all orders for this visit:    Lumbar radiculopathy  -     Referral to Physical Therapy    Lumbar spondylosis  -     Referral to Physical Therapy    SI (sacroiliac) joint dysfunction  -     Referral to Physical Therapy    Chronic  left-sided low back pain with left-sided sciatica  -     Referral to Physical Therapy    Obesity (BMI 30-39.9)    Exercise counseling        The patient had excellent pain relief and functional improvement.  He was now able to play golf on 3 days in a row and to ski 3 days in a row and do all of his exercises.  Pain is minimal.  He is very Happy with this.    During flares of pain he can use ibuprofen up to 600 mg 3 times daily as needed pain with food for up to 2 weeks at a time.    Follow up: 1 year or sooner as needed    Thank you for allowing me to participate in the care of this patient. If you have any questions please not hesitate to contact me.             Please note that this dictation was created using voice recognition software. I have made every reasonable attempt to correct obvious errors but there may be errors of grammar and content that I may have overlooked prior to finalization of this note.      Maximus Hernández MD  Interventional Spine and Sports Physiatry  Physical Medicine and Rehabilitation  [facility]

## 2023-03-28 ENCOUNTER — HOSPITAL ENCOUNTER (OUTPATIENT)
Dept: LAB | Facility: MEDICAL CENTER | Age: 63
End: 2023-03-28
Attending: FAMILY MEDICINE
Payer: COMMERCIAL

## 2023-03-28 DIAGNOSIS — E78.00 HYPERCHOLESTEROLEMIA: ICD-10-CM

## 2023-03-28 DIAGNOSIS — R73.03 PREDIABETES: ICD-10-CM

## 2023-03-28 LAB
ALBUMIN SERPL BCP-MCNC: 4.3 G/DL (ref 3.2–4.9)
ALBUMIN/GLOB SERPL: 1.5 G/DL
ALP SERPL-CCNC: 96 U/L (ref 30–99)
ALT SERPL-CCNC: 30 U/L (ref 2–50)
ANION GAP SERPL CALC-SCNC: 10 MMOL/L (ref 7–16)
AST SERPL-CCNC: 14 U/L (ref 12–45)
BILIRUB SERPL-MCNC: 0.5 MG/DL (ref 0.1–1.5)
BUN SERPL-MCNC: 17 MG/DL (ref 8–22)
CALCIUM ALBUM COR SERPL-MCNC: 8.9 MG/DL (ref 8.5–10.5)
CALCIUM SERPL-MCNC: 9.1 MG/DL (ref 8.5–10.5)
CHLORIDE SERPL-SCNC: 104 MMOL/L (ref 96–112)
CHOLEST SERPL-MCNC: 136 MG/DL (ref 100–199)
CO2 SERPL-SCNC: 25 MMOL/L (ref 20–33)
CREAT SERPL-MCNC: 0.93 MG/DL (ref 0.5–1.4)
EST. AVERAGE GLUCOSE BLD GHB EST-MCNC: 126 MG/DL
FASTING STATUS PATIENT QL REPORTED: NORMAL
GFR SERPLBLD CREATININE-BSD FMLA CKD-EPI: 94 ML/MIN/1.73 M 2
GLOBULIN SER CALC-MCNC: 2.8 G/DL (ref 1.9–3.5)
GLUCOSE SERPL-MCNC: 101 MG/DL (ref 65–99)
HBA1C MFR BLD: 6 % (ref 4–5.6)
HDLC SERPL-MCNC: 47 MG/DL
LDLC SERPL CALC-MCNC: 72 MG/DL
POTASSIUM SERPL-SCNC: 4.4 MMOL/L (ref 3.6–5.5)
PROT SERPL-MCNC: 7.1 G/DL (ref 6–8.2)
SODIUM SERPL-SCNC: 139 MMOL/L (ref 135–145)
TRIGL SERPL-MCNC: 86 MG/DL (ref 0–149)

## 2023-03-28 PROCEDURE — 83036 HEMOGLOBIN GLYCOSYLATED A1C: CPT

## 2023-03-28 PROCEDURE — 80061 LIPID PANEL: CPT

## 2023-03-28 PROCEDURE — 36415 COLL VENOUS BLD VENIPUNCTURE: CPT

## 2023-03-28 PROCEDURE — 80053 COMPREHEN METABOLIC PANEL: CPT

## 2023-03-31 ENCOUNTER — OFFICE VISIT (OUTPATIENT)
Dept: INTERNAL MEDICINE | Facility: IMAGING CENTER | Age: 63
End: 2023-03-31
Payer: COMMERCIAL

## 2023-03-31 VITALS
TEMPERATURE: 97.6 F | DIASTOLIC BLOOD PRESSURE: 82 MMHG | BODY MASS INDEX: 35.28 KG/M2 | RESPIRATION RATE: 16 BRPM | WEIGHT: 252 LBS | OXYGEN SATURATION: 95 % | SYSTOLIC BLOOD PRESSURE: 134 MMHG | HEIGHT: 71 IN | HEART RATE: 86 BPM

## 2023-03-31 DIAGNOSIS — E78.00 HYPERCHOLESTEROLEMIA: ICD-10-CM

## 2023-03-31 DIAGNOSIS — I10 ESSENTIAL HYPERTENSION: ICD-10-CM

## 2023-03-31 DIAGNOSIS — R73.03 PREDIABETES: ICD-10-CM

## 2023-03-31 DIAGNOSIS — E66.01 MORBID (SEVERE) OBESITY DUE TO EXCESS CALORIES (HCC): ICD-10-CM

## 2023-03-31 PROCEDURE — 99214 OFFICE O/P EST MOD 30 MIN: CPT | Performed by: FAMILY MEDICINE

## 2023-03-31 ASSESSMENT — FIBROSIS 4 INDEX: FIB4 SCORE: 0.74

## 2023-03-31 NOTE — PROGRESS NOTES
HCC Gap Form    Diagnosis: E66.01 - Morbid (severe) obesity due to excess calories ([facility])  Z68.35 - Body mass index (BMI) 35.0-35.9, adult  Comorbidity Diagnosis: Essential hypertension  The current BMI is 35.15 kg/m2 as of 03/31/23 08:33 PDT  Assessment and plan: Chronic, improving. Encouraged healthy diet and physical activity changes with a goal of weight loss. Follow up at least annually. The comorbid condition is controlled based on today's assessment. Continue current treatment plan including control of risk factors. Follow up in 6 months.    Last edited 03/31/23 08:33 PDT by Leesa Brewer M.D.             CC: Diagnoses of Prediabetes, Hypercholesterolemia, and Essential hypertension were pertinent to this visit.    Subjective                                                                                                                                       HPI:   Arias presents today with the following concerns:    1. Prediabetes  Chronic problem well-controlled with lifestyle management.  Patient is continuing to work on weight loss.    2. Hypercholesterolemia  Problem well-controlled with medication    3. Essential hypertension  Problem well-controlled with medication       Patient Active Problem List    Diagnosis Date Noted    Acute bilateral low back pain without sciatica 11/04/2022    Acute left-sided back pain with sciatica 11/04/2022    Prediabetes 07/08/2022    History of malignant melanoma of skin 01/07/2022    Hypercholesterolemia 11/06/2019    Vitamin D deficiency 09/03/2019    Wellness examination 08/02/2019    Olecranon bursitis of left elbow 06/27/2018    Essential hypertension 05/11/2018    Obesity (BMI 35.0-39.9 without comorbidity) ([facility]) 05/11/2018       Current Outpatient Medications   Medication Sig Dispense Refill    ibuprofen (MOTRIN) 200 MG Tab Take 400 mg by mouth every 6 hours as needed.      amLODIPine (NORVASC) 5 MG Tab Take 1 Tablet by mouth every day. 90 Tablet 3    losartan  "(COZAAR) 100 MG Tab Take 1 Tablet by mouth every day. 90 Tablet 3    rosuvastatin (CRESTOR) 5 MG Tab Take 1 Tablet by mouth every evening. 90 Tablet 3    Cholecalciferol (VITAMIN D3) 125 MCG (5000 UT) Cap Take 1 Cap by mouth every day. 30 Cap      No current facility-administered medications for this visit.         Allergies as of 03/31/2023 - Reviewed 03/31/2023   Allergen Reaction Noted    Ace inhibitors Cough 08/02/2019          Objective      /82 (BP Location: Right arm, Patient Position: Sitting, BP Cuff Size: Adult)   Pulse 86   Temp 36.4 °C (97.6 °F) (Temporal)   Resp 16   Ht 1.803 m (5' 11\")   Wt 114 kg (252 lb)   SpO2 95%   BMI 35.15 kg/m²     Physical Exam:  Gen:         Alert and oriented, No apparent distress.  Neck:        No Lymphadenopathy or Bruits.  Lungs:     Clear to auscultation bilaterally  CV:          Regular rate and rhythm. No murmurs, rubs or gallops.               Ext:          No clubbing, cyanosis, edema.    LABS: 3/28/2023: Results reviewed and discussed with the patient, questions answered.      Assessment & Plan    62 y.o. male with the following issues:    Prediabetes  This is a chronic health problem for this patient that he continues to be careful about dietary choices.  He is done a great job.  His fasting glucose is 101 and his A1c is down to 6.0.  We will have him continue with current lifestyle management and plan to recheck him in 6 months.  I have cautioned him to continue to be careful about his food choices.    Hypercholesterolemia  This is a chronic health problem well-controlled on rosuvastatin 5 mg daily.  His total cholesterol is down to 136, triglycerides are excellent at 86, HDL good at 47 LDL good at 72.  There is no liver dysfunction.  He will continue with current meds.    Essential hypertension  This is a chronic health problem for which the patient is on losartan 100 mg daily.  His blood pressure is excellent 134/82.  He is interested in trying to " get off of meds.  We are going to set a goal to get him down to 240 and then try decreasing his meds and hopefully working on weight off.  He is also on amlodipine 5 mg daily.  When we start to wean him off we will start with the amlodipine first.

## 2023-03-31 NOTE — ASSESSMENT & PLAN NOTE
This is a chronic health problem for which the patient is on losartan 100 mg daily.  His blood pressure is excellent 134/82.  He is interested in trying to get off of meds.  We are going to set a goal to get him down to 240 and then try decreasing his meds and hopefully working on weight off.  He is also on amlodipine 5 mg daily.  When we start to wean him off we will start with the amlodipine first.

## 2023-03-31 NOTE — ASSESSMENT & PLAN NOTE
This is a chronic health problem well-controlled on rosuvastatin 5 mg daily.  His total cholesterol is down to 136, triglycerides are excellent at 86, HDL good at 47 LDL good at 72.  There is no liver dysfunction.  He will continue with current meds.

## 2023-03-31 NOTE — ASSESSMENT & PLAN NOTE
This is a chronic health problem for this patient that he continues to be careful about dietary choices.  He is done a great job.  His fasting glucose is 101 and his A1c is down to 6.0.  We will have him continue with current lifestyle management and plan to recheck him in 6 months.  I have cautioned him to continue to be careful about his food choices.

## 2023-04-11 RX ORDER — LOSARTAN POTASSIUM 100 MG/1
TABLET ORAL
Qty: 90 TABLET | Refills: 3 | Status: SHIPPED | OUTPATIENT
Start: 2023-04-11

## 2023-04-13 ENCOUNTER — OFFICE VISIT (OUTPATIENT)
Dept: PHYSICAL MEDICINE AND REHAB | Facility: MEDICAL CENTER | Age: 63
End: 2023-04-13
Payer: COMMERCIAL

## 2023-04-13 VITALS
WEIGHT: 258.6 LBS | DIASTOLIC BLOOD PRESSURE: 68 MMHG | HEIGHT: 71 IN | TEMPERATURE: 97.8 F | SYSTOLIC BLOOD PRESSURE: 122 MMHG | HEART RATE: 83 BPM | BODY MASS INDEX: 36.2 KG/M2 | OXYGEN SATURATION: 95 %

## 2023-04-13 DIAGNOSIS — G89.29 CHRONIC LEFT-SIDED LOW BACK PAIN WITH LEFT-SIDED SCIATICA: ICD-10-CM

## 2023-04-13 DIAGNOSIS — M53.3 SI (SACROILIAC) JOINT DYSFUNCTION: ICD-10-CM

## 2023-04-13 DIAGNOSIS — M54.42 CHRONIC LEFT-SIDED LOW BACK PAIN WITH LEFT-SIDED SCIATICA: ICD-10-CM

## 2023-04-13 DIAGNOSIS — M54.16 LUMBAR RADICULOPATHY: ICD-10-CM

## 2023-04-13 DIAGNOSIS — M48.10 DISH (DIFFUSE IDIOPATHIC SKELETAL HYPEROSTOSIS): ICD-10-CM

## 2023-04-13 DIAGNOSIS — M47.816 LUMBAR SPONDYLOSIS: ICD-10-CM

## 2023-04-13 PROCEDURE — 99214 OFFICE O/P EST MOD 30 MIN: CPT | Performed by: PHYSICAL MEDICINE & REHABILITATION

## 2023-04-13 RX ORDER — MELOXICAM 15 MG/1
15 TABLET ORAL
Qty: 60 TABLET | Refills: 0 | Status: SHIPPED
Start: 2023-04-13 | End: 2023-05-18

## 2023-04-13 RX ORDER — TIZANIDINE 4 MG/1
4 TABLET ORAL NIGHTLY PRN
Qty: 30 TABLET | Refills: 2 | Status: SHIPPED
Start: 2023-04-13 | End: 2023-05-18

## 2023-04-13 ASSESSMENT — PATIENT HEALTH QUESTIONNAIRE - PHQ9: CLINICAL INTERPRETATION OF PHQ2 SCORE: 0

## 2023-04-13 ASSESSMENT — FIBROSIS 4 INDEX: FIB4 SCORE: 0.74

## 2023-04-13 NOTE — PROGRESS NOTES
Follow up patient note  Interventional spine and Pain  Physiatry (physical medicine and  Rehabilitation)       Chief complaint:   Chief Complaint   Patient presents with    Follow-Up     Lumbar radiculopathy          HISTORY    Please see new patient note by Dr Hernández,  for more details.     HPI  Patient identification: Arias Nath , THEO 1960,   With Diagnoses of Lumbar spondylosis, Lumbar radiculopathy, SI (sacroiliac) joint dysfunction, Chronic left-sided low back pain with left-sided sciatica, and DISH (diffuse idiopathic skeletal hyperostosis) were pertinent to this visit.     Procedures:  2023 left L4-5 and L5-S1 transforaminal epidural steroid injection Preprocedure pain 8/10 NRS postprocedure pain 1/10 NRS    The patient continues have excellent pain relief on the left side in his low back.  He does have pain which is on his right side of his lumbar region, worse with lumbar extension, 8 out of 10 intensity.  This has been an intermittent waxing and waning pain present for years.  Currently in a flare.    He has been to physical therapy in the past,    He has tried medication management including with NSAIDs and acetaminophen         ROS Red Flags :   Fever, Chills, Sweats: Denies  Involuntary Weight Loss: Denies  Bowel/Bladder Incontinence: Denies  Saddle Anesthesia: Denies        PMHx:   Past Medical History:   Diagnosis Date    Acute left-sided back pain with sciatica 2022    Cough 2018    Essential hypertension 2018    History of malignant melanoma of skin 2022    Found in 10/21 treated with excision by Dr. Mitchell.    Hypercholesterolemia 2019    Hypertension     Olecranon bursitis of left elbow 2018    Vitamin D deficiency 9/3/2019       PSHx:   Past Surgical History:   Procedure Laterality Date    LUMBAR TRANSFORAMINAL EPIDURAL STEROID INJECTION Left 2023    Procedure: LEFT L4-5 and L5-S1 transforaminal epidural steroid injection with fluoroscopic  guidance;  Surgeon: Maximus Hernández M.D.;  [facility];  Service: Pain Management    KNEE ARTHROSCOPY Right 2005       Family history   Family History   Problem Relation Age of Onset    Colon Cancer Mother     Hypertension Mother     Hyperlipidemia Mother     No Known Problems Father          Medications:   Outpatient Medications Marked as Taking for the 4/13/23 encounter (Office Visit) with Maximus Hernández M.D.   Medication Sig Dispense Refill    meloxicam (MOBIC) 15 MG tablet Take 1 Tablet by mouth 1 time a day as needed (pain). Do not take other NSAIDs. No refills. 60 Tablet 0    tizanidine (ZANAFLEX) 4 MG Tab Take 1 Tablet by mouth at bedtime as needed (muscle spasms). 30 Tablet 2    losartan (COZAAR) 100 MG Tab TAKE ONE TABLET BY MOUTH EVERY DAY 90 Tablet 3    amLODIPine (NORVASC) 5 MG Tab Take 1 Tablet by mouth every day. 90 Tablet 3    rosuvastatin (CRESTOR) 5 MG Tab Take 1 Tablet by mouth every evening. 90 Tablet 3    Cholecalciferol (VITAMIN D3) 125 MCG (5000 UT) Cap Take 1 Cap by mouth every day. 30 Cap         Current Outpatient Medications on File Prior to Visit   Medication Sig Dispense Refill    losartan (COZAAR) 100 MG Tab TAKE ONE TABLET BY MOUTH EVERY DAY 90 Tablet 3    amLODIPine (NORVASC) 5 MG Tab Take 1 Tablet by mouth every day. 90 Tablet 3    rosuvastatin (CRESTOR) 5 MG Tab Take 1 Tablet by mouth every evening. 90 Tablet 3    Cholecalciferol (VITAMIN D3) 125 MCG (5000 UT) Cap Take 1 Cap by mouth every day. 30 Cap      No current facility-administered medications on file prior to visit.         Allergies:   Allergies   Allergen Reactions    Ace Inhibitors Cough       Social Hx:   Social History     Socioeconomic History    Marital status:      Spouse name: Not on file    Number of children: Not on file    Years of education: Not on file    Highest education level: Not on file   Occupational History    Not on file   Tobacco Use    Smoking status: Never     "Smokeless tobacco: Never   Substance and Sexual Activity    Alcohol use: Yes     Alcohol/week: 0.6 oz     Types: 1 Cans of beer per week     Comment: social    Drug use: No    Sexual activity: Yes     Partners: Female     Comment: , govt contractor owner   Other Topics Concern    Not on file   Social History Narrative    Not on file     Social Determinants of Health     Financial Resource Strain: Not on file   Food Insecurity: Not on file   Transportation Needs: Not on file   Physical Activity: Not on file   Stress: Not on file   Social Connections: Not on file   Intimate Partner Violence: Not on file   Housing Stability: Not on file         EXAMINATION     Physical Exam:   Vitals: /68   Pulse 83   Temp 36.6 °C (97.8 °F)   Ht 1.803 m (5' 11\")   Wt 117 kg (258 lb 9.6 oz)   SpO2 95%     Constitutional:   Body Habitus: Body mass index is 36.07 kg/m².  Cooperation: Fully cooperates with exam  Appearance: Well-groomed no disheveled    Respiratory-  breathing comfortable on room air, no audible wheezing  Cardiovascular- capillary refills less than 2 seconds. No lower extremity edema is noted.   Psychiatric- alert and oriented ×3. Normal affect.    MSK and Neuro: -  Strength is 5 out of 5 throughout the bilateral lower extremities.  No tenderness palpation throughout the lumbar spine.  Sensation is intact in the bilateral lower extremities.    Extension rotation maneuver is positive on the right at L4-5 and L5-S1, negative on the left.  Straight leg raise test and slump test are negative bilaterally.    MEDICAL DECISION MAKING    DATA    Labs:   Lab Results   Component Value Date/Time    SODIUM 139 03/28/2023 08:14 AM    POTASSIUM 4.4 03/28/2023 08:14 AM    CHLORIDE 104 03/28/2023 08:14 AM    CO2 25 03/28/2023 08:14 AM    GLUCOSE 101 (H) 03/28/2023 08:14 AM    BUN 17 03/28/2023 08:14 AM    CREATININE 0.93 03/28/2023 08:14 AM        No results found for: PROTHROMBTM, INR     Lab Results   Component Value " Date/Time    WBC 5.6 04/05/2022 07:49 AM    RBC 5.32 04/05/2022 07:49 AM    HEMOGLOBIN 15.3 04/05/2022 07:49 AM    HEMATOCRIT 47.9 04/05/2022 07:49 AM    MCV 90.0 04/05/2022 07:49 AM    MCH 28.8 04/05/2022 07:49 AM    MCHC 31.9 (L) 04/05/2022 07:49 AM    MPV 9.0 04/05/2022 07:49 AM    NEUTSPOLYS 73.00 (H) 09/03/2019 12:46 PM    LYMPHOCYTES 12.20 (L) 09/03/2019 12:46 PM    MONOCYTES 9.60 09/03/2019 12:46 PM    EOSINOPHILS 1.70 09/03/2019 12:46 PM    BASOPHILS 0.90 09/03/2019 12:46 PM    HYPOCHROMIA 1+ 03/25/2011 09:07 AM        Lab Results   Component Value Date/Time    HBA1C 6.0 (H) 03/28/2023 08:14 AM          Imaging:   I personally reviewed following images    I reviewed the fluoroscopic images from 2/28/2023 showing successful left L4-5 and L5-S1 transforaminal epidural steroid injection.  I reviewed these with the patient at the visit on 3/27/2023.        I reviewed the following radiology reports                         Results for orders placed in visit on 02/16/23    MR-LUMBAR SPINE-W/O    Impression  1.  Degenerative disease in the lumbar spine as described above.  2.  There are combinations of facet joint arthropathy, prominent ligamentum flavum and small synovial cyst causing moderate effacement of the left lateral recess at the level of L4-5. The exiting left L5 nerve root might have been impinged at the lateral  recess.        Results for orders placed in visit on 02/16/23    MR-LUMBAR SPINE-W/O    Impression  1.  Degenerative disease in the lumbar spine as described above.  2.  There are combinations of facet joint arthropathy, prominent ligamentum flavum and small synovial cyst causing moderate effacement of the left lateral recess at the level of L4-5. The exiting left L5 nerve root might have been impinged at the lateral  recess.                                                                                                               Results for orders placed during the hospital encounter of  04    DX-KNEE COMPLETE 4+    Impression  IMPRESSION:      NEGATIVE RIGHT KNEE SERIES.              READING DR:        MIGDALIA COATS MD  READING DATE:      May 29 2004  6:29PM  REPORT STATUS:     FINAL REPORT    TRANSCRIPTION CODE:         MOC  TRANSCRIPTION DATE:         May 29 2004  9:48PM    THIS DOCUMENT HAS BEEN ELECTRONICALLY SIGNED BY:  MAGAN RIOS MD,  PhD - May 30 2004  7:03AM    Results for orders placed in visit on 22    DX-LUMBAR SPINE-4+ VIEWS                        DIAGNOSIS   Visit Diagnoses     ICD-10-CM   1. Lumbar spondylosis  M47.816   2. Lumbar radiculopathy  M54.16   3. SI (sacroiliac) joint dysfunction  M53.3   4. Chronic left-sided low back pain with left-sided sciatica  M54.42    G89.29   5. DISH (diffuse idiopathic skeletal hyperostosis)  M48.10         ASSESSMENT and PLAN:     Arias Lindaignrasheed  1960 male      Arias was seen today for follow-up.    Diagnoses and all orders for this visit:    Lumbar spondylosis  -     meloxicam (MOBIC) 15 MG tablet; Take 1 Tablet by mouth 1 time a day as needed (pain). Do not take other NSAIDs. No refills.  -     tizanidine (ZANAFLEX) 4 MG Tab; Take 1 Tablet by mouth at bedtime as needed (muscle spasms).    Lumbar radiculopathy  -     meloxicam (MOBIC) 15 MG tablet; Take 1 Tablet by mouth 1 time a day as needed (pain). Do not take other NSAIDs. No refills.  -     tizanidine (ZANAFLEX) 4 MG Tab; Take 1 Tablet by mouth at bedtime as needed (muscle spasms).    SI (sacroiliac) joint dysfunction  -     meloxicam (MOBIC) 15 MG tablet; Take 1 Tablet by mouth 1 time a day as needed (pain). Do not take other NSAIDs. No refills.  -     tizanidine (ZANAFLEX) 4 MG Tab; Take 1 Tablet by mouth at bedtime as needed (muscle spasms).    Chronic left-sided low back pain with left-sided sciatica  -     meloxicam (MOBIC) 15 MG tablet; Take 1 Tablet by mouth 1 time a day as needed (pain). Do not take other NSAIDs. No refills.  -     tizanidine  (ZANAFLEX) 4 MG Tab; Take 1 Tablet by mouth at bedtime as needed (muscle spasms).    DISH (diffuse idiopathic skeletal hyperostosis)      No signs of lumbar radiculopathy at this point.  I believe the majority of his pain is secondary to facet mediated pain on the right side.  We discussed the medication management as above as well as restarting physical therapy and the patient states he does have appointments.  We discussed his home exercise program.    If there is no improvement with conservative treatments I would consider the patient for diagnostic medial branch blocks on the right at L4-5 and L5-S1    Follow up: 1 month    Thank you for allowing me to participate in the care of this patient. If you have any questions please not hesitate to contact me.             Please note that this dictation was created using voice recognition software. I have made every reasonable attempt to correct obvious errors but there may be errors of grammar and content that I may have overlooked prior to finalization of this note.      Maximus Hernández MD  Interventional Spine and Sports Physiatry  Physical Medicine and Rehabilitation  [facility]

## 2023-05-18 ENCOUNTER — APPOINTMENT (OUTPATIENT)
Dept: RADIOLOGY | Facility: MEDICAL CENTER | Age: 63
End: 2023-05-18
Attending: EMERGENCY MEDICINE
Payer: COMMERCIAL

## 2023-05-18 ENCOUNTER — OFFICE VISIT (OUTPATIENT)
Dept: PHYSICAL MEDICINE AND REHAB | Facility: MEDICAL CENTER | Age: 63
End: 2023-05-18
Payer: COMMERCIAL

## 2023-05-18 ENCOUNTER — HOSPITAL ENCOUNTER (EMERGENCY)
Facility: MEDICAL CENTER | Age: 63
End: 2023-05-18
Attending: EMERGENCY MEDICINE
Payer: COMMERCIAL

## 2023-05-18 VITALS
HEIGHT: 71 IN | BODY MASS INDEX: 36.33 KG/M2 | TEMPERATURE: 97.3 F | HEART RATE: 84 BPM | OXYGEN SATURATION: 96 % | SYSTOLIC BLOOD PRESSURE: 136 MMHG | DIASTOLIC BLOOD PRESSURE: 78 MMHG | WEIGHT: 259.48 LBS

## 2023-05-18 VITALS
RESPIRATION RATE: 16 BRPM | BODY MASS INDEX: 36.11 KG/M2 | HEIGHT: 71 IN | OXYGEN SATURATION: 97 % | SYSTOLIC BLOOD PRESSURE: 158 MMHG | TEMPERATURE: 98.1 F | HEART RATE: 68 BPM | WEIGHT: 257.94 LBS | DIASTOLIC BLOOD PRESSURE: 85 MMHG

## 2023-05-18 DIAGNOSIS — M53.3 SI (SACROILIAC) JOINT DYSFUNCTION: ICD-10-CM

## 2023-05-18 DIAGNOSIS — G89.29 CHRONIC LEFT-SIDED LOW BACK PAIN WITH LEFT-SIDED SCIATICA: ICD-10-CM

## 2023-05-18 DIAGNOSIS — M48.10 DISH (DIFFUSE IDIOPATHIC SKELETAL HYPEROSTOSIS): ICD-10-CM

## 2023-05-18 DIAGNOSIS — M54.16 LUMBAR RADICULOPATHY: ICD-10-CM

## 2023-05-18 DIAGNOSIS — M54.42 CHRONIC LEFT-SIDED LOW BACK PAIN WITH LEFT-SIDED SCIATICA: ICD-10-CM

## 2023-05-18 DIAGNOSIS — K29.80 DUODENITIS: ICD-10-CM

## 2023-05-18 DIAGNOSIS — M47.816 LUMBAR SPONDYLOSIS: ICD-10-CM

## 2023-05-18 DIAGNOSIS — K27.9 PUD (PEPTIC ULCER DISEASE): ICD-10-CM

## 2023-05-18 DIAGNOSIS — E66.9 OBESITY (BMI 30-39.9): ICD-10-CM

## 2023-05-18 DIAGNOSIS — Z71.82 EXERCISE COUNSELING: ICD-10-CM

## 2023-05-18 LAB
ALBUMIN SERPL BCP-MCNC: 4.4 G/DL (ref 3.2–4.9)
ALBUMIN/GLOB SERPL: 1.3 G/DL
ALP SERPL-CCNC: 110 U/L (ref 30–99)
ALT SERPL-CCNC: 23 U/L (ref 2–50)
ANION GAP SERPL CALC-SCNC: 11 MMOL/L (ref 7–16)
APPEARANCE UR: CLEAR
AST SERPL-CCNC: 19 U/L (ref 12–45)
BASOPHILS # BLD AUTO: 0.3 % (ref 0–1.8)
BASOPHILS # BLD: 0.03 K/UL (ref 0–0.12)
BILIRUB SERPL-MCNC: 0.6 MG/DL (ref 0.1–1.5)
BILIRUB UR QL STRIP.AUTO: NEGATIVE
BUN SERPL-MCNC: 16 MG/DL (ref 8–22)
CALCIUM ALBUM COR SERPL-MCNC: 9.6 MG/DL (ref 8.5–10.5)
CALCIUM SERPL-MCNC: 9.9 MG/DL (ref 8.4–10.2)
CHLORIDE SERPL-SCNC: 101 MMOL/L (ref 96–112)
CO2 SERPL-SCNC: 24 MMOL/L (ref 20–33)
COLOR UR: YELLOW
CREAT SERPL-MCNC: 0.9 MG/DL (ref 0.5–1.4)
EOSINOPHIL # BLD AUTO: 0.24 K/UL (ref 0–0.51)
EOSINOPHIL NFR BLD: 2.7 % (ref 0–6.9)
ERYTHROCYTE [DISTWIDTH] IN BLOOD BY AUTOMATED COUNT: 41 FL (ref 35.9–50)
GFR SERPLBLD CREATININE-BSD FMLA CKD-EPI: 97 ML/MIN/1.73 M 2
GLOBULIN SER CALC-MCNC: 3.4 G/DL (ref 1.9–3.5)
GLUCOSE SERPL-MCNC: 137 MG/DL (ref 65–99)
GLUCOSE UR STRIP.AUTO-MCNC: NEGATIVE MG/DL
HCT VFR BLD AUTO: 45 % (ref 42–52)
HGB BLD-MCNC: 15.1 G/DL (ref 14–18)
IMM GRANULOCYTES # BLD AUTO: 0.02 K/UL (ref 0–0.11)
IMM GRANULOCYTES NFR BLD AUTO: 0.2 % (ref 0–0.9)
KETONES UR STRIP.AUTO-MCNC: 15 MG/DL
LEUKOCYTE ESTERASE UR QL STRIP.AUTO: NEGATIVE
LIPASE SERPL-CCNC: 17 U/L (ref 7–58)
LYMPHOCYTES # BLD AUTO: 1.63 K/UL (ref 1–4.8)
LYMPHOCYTES NFR BLD: 18.5 % (ref 22–41)
MCH RBC QN AUTO: 29 PG (ref 27–33)
MCHC RBC AUTO-ENTMCNC: 33.6 G/DL (ref 33.7–35.3)
MCV RBC AUTO: 86.4 FL (ref 81.4–97.8)
MICRO URNS: ABNORMAL
MONOCYTES # BLD AUTO: 0.75 K/UL (ref 0–0.85)
MONOCYTES NFR BLD AUTO: 8.5 % (ref 0–13.4)
MUCOUS THREADS #/AREA URNS HPF: ABNORMAL /HPF
NEUTROPHILS # BLD AUTO: 6.12 K/UL (ref 1.82–7.42)
NEUTROPHILS NFR BLD: 69.8 % (ref 44–72)
NITRITE UR QL STRIP.AUTO: NEGATIVE
NRBC # BLD AUTO: 0 K/UL
NRBC BLD-RTO: 0 /100 WBC
PH UR STRIP.AUTO: 5 [PH] (ref 5–8)
PLATELET # BLD AUTO: 222 K/UL (ref 164–446)
PMV BLD AUTO: 8 FL (ref 9–12.9)
POTASSIUM SERPL-SCNC: 4 MMOL/L (ref 3.6–5.5)
PROT SERPL-MCNC: 7.8 G/DL (ref 6–8.2)
PROT UR QL STRIP: NEGATIVE MG/DL
RBC # BLD AUTO: 5.21 M/UL (ref 4.7–6.1)
RBC # URNS HPF: ABNORMAL /HPF
RBC UR QL AUTO: ABNORMAL
SODIUM SERPL-SCNC: 136 MMOL/L (ref 135–145)
SP GR UR STRIP.AUTO: 1.01
WBC # BLD AUTO: 8.8 K/UL (ref 4.8–10.8)
WBC #/AREA URNS HPF: ABNORMAL /HPF

## 2023-05-18 PROCEDURE — C9113 INJ PANTOPRAZOLE SODIUM, VIA: HCPCS | Performed by: EMERGENCY MEDICINE

## 2023-05-18 PROCEDURE — 81001 URINALYSIS AUTO W/SCOPE: CPT

## 2023-05-18 PROCEDURE — 700111 HCHG RX REV CODE 636 W/ 250 OVERRIDE (IP): Performed by: EMERGENCY MEDICINE

## 2023-05-18 PROCEDURE — 3078F DIAST BP <80 MM HG: CPT | Performed by: PHYSICAL MEDICINE & REHABILITATION

## 2023-05-18 PROCEDURE — 99285 EMERGENCY DEPT VISIT HI MDM: CPT

## 2023-05-18 PROCEDURE — 1125F AMNT PAIN NOTED PAIN PRSNT: CPT | Performed by: PHYSICAL MEDICINE & REHABILITATION

## 2023-05-18 PROCEDURE — 36415 COLL VENOUS BLD VENIPUNCTURE: CPT

## 2023-05-18 PROCEDURE — 85025 COMPLETE CBC W/AUTO DIFF WBC: CPT

## 2023-05-18 PROCEDURE — 99214 OFFICE O/P EST MOD 30 MIN: CPT | Performed by: PHYSICAL MEDICINE & REHABILITATION

## 2023-05-18 PROCEDURE — 74177 CT ABD & PELVIS W/CONTRAST: CPT

## 2023-05-18 PROCEDURE — 3075F SYST BP GE 130 - 139MM HG: CPT | Performed by: PHYSICAL MEDICINE & REHABILITATION

## 2023-05-18 PROCEDURE — 96374 THER/PROPH/DIAG INJ IV PUSH: CPT

## 2023-05-18 PROCEDURE — A9270 NON-COVERED ITEM OR SERVICE: HCPCS | Performed by: EMERGENCY MEDICINE

## 2023-05-18 PROCEDURE — 700102 HCHG RX REV CODE 250 W/ 637 OVERRIDE(OP): Performed by: EMERGENCY MEDICINE

## 2023-05-18 PROCEDURE — 83690 ASSAY OF LIPASE: CPT

## 2023-05-18 PROCEDURE — 700117 HCHG RX CONTRAST REV CODE 255: Performed by: EMERGENCY MEDICINE

## 2023-05-18 PROCEDURE — 80053 COMPREHEN METABOLIC PANEL: CPT

## 2023-05-18 RX ORDER — OMEPRAZOLE 20 MG/1
20 CAPSULE, DELAYED RELEASE ORAL 2 TIMES DAILY
Qty: 60 CAPSULE | Refills: 0 | Status: SHIPPED | OUTPATIENT
Start: 2023-05-18 | End: 2023-06-17

## 2023-05-18 RX ORDER — ACETAMINOPHEN 500 MG
1000 TABLET ORAL 3 TIMES DAILY PRN
Qty: 180 TABLET | Refills: 6 | Status: SHIPPED | OUTPATIENT
Start: 2023-05-18

## 2023-05-18 RX ORDER — SUCRALFATE 1 G/1
1 TABLET ORAL
Qty: 120 TABLET | Refills: 0 | Status: SHIPPED | OUTPATIENT
Start: 2023-05-18 | End: 2023-06-17

## 2023-05-18 RX ORDER — TIZANIDINE 4 MG/1
4 TABLET ORAL NIGHTLY PRN
Qty: 30 TABLET | Refills: 2 | Status: SHIPPED | OUTPATIENT
Start: 2023-05-18 | End: 2023-11-17

## 2023-05-18 RX ORDER — PANTOPRAZOLE SODIUM 40 MG/10ML
40 INJECTION, POWDER, LYOPHILIZED, FOR SOLUTION INTRAVENOUS ONCE
Status: COMPLETED | OUTPATIENT
Start: 2023-05-18 | End: 2023-05-18

## 2023-05-18 RX ADMIN — LIDOCAINE HYDROCHLORIDE 30 ML: 20 SOLUTION OROPHARYNGEAL at 13:33

## 2023-05-18 RX ADMIN — IOHEXOL 100 ML: 350 INJECTION, SOLUTION INTRAVENOUS at 14:29

## 2023-05-18 RX ADMIN — PANTOPRAZOLE SODIUM 40 MG: 40 INJECTION, POWDER, LYOPHILIZED, FOR SOLUTION INTRAVENOUS at 13:33

## 2023-05-18 ASSESSMENT — FIBROSIS 4 INDEX
FIB4 SCORE: 0.74
FIB4 SCORE: 0.74

## 2023-05-18 ASSESSMENT — PATIENT HEALTH QUESTIONNAIRE - PHQ9: CLINICAL INTERPRETATION OF PHQ2 SCORE: 0

## 2023-05-18 ASSESSMENT — PAIN SCALES - GENERAL: PAINLEVEL: 1=MINIMAL PAIN

## 2023-05-18 NOTE — Clinical Note
MarlySkyler was having some bloating and possible dyspepsia.  I discontinued the meloxicam and switch to Tylenol.  He is also going to [place] next week.  Would you be able to see him before then just to check in and make sure that he is good to be okay for his trip.  Thank you.  Maximus

## 2023-05-18 NOTE — PROGRESS NOTES
Follow up patient note  Interventional spine and Pain  Physiatry (physical medicine and  Rehabilitation)       Chief complaint:   Chief Complaint   Patient presents with    Follow-Up     Lumbar radiculopathy/spondylosis          HISTORY    Please see new patient note by Dr Hernández,  for more details.     HPI  Patient identification: Arias Nath , THEO 1960,   With Diagnoses of Lumbar spondylosis, Lumbar radiculopathy, SI (sacroiliac) joint dysfunction, Chronic left-sided low back pain with left-sided sciatica, DISH (diffuse idiopathic skeletal hyperostosis), BMI 30-39.9, and Exercise counseling were pertinent to this visit.     Procedures:  2023 left L4-5 and L5-S1 transforaminal epidural steroid injection Preprocedure pain 8/10 NRS postprocedure pain 1/10 NRS    Overall the patient's pain continues to be well controlled.  He has 1 out of 10 pain that can happen in the left lumbar spine region.  This is not causing functional deficits.  He is playing golf.  He has noted bloating and stomach upset when he is using meloxicam.  He denies numbness tingling or weakness.  Denies stomach pain.       ROS Red Flags :   Fever, Chills, Sweats: Denies  Involuntary Weight Loss: Denies  Bowel/Bladder Incontinence: Denies  Saddle Anesthesia: Denies        PMHx:   Past Medical History:   Diagnosis Date    Acute left-sided back pain with sciatica 2022    Cough 2018    Essential hypertension 2018    History of malignant melanoma of skin 2022    Found in 10/21 treated with excision by Dr. Mitchell.    Hypercholesterolemia 2019    Hypertension     Olecranon bursitis of left elbow 2018    Vitamin D deficiency 9/3/2019       PSHx:   Past Surgical History:   Procedure Laterality Date    LUMBAR TRANSFORAMINAL EPIDURAL STEROID INJECTION Left 2023    Procedure: LEFT L4-5 and L5-S1 transforaminal epidural steroid injection with fluoroscopic guidance;  Surgeon: Maximus Hernández M.D.;  Location:  [facility];  Service: Pain Management    KNEE ARTHROSCOPY Right 2005       Family history   Family History   Problem Relation Age of Onset    Colon Cancer Mother     Hypertension Mother     Hyperlipidemia Mother     No Known Problems Father          Medications:   Outpatient Medications Marked as Taking for the 5/18/23 encounter (Office Visit) with Maximus Hernández M.D.   Medication Sig Dispense Refill    acetaminophen (TYLENOL) 500 MG Tab Take 2 Tablets by mouth 3 times a day as needed (pain.  Do not exceed 3000 mg of total acetaminophen per day). 180 Tablet 6    tizanidine (ZANAFLEX) 4 MG Tab Take 1 Tablet by mouth at bedtime as needed (muscle spasms). 30 Tablet 2    losartan (COZAAR) 100 MG Tab TAKE ONE TABLET BY MOUTH EVERY DAY 90 Tablet 3    amLODIPine (NORVASC) 5 MG Tab Take 1 Tablet by mouth every day. 90 Tablet 3    rosuvastatin (CRESTOR) 5 MG Tab Take 1 Tablet by mouth every evening. 90 Tablet 3    Cholecalciferol (VITAMIN D3) 125 MCG (5000 UT) Cap Take 1 Cap by mouth every day. 30 Cap         Current Outpatient Medications on File Prior to Visit   Medication Sig Dispense Refill    losartan (COZAAR) 100 MG Tab TAKE ONE TABLET BY MOUTH EVERY DAY 90 Tablet 3    amLODIPine (NORVASC) 5 MG Tab Take 1 Tablet by mouth every day. 90 Tablet 3    rosuvastatin (CRESTOR) 5 MG Tab Take 1 Tablet by mouth every evening. 90 Tablet 3    Cholecalciferol (VITAMIN D3) 125 MCG (5000 UT) Cap Take 1 Cap by mouth every day. 30 Cap      No current facility-administered medications on file prior to visit.         Allergies:   Allergies   Allergen Reactions    Ace Inhibitors Cough       Social Hx:   Social History     Socioeconomic History    Marital status:      Spouse name: Not on file    Number of children: Not on file    Years of education: Not on file    Highest education level: Not on file   Occupational History    Not on file   Tobacco Use    Smoking status: Never    Smokeless tobacco: Never  "  Substance and Sexual Activity    Alcohol use: Yes     Alcohol/week: 0.6 oz     Types: 1 Cans of beer per week     Comment: social    Drug use: No    Sexual activity: Yes     Partners: Female     Comment: , govt contractor owner   Other Topics Concern    Not on file   Social History Narrative    Not on file     Social Determinants of Health     Financial Resource Strain: Not on file   Food Insecurity: Not on file   Transportation Needs: Not on file   Physical Activity: Not on file   Stress: Not on file   Social Connections: Not on file   Intimate Partner Violence: Not on file   Housing Stability: Not on file         EXAMINATION     Physical Exam:   Vitals: /78 (BP Location: Right arm, Patient Position: Sitting, BP Cuff Size: Adult)   Pulse 84   Temp 36.3 °C (97.3 °F) (Temporal)   Ht 1.803 m (5' 11\")   Wt 118 kg (259 lb 7.7 oz)   SpO2 96%     Constitutional:   Body Habitus: Body mass index is 36.19 kg/m².  Cooperation: Fully cooperates with exam  Appearance: Well-groomed no disheveled    Respiratory-  breathing comfortable on room air, no audible wheezing  Cardiovascular- capillary refills less than 2 seconds. No lower extremity edema is noted.   Psychiatric- alert and oriented ×3. Normal affect.    MSK and Neuro: -    Strength is 5 out of 5 throughout the bilateral lower extremities.  Extension rotation maneuver is negative bilaterally.  Sensation is intact.  Full range of motion lumbar spine.    MEDICAL DECISION MAKING    DATA    Labs:   Lab Results   Component Value Date/Time    SODIUM 139 03/28/2023 08:14 AM    POTASSIUM 4.4 03/28/2023 08:14 AM    CHLORIDE 104 03/28/2023 08:14 AM    CO2 25 03/28/2023 08:14 AM    GLUCOSE 101 (H) 03/28/2023 08:14 AM    BUN 17 03/28/2023 08:14 AM    CREATININE 0.93 03/28/2023 08:14 AM        No results found for: PROTHROMBTM, INR     Lab Results   Component Value Date/Time    WBC 5.6 04/05/2022 07:49 AM    RBC 5.32 04/05/2022 07:49 AM    HEMOGLOBIN 15.3 04/05/2022 " 07:49 AM    HEMATOCRIT 47.9 04/05/2022 07:49 AM    MCV 90.0 04/05/2022 07:49 AM    MCH 28.8 04/05/2022 07:49 AM    MCHC 31.9 (L) 04/05/2022 07:49 AM    MPV 9.0 04/05/2022 07:49 AM    NEUTSPOLYS 73.00 (H) 09/03/2019 12:46 PM    LYMPHOCYTES 12.20 (L) 09/03/2019 12:46 PM    MONOCYTES 9.60 09/03/2019 12:46 PM    EOSINOPHILS 1.70 09/03/2019 12:46 PM    BASOPHILS 0.90 09/03/2019 12:46 PM    HYPOCHROMIA 1+ 03/25/2011 09:07 AM        Lab Results   Component Value Date/Time    HBA1C 6.0 (H) 03/28/2023 08:14 AM          Imaging:   I personally reviewed following images    I reviewed the fluoroscopic images from 2/28/2023 showing successful left L4-5 and L5-S1 transforaminal epidural steroid injection.  I reviewed these with the patient at the visit on 3/27/2023.        I reviewed the following radiology reports                         Results for orders placed in visit on 02/16/23    MR-LUMBAR SPINE-W/O    Impression  1.  Degenerative disease in the lumbar spine as described above.  2.  There are combinations of facet joint arthropathy, prominent ligamentum flavum and small synovial cyst causing moderate effacement of the left lateral recess at the level of L4-5. The exiting left L5 nerve root might have been impinged at the lateral  recess.        Results for orders placed in visit on 02/16/23    MR-LUMBAR SPINE-W/O    Impression  1.  Degenerative disease in the lumbar spine as described above.  2.  There are combinations of facet joint arthropathy, prominent ligamentum flavum and small synovial cyst causing moderate effacement of the left lateral recess at the level of L4-5. The exiting left L5 nerve root might have been impinged at the lateral  recess.                                                                                                               Results for orders placed during the hospital encounter of 05/29/04    DX-KNEE COMPLETE 4+    Impression  IMPRESSION:      NEGATIVE RIGHT KNEE  SERIES.              READING DR:        MIGDALIA COATS MD  READING DATE:      May 29 2004  6:29PM  REPORT STATUS:     FINAL REPORT    TRANSCRIPTION CODE:         MOC  TRANSCRIPTION DATE:         May 29 2004  9:48PM    THIS DOCUMENT HAS BEEN ELECTRONICALLY SIGNED BY:  MAGAN RIOS MD,  PhD - May 30 2004  7:03AM    Results for orders placed in visit on 22    DX-LUMBAR SPINE-4+ VIEWS                        DIAGNOSIS   Visit Diagnoses     ICD-10-CM   1. Lumbar spondylosis  M47.816   2. Lumbar radiculopathy  M54.16   3. SI (sacroiliac) joint dysfunction  M53.3   4. Chronic left-sided low back pain with left-sided sciatica  M54.42    G89.29   5. DISH (diffuse idiopathic skeletal hyperostosis)  M48.10   6. BMI 30-39.9  E66.9   7. Exercise counseling  Z71.82         ASSESSMENT and PLAN:     Arias Pham  1960 male      Arias was seen today for follow-up.    Diagnoses and all orders for this visit:    Lumbar spondylosis  -     acetaminophen (TYLENOL) 500 MG Tab; Take 2 Tablets by mouth 3 times a day as needed (pain.  Do not exceed 3000 mg of total acetaminophen per day).  -     tizanidine (ZANAFLEX) 4 MG Tab; Take 1 Tablet by mouth at bedtime as needed (muscle spasms).    Lumbar radiculopathy  -     acetaminophen (TYLENOL) 500 MG Tab; Take 2 Tablets by mouth 3 times a day as needed (pain.  Do not exceed 3000 mg of total acetaminophen per day).  -     tizanidine (ZANAFLEX) 4 MG Tab; Take 1 Tablet by mouth at bedtime as needed (muscle spasms).    SI (sacroiliac) joint dysfunction  -     acetaminophen (TYLENOL) 500 MG Tab; Take 2 Tablets by mouth 3 times a day as needed (pain.  Do not exceed 3000 mg of total acetaminophen per day).  -     tizanidine (ZANAFLEX) 4 MG Tab; Take 1 Tablet by mouth at bedtime as needed (muscle spasms).    Chronic left-sided low back pain with left-sided sciatica  -     acetaminophen (TYLENOL) 500 MG Tab; Take 2 Tablets by mouth 3 times a day as needed (pain.  Do not  exceed 3000 mg of total acetaminophen per day).  -     tizanidine (ZANAFLEX) 4 MG Tab; Take 1 Tablet by mouth at bedtime as needed (muscle spasms).    DISH (diffuse idiopathic skeletal hyperostosis)  -     acetaminophen (TYLENOL) 500 MG Tab; Take 2 Tablets by mouth 3 times a day as needed (pain.  Do not exceed 3000 mg of total acetaminophen per day).    BMI 30-39.9    Exercise counseling      Discontinue meloxicam.  It certainly possible this is causing the patient's dyspepsia.  I sent a message to the patient's PCP Dr. Brewer.  The patient is leaving for [place] in approximately 1 week.    Follow up: 1 year or sooner as needed    Thank you for allowing me to participate in the care of this patient. If you have any questions please not hesitate to contact me.             Please note that this dictation was created using voice recognition software. I have made every reasonable attempt to correct obvious errors but there may be errors of grammar and content that I may have overlooked prior to finalization of this note.      Maximus Hernández MD  Interventional Spine and Sports Physiatry  Physical Medicine and Rehabilitation  [facility]       CC Leesa Brewer MD

## 2023-05-18 NOTE — ED NOTES
Pt discharged to spouse, reviewed all discharge instructions including medications and follow up, pt verbalizes understanding, and denies questions. Electronic prescription discussed with pt.  Escorted to lobby.

## 2023-05-18 NOTE — ED TRIAGE NOTES
Chief Complaint   Patient presents with    Abdominal Pain     Bloating in his general abd for the last few days, he takes Meloxicam for back pain, he stopped on Monday this week.

## 2023-05-18 NOTE — ED PROVIDER NOTES
"ED Provider Note    CHIEF COMPLAINT  Chief Complaint   Patient presents with    Abdominal Pain     Bloating in his general abd for the last few days, he takes Meloxicam for back pain, he stopped on Monday this week.       EXTERNAL RECORDS REVIEWED  Outpatient Notes      HPI/ROS  LIMITATION TO HISTORY     OUTSIDE HISTORIAN(S):  Significant other wife at bedside provides additional details    Arias Nath is a 62 y.o. male who presents to the emergency department chief complaint of abdominal pain and \"bloating\".  Patient describes general discomfort throughout his abdomen for the last several weeks that seems to be getting progressively worse.  Patient has chronic back problems has been seen pain management specialist for this and been getting injection but was also recently initiated on meloxicam.  Patient also states that he drinks a large amount of diet cola.  Patient reports that he intermittently has a nagging sensation throughout his abdomen recently and is worse at night.  He does state that the pain seems to be relieved immediately after eating.  He reports no dark tarry stools he had a normal bowel movement this morning no urinary issues he had minor nausea without emesis.  Yesterday had some slight chills without measured fever no headache no altered mental status cough congestion chest pain or shortness of breath.    PAST MEDICAL HISTORY   has a past medical history of Acute left-sided back pain with sciatica (11/4/2022), Cough (5/11/2018), Essential hypertension (5/11/2018), History of malignant melanoma of skin (1/7/2022), Hypercholesterolemia (11/6/2019), Hypertension, Olecranon bursitis of left elbow (6/27/2018), and Vitamin D deficiency (9/3/2019).    SURGICAL HISTORY   has a past surgical history that includes knee arthroscopy (Right, 2005) and lumbar transforaminal epidural steroid injection (Left, 2/28/2023).    FAMILY HISTORY  Family History   Problem Relation Age of Onset    Colon Cancer " "Mother     Hypertension Mother     Hyperlipidemia Mother     No Known Problems Father        SOCIAL HISTORY  Social History     Tobacco Use    Smoking status: Never    Smokeless tobacco: Never   Substance and Sexual Activity    Alcohol use: Yes     Alcohol/week: 0.6 oz     Types: 1 Cans of beer per week     Comment: social    Drug use: No    Sexual activity: Yes     Partners: Female     Comment: , govt contractor owner       CURRENT MEDICATIONS  Home Medications       Reviewed by Edna Patrick R.N. (Registered Nurse) on 05/18/23 at 1245  Med List Status: Not Addressed     Medication Last Dose Status   acetaminophen (TYLENOL) 500 MG Tab  Active   amLODIPine (NORVASC) 5 MG Tab  Active   Cholecalciferol (VITAMIN D3) 125 MCG (5000 UT) Cap  Active   losartan (COZAAR) 100 MG Tab  Active   rosuvastatin (CRESTOR) 5 MG Tab  Active   tizanidine (ZANAFLEX) 4 MG Tab  Active                    ALLERGIES  Allergies   Allergen Reactions    Ace Inhibitors Cough       PHYSICAL EXAM  VITAL SIGNS: BP (!) 166/82   Pulse 91   Temp 36.8 °C (98.2 °F) (Temporal)   Resp 16   Ht 1.803 m (5' 11\")   Wt 117 kg (257 lb 15 oz)   SpO2 96%   BMI 35.98 kg/m²    Pulse ox interpretation: I interpret this pulse ox as normal.  Constitutional: Alert and oriented x 3, Distress  HEENT: Atraumatic normocephalic, pupils are equal round reactive to light extraocular movements are intact. The nares is clear, external ears are normal, mouth shows moist mucous membranes normal dentition for age  Neck: Supple, no JVD no tracheal deviation  Cardiovascular: Regular rate and rhythm no murmur rub or gallop 2+ pulses peripherally x4  Thorax & Lungs: No respiratory distress, no wheezes rales or rhonchi, No chest tenderness.   GI: Tender to palpation diffusely without localization minor distention, morbid obesity  Skin: Warm dry no acute rash or lesion  Musculoskeletal: Moving all extremities with full range and 5 of 5 strength no acute  " deformity  Neurologic: Cranial nerves III through XII are grossly intact no sensory deficit no cerebellar dysfunction   Psychiatric: Appropriate affect for situation at this time         DIAGNOSTIC STUDIES / PROCEDURES  Results for orders placed or performed during the hospital encounter of 05/18/23   CBC WITH DIFFERENTIAL   Result Value Ref Range    WBC 8.8 4.8 - 10.8 K/uL    RBC 5.21 4.70 - 6.10 M/uL    Hemoglobin 15.1 14.0 - 18.0 g/dL    Hematocrit 45.0 42.0 - 52.0 %    MCV 86.4 81.4 - 97.8 fL    MCH 29.0 27.0 - 33.0 pg    MCHC 33.6 (L) 33.7 - 35.3 g/dL    RDW 41.0 35.9 - 50.0 fL    Platelet Count 222 164 - 446 K/uL    MPV 8.0 (L) 9.0 - 12.9 fL    Neutrophils-Polys 69.80 44.00 - 72.00 %    Lymphocytes 18.50 (L) 22.00 - 41.00 %    Monocytes 8.50 0.00 - 13.40 %    Eosinophils 2.70 0.00 - 6.90 %    Basophils 0.30 0.00 - 1.80 %    Immature Granulocytes 0.20 0.00 - 0.90 %    Nucleated RBC 0.00 /100 WBC    Neutrophils (Absolute) 6.12 1.82 - 7.42 K/uL    Lymphs (Absolute) 1.63 1.00 - 4.80 K/uL    Monos (Absolute) 0.75 0.00 - 0.85 K/uL    Eos (Absolute) 0.24 0.00 - 0.51 K/uL    Baso (Absolute) 0.03 0.00 - 0.12 K/uL    Immature Granulocytes (abs) 0.02 0.00 - 0.11 K/uL    NRBC (Absolute) 0.00 K/uL   COMP METABOLIC PANEL   Result Value Ref Range    Sodium 136 135 - 145 mmol/L    Potassium 4.0 3.6 - 5.5 mmol/L    Chloride 101 96 - 112 mmol/L    Co2 24 20 - 33 mmol/L    Anion Gap 11.0 7.0 - 16.0    Glucose 137 (H) 65 - 99 mg/dL    Bun 16 8 - 22 mg/dL    Creatinine 0.90 0.50 - 1.40 mg/dL    Calcium 9.9 8.4 - 10.2 mg/dL    AST(SGOT) 19 12 - 45 U/L    ALT(SGPT) 23 2 - 50 U/L    Alkaline Phosphatase 110 (H) 30 - 99 U/L    Total Bilirubin 0.6 0.1 - 1.5 mg/dL    Albumin 4.4 3.2 - 4.9 g/dL    Total Protein 7.8 6.0 - 8.2 g/dL    Globulin 3.4 1.9 - 3.5 g/dL    A-G Ratio 1.3 g/dL   LIPASE   Result Value Ref Range    Lipase 17 7 - 58 U/L   URINALYSIS    Specimen: Urine   Result Value Ref Range    Color Yellow     Character Clear      Specific Gravity 1.015 <1.035    Ph 5.0 5.0 - 8.0    Glucose Negative Negative mg/dL    Ketones 15 (A) Negative mg/dL    Protein Negative Negative mg/dL    Bilirubin Negative Negative    Nitrite Negative Negative    Leukocyte Esterase Negative Negative    Occult Blood Trace (A) Negative    Micro Urine Req Microscopic    URINE MICROSCOPIC (W/UA)   Result Value Ref Range    WBC 0-2 (A) /hpf    RBC 2-5 (A) /hpf    Mucous Threads Rare /hpf   CORRECTED CALCIUM   Result Value Ref Range    Correct Calcium 9.6 8.5 - 10.5 mg/dL   ESTIMATED GFR   Result Value Ref Range    GFR (CKD-EPI) 97 >60 mL/min/1.73 m 2         RADIOLOGY      COURSE & MEDICAL DECISION MAKING    ED Observation Status? Yes; I am placing the patient in to an observation status due to a diagnostic uncertainty as well as therapeutic intensity. Patient placed in observation status at 1335 PM, 5/18/2023.     Observation plan is as follows: Patient will require observation for completion of CT scan of the abdomen pelvis as well as basic laboratory evaluation CBC CMP lipase.    Upon Reevaluation, the patient's condition has: Improved; and will be discharged.    Patient discharged from ED Observation status at 1610 (Time) 5/18/23 (Date).     INITIAL ASSESSMENT, COURSE AND PLAN    Care Narrative: 62-year-old male presents with some intermittent nagging abdominal pain.  Sent here for concern of this abdominal pain by primary care physician.  Patient in full laboratory evaluation with CBC CMP lipase which are unrevealing for any acute abnormality.  He does have slight hyperglycemia.  Patient was given dose of Protonix and a dose of GI cocktail and had improvement of his symptoms.  Patient CT scan of the abdomen and pelvis demonstrated inflammation at the proximal duodenum concerning for peptic ulcer disease.  I discussed these findings at length with the patient instructed to avoid any acidic foods or beverages he is to cut down on his cola intake increase water  "intake maintain bland diet.  Patient will be placed on omeprazole twice daily he is also placed on Carafate patient is given instructions to follow-up with primary care within the next 1 to 2 weeks and he is instructed to follow-up with gastroenterology at the next available time.  Patient understands that he is to return to the emergency department immediately for any worsening abdominal pain distention bloating blood in stool blood in emesis any other acute symptom change or concern he is otherwise discharged in stable and improved condition.          HTN/IDDM FOLLOW UP:  The patient has known hypertension and is being followed by their primary care doctor      ADDITIONAL PROBLEM LIST    DISPOSITION AND DISCUSSIONS    I have discussed management of the patient with the following physicians and NIDHI's:      Discussion of management with other [facility] or appropriate source(s): None     Escalation of care considered, and ultimately not performed:acute inpatient care management, however at this time, the patient is most appropriate for outpatient management    Barriers to care at this time, including but not limited to: .     Decision tools and prescription drugs considered including, but not limited to:  Placed on gastric Verrier medication as well as PPI .      BP (!) 158/85   Pulse 68   Temp 36.7 °C (98.1 °F) (Temporal)   Resp 16   Ht 1.803 m (5' 11\")   Wt 117 kg (257 lb 15 oz)   SpO2 97%   BMI 35.98 kg/m²       FINAL IMPRESSION  1. PUD (peptic ulcer disease) Active   2. Duodenitis        PRESCRIPTIONS  Discharge Medication List as of 5/18/2023  4:01 PM        START taking these medications    Details   omeprazole (PRILOSEC) 20 MG delayed-release capsule Take 1 Capsule by mouth 2 times a day for 30 days., Disp-60 Capsule, R-0, Normal      sucralfate (CARAFATE) 1 GM Tab Take 1 Tablet by mouth 4 Times a Day,Before Meals and at Bedtime for 30 days. Dissolve in 10 cc of water prior to ingestion, Disp-120 Tablet, R-0, " Normal             FOLLOW UP:      Leesa Brewer M.D.  [address]  [place] [id number] [phone]    In 1 week      [facility], Emergency Dept  [address] [place] [id number] [phone]    in 12-24 hours if symptoms persist, immediately If symptoms worsen, or if you develop any other symptoms or concerns        -DISCHARGE-

## 2023-06-26 ENCOUNTER — APPOINTMENT (RX ONLY)
Dept: URBAN - METROPOLITAN AREA CLINIC 4 | Facility: CLINIC | Age: 63
Setting detail: DERMATOLOGY
End: 2023-06-26

## 2023-06-26 DIAGNOSIS — D22 MELANOCYTIC NEVI: ICD-10-CM

## 2023-06-26 DIAGNOSIS — L81.4 OTHER MELANIN HYPERPIGMENTATION: ICD-10-CM

## 2023-06-26 DIAGNOSIS — L57.8 OTHER SKIN CHANGES DUE TO CHRONIC EXPOSURE TO NONIONIZING RADIATION: ICD-10-CM

## 2023-06-26 DIAGNOSIS — Z85.820 PERSONAL HISTORY OF MALIGNANT MELANOMA OF SKIN: ICD-10-CM

## 2023-06-26 DIAGNOSIS — D18.0 HEMANGIOMA: ICD-10-CM

## 2023-06-26 DIAGNOSIS — L82.1 OTHER SEBORRHEIC KERATOSIS: ICD-10-CM

## 2023-06-26 PROBLEM — D18.01 HEMANGIOMA OF SKIN AND SUBCUTANEOUS TISSUE: Status: ACTIVE | Noted: 2023-06-26

## 2023-06-26 PROBLEM — D22.5 MELANOCYTIC NEVI OF TRUNK: Status: ACTIVE | Noted: 2023-06-26

## 2023-06-26 PROCEDURE — ? COUNSELING

## 2023-06-26 PROCEDURE — 99213 OFFICE O/P EST LOW 20 MIN: CPT

## 2023-06-26 ASSESSMENT — LOCATION DETAILED DESCRIPTION DERM
LOCATION DETAILED: LEFT UPPER BACK
LOCATION DETAILED: RIGHT CHEEK
LOCATION DETAILED: RIGHT DORSAL FOREARM
LOCATION DETAILED: RIGHT UPPER BACK
LOCATION DETAILED: LEFT DORSAL HAND
LOCATION DETAILED: RIGHT ANTERIOR THIGH
LOCATION DETAILED: RIGHT DORSAL HAND
LOCATION DETAILED: LEFT CHEEK
LOCATION DETAILED: LEFT DORSAL FOREARM
LOCATION DETAILED: LEFT ANTERIOR THIGH

## 2023-06-26 ASSESSMENT — LOCATION SIMPLE DESCRIPTION DERM
LOCATION SIMPLE: BACK
LOCATION SIMPLE: LEFT UPPER EXTREMITY
LOCATION SIMPLE: RIGHT HAND
LOCATION SIMPLE: RIGHT LOWER EXTREMITY
LOCATION SIMPLE: RIGHT UPPER EXTREMITY
LOCATION SIMPLE: RIGHT CHEEK
LOCATION SIMPLE: LEFT LOWER EXTREMITY
LOCATION SIMPLE: LEFT CHEEK
LOCATION SIMPLE: LEFT HAND

## 2023-06-26 ASSESSMENT — LOCATION ZONE DERM
LOCATION ZONE: TRUNK
LOCATION ZONE: LEG
LOCATION ZONE: FACE
LOCATION ZONE: ARM
LOCATION ZONE: HAND

## 2023-06-26 NOTE — PROCEDURE: MIPS QUALITY
Quality 138: Melanoma: Coordination Of Care: A treatment plan was communicated to the physicians providing continuing care within one month of diagnosis outlining: diagnosis, tumor thickness and a plan for surgery or alternate care.
Detail Level: Detailed
Quality 130: Documentation Of Current Medications In The Medical Record: Current Medications Documented
Detail Level: Detailed
Quality 130: Documentation Of Current Medications In The Medical Record: Current Medications Documented
Quality 137: Melanoma: Continuity Of Care - Recall System: Patient information entered into a recall system that includes: target date for the next exam specified AND a process to follow up with patients regarding missed or unscheduled appointments
Quality 226: Preventive Care And Screening: Tobacco Use: Screening And Cessation Intervention: Patient screened for tobacco use and is an ex/non-smoker

## 2023-07-11 RX ORDER — AMLODIPINE BESYLATE 5 MG/1
5 TABLET ORAL DAILY
Qty: 90 TABLET | Refills: 3 | Status: SHIPPED | OUTPATIENT
Start: 2023-07-11

## 2023-10-16 ENCOUNTER — PATIENT MESSAGE (OUTPATIENT)
Dept: INTERNAL MEDICINE | Facility: IMAGING CENTER | Age: 63
End: 2023-10-16
Payer: COMMERCIAL

## 2023-10-16 DIAGNOSIS — E78.00 HYPERCHOLESTEROLEMIA: ICD-10-CM

## 2023-10-24 ENCOUNTER — APPOINTMENT (RX ONLY)
Dept: URBAN - METROPOLITAN AREA CLINIC 4 | Facility: CLINIC | Age: 63
Setting detail: DERMATOLOGY
End: 2023-10-24

## 2023-10-24 DIAGNOSIS — L82.1 OTHER SEBORRHEIC KERATOSIS: ICD-10-CM

## 2023-10-24 DIAGNOSIS — L57.8 OTHER SKIN CHANGES DUE TO CHRONIC EXPOSURE TO NONIONIZING RADIATION: ICD-10-CM

## 2023-10-24 DIAGNOSIS — L81.4 OTHER MELANIN HYPERPIGMENTATION: ICD-10-CM

## 2023-10-24 DIAGNOSIS — D22 MELANOCYTIC NEVI: ICD-10-CM

## 2023-10-24 DIAGNOSIS — D18.0 HEMANGIOMA: ICD-10-CM

## 2023-10-24 DIAGNOSIS — Z85.820 PERSONAL HISTORY OF MALIGNANT MELANOMA OF SKIN: ICD-10-CM

## 2023-10-24 PROBLEM — D18.01 HEMANGIOMA OF SKIN AND SUBCUTANEOUS TISSUE: Status: ACTIVE | Noted: 2023-10-24

## 2023-10-24 PROBLEM — D22.5 MELANOCYTIC NEVI OF TRUNK: Status: ACTIVE | Noted: 2023-10-24

## 2023-10-24 PROCEDURE — ? COUNSELING

## 2023-10-24 PROCEDURE — 99213 OFFICE O/P EST LOW 20 MIN: CPT

## 2023-10-24 ASSESSMENT — LOCATION DETAILED DESCRIPTION DERM
LOCATION DETAILED: RIGHT ANTERIOR THIGH
LOCATION DETAILED: LEFT UPPER BACK
LOCATION DETAILED: LEFT ANTERIOR THIGH
LOCATION DETAILED: LEFT DORSAL FOREARM
LOCATION DETAILED: LEFT DORSAL HAND
LOCATION DETAILED: RIGHT DORSAL FOREARM
LOCATION DETAILED: RIGHT DORSAL HAND
LOCATION DETAILED: LEFT CHEEK
LOCATION DETAILED: RIGHT UPPER BACK
LOCATION DETAILED: RIGHT CHEEK

## 2023-10-24 ASSESSMENT — LOCATION SIMPLE DESCRIPTION DERM
LOCATION SIMPLE: LEFT CHEEK
LOCATION SIMPLE: RIGHT CHEEK
LOCATION SIMPLE: BACK
LOCATION SIMPLE: LEFT HAND
LOCATION SIMPLE: LEFT LOWER EXTREMITY
LOCATION SIMPLE: RIGHT HAND
LOCATION SIMPLE: LEFT UPPER EXTREMITY
LOCATION SIMPLE: RIGHT LOWER EXTREMITY
LOCATION SIMPLE: RIGHT UPPER EXTREMITY

## 2023-10-24 ASSESSMENT — LOCATION ZONE DERM
LOCATION ZONE: HAND
LOCATION ZONE: TRUNK
LOCATION ZONE: ARM
LOCATION ZONE: FACE
LOCATION ZONE: LEG

## 2023-10-24 NOTE — HPI: EVALUATION OF SKIN LESION(S)
What Type Of Note Output Would You Prefer (Optional)?: Standard Output
Hpi Title: Evaluation of Skin Lesions
Location: Right cheek
Year Removed: 9/21

## 2023-10-30 RX ORDER — ROSUVASTATIN CALCIUM 5 MG/1
5 TABLET, COATED ORAL EVERY EVENING
Qty: 90 TABLET | Refills: 3 | Status: SHIPPED | OUTPATIENT
Start: 2023-10-30

## 2023-10-31 ENCOUNTER — OFFICE VISIT (OUTPATIENT)
Dept: PHYSICAL MEDICINE AND REHAB | Facility: MEDICAL CENTER | Age: 63
End: 2023-10-31
Payer: COMMERCIAL

## 2023-10-31 VITALS
HEIGHT: 71 IN | OXYGEN SATURATION: 96 % | SYSTOLIC BLOOD PRESSURE: 130 MMHG | BODY MASS INDEX: 36.11 KG/M2 | HEART RATE: 82 BPM | DIASTOLIC BLOOD PRESSURE: 78 MMHG | TEMPERATURE: 98 F | WEIGHT: 257.94 LBS

## 2023-10-31 DIAGNOSIS — M54.16 LUMBAR RADICULOPATHY: ICD-10-CM

## 2023-10-31 DIAGNOSIS — M54.42 CHRONIC LEFT-SIDED LOW BACK PAIN WITH LEFT-SIDED SCIATICA: ICD-10-CM

## 2023-10-31 DIAGNOSIS — M53.3 SI (SACROILIAC) JOINT DYSFUNCTION: ICD-10-CM

## 2023-10-31 DIAGNOSIS — G89.29 CHRONIC LEFT-SIDED LOW BACK PAIN WITH LEFT-SIDED SCIATICA: ICD-10-CM

## 2023-10-31 DIAGNOSIS — M48.10 DISH (DIFFUSE IDIOPATHIC SKELETAL HYPEROSTOSIS): ICD-10-CM

## 2023-10-31 DIAGNOSIS — M47.816 LUMBAR SPONDYLOSIS: ICD-10-CM

## 2023-10-31 DIAGNOSIS — E66.9 OBESITY (BMI 30-39.9): ICD-10-CM

## 2023-10-31 DIAGNOSIS — Z71.82 EXERCISE COUNSELING: ICD-10-CM

## 2023-10-31 PROCEDURE — 99214 OFFICE O/P EST MOD 30 MIN: CPT | Performed by: PHYSICAL MEDICINE & REHABILITATION

## 2023-10-31 PROCEDURE — 1125F AMNT PAIN NOTED PAIN PRSNT: CPT | Performed by: PHYSICAL MEDICINE & REHABILITATION

## 2023-10-31 PROCEDURE — 3078F DIAST BP <80 MM HG: CPT | Performed by: PHYSICAL MEDICINE & REHABILITATION

## 2023-10-31 PROCEDURE — 3075F SYST BP GE 130 - 139MM HG: CPT | Performed by: PHYSICAL MEDICINE & REHABILITATION

## 2023-10-31 ASSESSMENT — PATIENT HEALTH QUESTIONNAIRE - PHQ9: CLINICAL INTERPRETATION OF PHQ2 SCORE: 0

## 2023-10-31 ASSESSMENT — FIBROSIS 4 INDEX: FIB4 SCORE: 1.07

## 2023-10-31 ASSESSMENT — PAIN SCALES - GENERAL: PAINLEVEL: 7=MODERATE-SEVERE PAIN

## 2023-10-31 NOTE — PROGRESS NOTES
Follow up patient note  Interventional spine and Pain  Physiatry (physical medicine and  Rehabilitation)       Chief complaint:   Chief Complaint   Patient presents with    Follow-Up     Lumbar spondylosis          HISTORY    Please see new patient note by Dr Hernández,  for more details.     HPI  Patient identification: Arias Nath , THEO 1960,   With Diagnoses of Lumbar radiculopathy, Lumbar spondylosis, SI (sacroiliac) joint dysfunction, Chronic left-sided low back pain with left-sided sciatica, DISH (diffuse idiopathic skeletal hyperostosis), BMI 30-39.9, and Exercise counseling were pertinent to this visit.     Procedures:  2023 left L4-5 and L5-S1 transforaminal epidural steroid injection Preprocedure pain 8/10 NRS postprocedure pain 1/10 NRS      The patient had excellent pain relief after the injection. He was able to ski the rest of the season, play golf all summer. He had excellent pain relief and functional improvement. Two weeks ago he went on a long plane ride across the country and was lifting luggage. He had a recurrence of his severe pain in the left low back radiating to his buttocks. His pain is worse with lumbar flexion and worse with sitting.          ROS Red Flags :   Fever, Chills, Sweats: Denies  Involuntary Weight Loss: Denies  Bowel/Bladder Incontinence: Denies  Saddle Anesthesia: Denies        PMHx:   Past Medical History:   Diagnosis Date    Acute left-sided back pain with sciatica 2022    Cough 2018    Essential hypertension 2018    History of malignant melanoma of skin 2022    Found in 10/21 treated with excision by Dr. Mitchell.    Hypercholesterolemia 2019    Hypertension     Olecranon bursitis of left elbow 2018    Vitamin D deficiency 9/3/2019       PSHx:   Past Surgical History:   Procedure Laterality Date    LUMBAR TRANSFORAMINAL EPIDURAL STEROID INJECTION Left 2023    Procedure: LEFT L4-5 and L5-S1 transforaminal epidural steroid  injection with fluoroscopic guidance;  Surgeon: Maximus Hernández M.D.;  [facility];  Service: Pain Management    KNEE ARTHROSCOPY Right 2005       Family history   Family History   Problem Relation Age of Onset    Colon Cancer Mother     Hypertension Mother     Hyperlipidemia Mother     No Known Problems Father          Medications:   Outpatient Medications Marked as Taking for the 10/31/23 encounter (Office Visit) with Maximus Hernández M.D.   Medication Sig Dispense Refill    rosuvastatin (CRESTOR) 5 MG Tab Take 1 Tablet by mouth every evening. 90 Tablet 3    amLODIPine (NORVASC) 5 MG Tab Take 1 Tablet by mouth every day. 90 Tablet 3    acetaminophen (TYLENOL) 500 MG Tab Take 2 Tablets by mouth 3 times a day as needed (pain.  Do not exceed 3000 mg of total acetaminophen per day). 180 Tablet 6    tizanidine (ZANAFLEX) 4 MG Tab Take 1 Tablet by mouth at bedtime as needed (muscle spasms). 30 Tablet 2    losartan (COZAAR) 100 MG Tab TAKE ONE TABLET BY MOUTH EVERY DAY 90 Tablet 3    Cholecalciferol (VITAMIN D3) 125 MCG (5000 UT) Cap Take 1 Cap by mouth every day. 30 Cap         Current Outpatient Medications on File Prior to Visit   Medication Sig Dispense Refill    rosuvastatin (CRESTOR) 5 MG Tab Take 1 Tablet by mouth every evening. 90 Tablet 3    amLODIPine (NORVASC) 5 MG Tab Take 1 Tablet by mouth every day. 90 Tablet 3    acetaminophen (TYLENOL) 500 MG Tab Take 2 Tablets by mouth 3 times a day as needed (pain.  Do not exceed 3000 mg of total acetaminophen per day). 180 Tablet 6    tizanidine (ZANAFLEX) 4 MG Tab Take 1 Tablet by mouth at bedtime as needed (muscle spasms). 30 Tablet 2    losartan (COZAAR) 100 MG Tab TAKE ONE TABLET BY MOUTH EVERY DAY 90 Tablet 3    Cholecalciferol (VITAMIN D3) 125 MCG (5000 UT) Cap Take 1 Cap by mouth every day. 30 Cap      No current facility-administered medications on file prior to visit.         Allergies:   Allergies   Allergen Reactions    Ace  "Inhibitors Cough       Social Hx:   Social History     Socioeconomic History    Marital status:      Spouse name: Not on file    Number of children: Not on file    Years of education: Not on file    Highest education level: Not on file   Occupational History    Not on file   Tobacco Use    Smoking status: Never    Smokeless tobacco: Never   Substance and Sexual Activity    Alcohol use: Yes     Alcohol/week: 0.6 oz     Types: 1 Cans of beer per week     Comment: social    Drug use: No    Sexual activity: Yes     Partners: Female     Comment: , govt contractor owner   Other Topics Concern    Not on file   Social History Narrative    Not on file     Social Determinants of Health     Financial Resource Strain: Not on file   Food Insecurity: Not on file   Transportation Needs: Not on file   Physical Activity: Not on file   Stress: Not on file   Social Connections: Not on file   Intimate Partner Violence: Not on file   Housing Stability: Not on file         EXAMINATION     Physical Exam:   Vitals: /78   Pulse 82   Temp 36.7 °C (98 °F) (Temporal)   Ht 1.803 m (5' 11\")   Wt 117 kg (257 lb 15 oz)   SpO2 96%     Constitutional:   Body Habitus: Body mass index is 35.98 kg/m².  Cooperation: Fully cooperates with exam  Appearance: Well-groomed no disheveled    Respiratory-  breathing comfortable on room air, no audible wheezing  Cardiovascular- capillary refills less than 2 seconds. No lower extremity edema is noted.   Psychiatric- alert and oriented ×3. Normal affect.    MSK and Neuro: -      Thoracic/Lumbar Spine/Sacral Spine/Hips   There are no signs of infection around the injection sites.   decreased active range of motion with flexion, lateral flexion, and rotation bilaterally.   There is full  active range of motion with lumbar extension.      Palpation:   No tenderness to palpation in midline at T1-T12 levels. No tenderness to palpation in the left and right of the midline T1-L5, NEGATIVE for " "tenderness to palpation to the para-midline region in the lower lumbar levels.  palpation over SI joint: negative bilaterally    palpation in hip or over the gluteus medius tendon insertion: negative bilaterally      Lumbar spine Special tests  Neuro tension  Straight leg test negative right, positive left    Slump test negative right, positive left      Key points for the international standards for neurological classification of spinal cord injury (ISNCSCI) to light touch.     Dermatome R L                                      L2 2 2   L3 2 2   L4 2 1   L5 2 1   S1 2 2   S2 2 2         Motor Exam Lower Extremities    ? Myotome R L   Hip flexion L2 5 5   Knee extension L3 5 5   Ankle dorsiflexion L4 5 4   Toe extension L5 5 4   Ankle plantarflexion S1 5 5            MEDICAL DECISION MAKING    DATA    Labs:   Lab Results   Component Value Date/Time    SODIUM 136 05/18/2023 01:07 PM    POTASSIUM 4.0 05/18/2023 01:07 PM    CHLORIDE 101 05/18/2023 01:07 PM    CO2 24 05/18/2023 01:07 PM    GLUCOSE 137 (H) 05/18/2023 01:07 PM    BUN 16 05/18/2023 01:07 PM    CREATININE 0.90 05/18/2023 01:07 PM        No results found for: \"PROTHROMBTM\", \"INR\"     Lab Results   Component Value Date/Time    WBC 8.8 05/18/2023 01:07 PM    RBC 5.21 05/18/2023 01:07 PM    HEMOGLOBIN 15.1 05/18/2023 01:07 PM    HEMATOCRIT 45.0 05/18/2023 01:07 PM    MCV 86.4 05/18/2023 01:07 PM    MCH 29.0 05/18/2023 01:07 PM    MCHC 33.6 (L) 05/18/2023 01:07 PM    MPV 8.0 (L) 05/18/2023 01:07 PM    NEUTSPOLYS 69.80 05/18/2023 01:07 PM    LYMPHOCYTES 18.50 (L) 05/18/2023 01:07 PM    MONOCYTES 8.50 05/18/2023 01:07 PM    EOSINOPHILS 2.70 05/18/2023 01:07 PM    BASOPHILS 0.30 05/18/2023 01:07 PM    HYPOCHROMIA 1+ 03/25/2011 09:07 AM        Lab Results   Component Value Date/Time    HBA1C 6.0 (H) 03/28/2023 08:14 AM          Imaging:   I personally reviewed following images    I reviewed the fluoroscopic images from 2/28/2023 showing successful left L4-5 and " L5-S1 transforaminal epidural steroid injection.  I reviewed these with the patient at the visit on 3/27/2023.        I reviewed the following radiology reports                         Results for orders placed in visit on 02/16/23    MR-LUMBAR SPINE-W/O    Impression  1.  Degenerative disease in the lumbar spine as described above.  2.  There are combinations of facet joint arthropathy, prominent ligamentum flavum and small synovial cyst causing moderate effacement of the left lateral recess at the level of L4-5. The exiting left L5 nerve root might have been impinged at the lateral  recess.        Results for orders placed in visit on 02/16/23    MR-LUMBAR SPINE-W/O    Impression  1.  Degenerative disease in the lumbar spine as described above.  2.  There are combinations of facet joint arthropathy, prominent ligamentum flavum and small synovial cyst causing moderate effacement of the left lateral recess at the level of L4-5. The exiting left L5 nerve root might have been impinged at the lateral  recess.                                                                                                               Results for orders placed during the hospital encounter of 05/29/04    DX-KNEE COMPLETE 4+    Impression  IMPRESSION:      NEGATIVE RIGHT KNEE SERIES.              READING DR:        MIGDALIA COATS MD  READING DATE:      May 29 2004  6:29PM  REPORT STATUS:     FINAL REPORT    TRANSCRIPTION CODE:         [facility]  TRANSCRIPTION DATE:         May 29 2004  9:48PM    THIS DOCUMENT HAS BEEN ELECTRONICALLY SIGNED BY:  MAGAN RIOS MD,  PhD - May 30 2004  7:03AM    Results for orders placed in visit on 08/04/22    DX-LUMBAR SPINE-4+ VIEWS                        DIAGNOSIS   Visit Diagnoses     ICD-10-CM   1. Lumbar radiculopathy  M54.16   2. Lumbar spondylosis  M47.816   3. SI (sacroiliac) joint dysfunction  M53.3   4. Chronic left-sided low back pain with left-sided sciatica  M54.42    G89.29   5. DISH  (diffuse idiopathic skeletal hyperostosis)  M48.10   6. BMI 30-39.9  E66.9   7. Exercise counseling  Z71.82         ASSESSMENT and PLAN:     Arias Nath  1960 male      Arias was seen today for follow-up.    Diagnoses and all orders for this visit:    Lumbar radiculopathy  -     Referral to Physical Medicine Rehab    Lumbar spondylosis    SI (sacroiliac) joint dysfunction    Chronic left-sided low back pain with left-sided sciatica    DISH (diffuse idiopathic skeletal hyperostosis)    BMI 30-39.9    Exercise counseling      The patient is had a recurrence of his lumbar radiculopathy with severe pain in the left low back rating the left buttocks.  Previously had excellent pain relief with greater than 80% pain relief and functional improvement after the last injection for greater than 6 months.    Now the pain is severe.  He has had family events happen with his mother-in-law passing away and has travel across the country and has severe pain with sitting.  We discussed obtaining new imaging however given the patient's symptoms are similar as prior to the previous injection it is likely that is a recurrence of the previous lumbar radiculopathy.  The patient has no red flag signs.  He understands emergency precautions.    We will proceed with an urgent left L4-5 and L5-S1 transforaminal epidural steroid injection.    The risks benefits and alternatives to this procedure were discussed and the patient wishes to proceed with the procedure. Risks include but are not limited to damage to surrounding structures, infection, bleeding, worsening of pain which can be permanent, weakness which can be permanent. Benefits include pain relief, improved function. Alternatives includes not doing the procedure.        Follow up: After the above procedure    Thank you for allowing me to participate in the care of this patient. If you have any questions please not hesitate to contact me.             Please note that this  dictation was created using voice recognition software. I have made every reasonable attempt to correct obvious errors but there may be errors of grammar and content that I may have overlooked prior to finalization of this note.      Maximus Hernández MD  Interventional Spine and Sports Physiatry  Physical Medicine and Rehabilitation  [facility]        Leesa Brewer MD

## 2023-10-31 NOTE — H&P (VIEW-ONLY)
Follow up patient note  Interventional spine and Pain  Physiatry (physical medicine and  Rehabilitation)       Chief complaint:   Chief Complaint   Patient presents with    Follow-Up     Lumbar spondylosis          HISTORY    Please see new patient note by Dr Hernández,  for more details.     HPI  Patient identification: Arias Nath , THEO 1960,   With Diagnoses of Lumbar radiculopathy, Lumbar spondylosis, SI (sacroiliac) joint dysfunction, Chronic left-sided low back pain with left-sided sciatica, DISH (diffuse idiopathic skeletal hyperostosis), BMI 30-39.9, and Exercise counseling were pertinent to this visit.     Procedures:  2023 left L4-5 and L5-S1 transforaminal epidural steroid injection Preprocedure pain 8/10 NRS postprocedure pain 1/10 NRS      The patient had excellent pain relief after the injection. He was able to ski the rest of the season, play golf all summer. He had excellent pain relief and functional improvement. Two weeks ago he went on a long plane ride across the country and was lifting luggage. He had a recurrence of his severe pain in the left low back radiating to his buttocks. His pain is worse with lumbar flexion and worse with sitting.          ROS Red Flags :   Fever, Chills, Sweats: Denies  Involuntary Weight Loss: Denies  Bowel/Bladder Incontinence: Denies  Saddle Anesthesia: Denies        PMHx:   Past Medical History:   Diagnosis Date    Acute left-sided back pain with sciatica 2022    Cough 2018    Essential hypertension 2018    History of malignant melanoma of skin 2022    Found in 10/21 treated with excision by Dr. Mitchell.    Hypercholesterolemia 2019    Hypertension     Olecranon bursitis of left elbow 2018    Vitamin D deficiency 9/3/2019       PSHx:   Past Surgical History:   Procedure Laterality Date    LUMBAR TRANSFORAMINAL EPIDURAL STEROID INJECTION Left 2023    Procedure: LEFT L4-5 and L5-S1 transforaminal epidural steroid  injection with fluoroscopic guidance;  Surgeon: Maximus Hernández M.D.;  [facility];  Service: Pain Management    KNEE ARTHROSCOPY Right 2005       Family history   Family History   Problem Relation Age of Onset    Colon Cancer Mother     Hypertension Mother     Hyperlipidemia Mother     No Known Problems Father          Medications:   Outpatient Medications Marked as Taking for the 10/31/23 encounter (Office Visit) with Maximus Hernández M.D.   Medication Sig Dispense Refill    rosuvastatin (CRESTOR) 5 MG Tab Take 1 Tablet by mouth every evening. 90 Tablet 3    amLODIPine (NORVASC) 5 MG Tab Take 1 Tablet by mouth every day. 90 Tablet 3    acetaminophen (TYLENOL) 500 MG Tab Take 2 Tablets by mouth 3 times a day as needed (pain.  Do not exceed 3000 mg of total acetaminophen per day). 180 Tablet 6    tizanidine (ZANAFLEX) 4 MG Tab Take 1 Tablet by mouth at bedtime as needed (muscle spasms). 30 Tablet 2    losartan (COZAAR) 100 MG Tab TAKE ONE TABLET BY MOUTH EVERY DAY 90 Tablet 3    Cholecalciferol (VITAMIN D3) 125 MCG (5000 UT) Cap Take 1 Cap by mouth every day. 30 Cap         Current Outpatient Medications on File Prior to Visit   Medication Sig Dispense Refill    rosuvastatin (CRESTOR) 5 MG Tab Take 1 Tablet by mouth every evening. 90 Tablet 3    amLODIPine (NORVASC) 5 MG Tab Take 1 Tablet by mouth every day. 90 Tablet 3    acetaminophen (TYLENOL) 500 MG Tab Take 2 Tablets by mouth 3 times a day as needed (pain.  Do not exceed 3000 mg of total acetaminophen per day). 180 Tablet 6    tizanidine (ZANAFLEX) 4 MG Tab Take 1 Tablet by mouth at bedtime as needed (muscle spasms). 30 Tablet 2    losartan (COZAAR) 100 MG Tab TAKE ONE TABLET BY MOUTH EVERY DAY 90 Tablet 3    Cholecalciferol (VITAMIN D3) 125 MCG (5000 UT) Cap Take 1 Cap by mouth every day. 30 Cap      No current facility-administered medications on file prior to visit.         Allergies:   Allergies   Allergen Reactions    Ace  "Inhibitors Cough       Social Hx:   Social History     Socioeconomic History    Marital status:      Spouse name: Not on file    Number of children: Not on file    Years of education: Not on file    Highest education level: Not on file   Occupational History    Not on file   Tobacco Use    Smoking status: Never    Smokeless tobacco: Never   Substance and Sexual Activity    Alcohol use: Yes     Alcohol/week: 0.6 oz     Types: 1 Cans of beer per week     Comment: social    Drug use: No    Sexual activity: Yes     Partners: Female     Comment: , govt contractor owner   Other Topics Concern    Not on file   Social History Narrative    Not on file     Social Determinants of Health     Financial Resource Strain: Not on file   Food Insecurity: Not on file   Transportation Needs: Not on file   Physical Activity: Not on file   Stress: Not on file   Social Connections: Not on file   Intimate Partner Violence: Not on file   Housing Stability: Not on file         EXAMINATION     Physical Exam:   Vitals: /78   Pulse 82   Temp 36.7 °C (98 °F) (Temporal)   Ht 1.803 m (5' 11\")   Wt 117 kg (257 lb 15 oz)   SpO2 96%     Constitutional:   Body Habitus: Body mass index is 35.98 kg/m².  Cooperation: Fully cooperates with exam  Appearance: Well-groomed no disheveled    Respiratory-  breathing comfortable on room air, no audible wheezing  Cardiovascular- capillary refills less than 2 seconds. No lower extremity edema is noted.   Psychiatric- alert and oriented ×3. Normal affect.    MSK and Neuro: -      Thoracic/Lumbar Spine/Sacral Spine/Hips   There are no signs of infection around the injection sites.   decreased active range of motion with flexion, lateral flexion, and rotation bilaterally.   There is full  active range of motion with lumbar extension.      Palpation:   No tenderness to palpation in midline at T1-T12 levels. No tenderness to palpation in the left and right of the midline T1-L5, NEGATIVE for " "tenderness to palpation to the para-midline region in the lower lumbar levels.  palpation over SI joint: negative bilaterally    palpation in hip or over the gluteus medius tendon insertion: negative bilaterally      Lumbar spine Special tests  Neuro tension  Straight leg test negative right, positive left    Slump test negative right, positive left      Key points for the international standards for neurological classification of spinal cord injury (ISNCSCI) to light touch.     Dermatome R L                                      L2 2 2   L3 2 2   L4 2 1   L5 2 1   S1 2 2   S2 2 2         Motor Exam Lower Extremities    ? Myotome R L   Hip flexion L2 5 5   Knee extension L3 5 5   Ankle dorsiflexion L4 5 4   Toe extension L5 5 4   Ankle plantarflexion S1 5 5            MEDICAL DECISION MAKING    DATA    Labs:   Lab Results   Component Value Date/Time    SODIUM 136 05/18/2023 01:07 PM    POTASSIUM 4.0 05/18/2023 01:07 PM    CHLORIDE 101 05/18/2023 01:07 PM    CO2 24 05/18/2023 01:07 PM    GLUCOSE 137 (H) 05/18/2023 01:07 PM    BUN 16 05/18/2023 01:07 PM    CREATININE 0.90 05/18/2023 01:07 PM        No results found for: \"PROTHROMBTM\", \"INR\"     Lab Results   Component Value Date/Time    WBC 8.8 05/18/2023 01:07 PM    RBC 5.21 05/18/2023 01:07 PM    HEMOGLOBIN 15.1 05/18/2023 01:07 PM    HEMATOCRIT 45.0 05/18/2023 01:07 PM    MCV 86.4 05/18/2023 01:07 PM    MCH 29.0 05/18/2023 01:07 PM    MCHC 33.6 (L) 05/18/2023 01:07 PM    MPV 8.0 (L) 05/18/2023 01:07 PM    NEUTSPOLYS 69.80 05/18/2023 01:07 PM    LYMPHOCYTES 18.50 (L) 05/18/2023 01:07 PM    MONOCYTES 8.50 05/18/2023 01:07 PM    EOSINOPHILS 2.70 05/18/2023 01:07 PM    BASOPHILS 0.30 05/18/2023 01:07 PM    HYPOCHROMIA 1+ 03/25/2011 09:07 AM        Lab Results   Component Value Date/Time    HBA1C 6.0 (H) 03/28/2023 08:14 AM          Imaging:   I personally reviewed following images    I reviewed the fluoroscopic images from 2/28/2023 showing successful left L4-5 and " L5-S1 transforaminal epidural steroid injection.  I reviewed these with the patient at the visit on 3/27/2023.        I reviewed the following radiology reports                         Results for orders placed in visit on 02/16/23    MR-LUMBAR SPINE-W/O    Impression  1.  Degenerative disease in the lumbar spine as described above.  2.  There are combinations of facet joint arthropathy, prominent ligamentum flavum and small synovial cyst causing moderate effacement of the left lateral recess at the level of L4-5. The exiting left L5 nerve root might have been impinged at the lateral  recess.        Results for orders placed in visit on 02/16/23    MR-LUMBAR SPINE-W/O    Impression  1.  Degenerative disease in the lumbar spine as described above.  2.  There are combinations of facet joint arthropathy, prominent ligamentum flavum and small synovial cyst causing moderate effacement of the left lateral recess at the level of L4-5. The exiting left L5 nerve root might have been impinged at the lateral  recess.                                                                                                               Results for orders placed during the hospital encounter of 05/29/04    DX-KNEE COMPLETE 4+    Impression  IMPRESSION:      NEGATIVE RIGHT KNEE SERIES.              READING DR:        MIGDALIA COATS MD  READING DATE:      May 29 2004  6:29PM  REPORT STATUS:     FINAL REPORT    [place]         [facility] [place]:         May 29 2004  9:48PM    THIS DOCUMENT HAS BEEN ELECTRONICALLY SIGNED BY:  MAGAN RIOS MD,  PhD - May 30 2004  7:03AM    Results for orders placed in visit on 08/04/22    DX-LUMBAR SPINE-4+ VIEWS                        DIAGNOSIS   Visit Diagnoses     ICD-10-CM   1. Lumbar radiculopathy  M54.16   2. Lumbar spondylosis  M47.816   3. SI (sacroiliac) joint dysfunction  M53.3   4. Chronic left-sided low back pain with left-sided sciatica  M54.42    G89.29   5. DISH  (diffuse idiopathic skeletal hyperostosis)  M48.10   6. BMI 30-39.9  E66.9   7. Exercise counseling  Z71.82         ASSESSMENT and PLAN:     Arias Nath  1960 male      Arias was seen today for follow-up.    Diagnoses and all orders for this visit:    Lumbar radiculopathy  -     Referral to Physical Medicine Rehab    Lumbar spondylosis    SI (sacroiliac) joint dysfunction    Chronic left-sided low back pain with left-sided sciatica    DISH (diffuse idiopathic skeletal hyperostosis)    BMI 30-39.9    Exercise counseling      The patient is had a recurrence of his lumbar radiculopathy with severe pain in the left low back rating the left buttocks.  Previously had excellent pain relief with greater than 80% pain relief and functional improvement after the last injection for greater than 6 months.    Now the pain is severe.  He has had family events happen with his mother-in-law passing away and has travel across the country and has severe pain with sitting.  We discussed obtaining new imaging however given the patient's symptoms are similar as prior to the previous injection it is likely that is a recurrence of the previous lumbar radiculopathy.  The patient has no red flag signs.  He understands emergency precautions.    We will proceed with an urgent left L4-5 and L5-S1 transforaminal epidural steroid injection.    The risks benefits and alternatives to this procedure were discussed and the patient wishes to proceed with the procedure. Risks include but are not limited to damage to surrounding structures, infection, bleeding, worsening of pain which can be permanent, weakness which can be permanent. Benefits include pain relief, improved function. Alternatives includes not doing the procedure.        Follow up: After the above procedure    Thank you for allowing me to participate in the care of this patient. If you have any questions please not hesitate to contact me.             Please note that this  dictation was created using voice recognition software. I have made every reasonable attempt to correct obvious errors but there may be errors of grammar and content that I may have overlooked prior to finalization of this note.      Maximus Hernández MD  Interventional Spine and Sports Physiatry  Physical Medicine and Rehabilitation  [facility]        Leesa Brewer MD

## 2023-11-01 ENCOUNTER — HOSPITAL ENCOUNTER (OUTPATIENT)
Facility: REHABILITATION | Age: 63
End: 2023-11-01
Attending: PHYSICAL MEDICINE & REHABILITATION | Admitting: PHYSICAL MEDICINE & REHABILITATION
Payer: COMMERCIAL

## 2023-11-01 ENCOUNTER — APPOINTMENT (OUTPATIENT)
Dept: RADIOLOGY | Facility: REHABILITATION | Age: 63
End: 2023-11-01
Attending: PHYSICAL MEDICINE & REHABILITATION
Payer: COMMERCIAL

## 2023-11-01 VITALS
RESPIRATION RATE: 17 BRPM | DIASTOLIC BLOOD PRESSURE: 83 MMHG | SYSTOLIC BLOOD PRESSURE: 121 MMHG | TEMPERATURE: 97.5 F | WEIGHT: 256.39 LBS | OXYGEN SATURATION: 97 % | BODY MASS INDEX: 35.9 KG/M2 | HEART RATE: 91 BPM | HEIGHT: 71 IN

## 2023-11-01 PROCEDURE — 700117 HCHG RX CONTRAST REV CODE 255

## 2023-11-01 PROCEDURE — 64484 NJX AA&/STRD TFRM EPI L/S EA: CPT

## 2023-11-01 PROCEDURE — 700111 HCHG RX REV CODE 636 W/ 250 OVERRIDE (IP): Mod: JZ

## 2023-11-01 PROCEDURE — 64483 NJX AA&/STRD TFRM EPI L/S 1: CPT

## 2023-11-01 RX ORDER — LIDOCAINE HYDROCHLORIDE 10 MG/ML
INJECTION, SOLUTION EPIDURAL; INFILTRATION; INTRACAUDAL; PERINEURAL
Status: COMPLETED
Start: 2023-11-01 | End: 2023-11-01

## 2023-11-01 RX ORDER — DEXAMETHASONE SODIUM PHOSPHATE 10 MG/ML
INJECTION, SOLUTION INTRAMUSCULAR; INTRAVENOUS
Status: COMPLETED
Start: 2023-11-01 | End: 2023-11-01

## 2023-11-01 RX ADMIN — DEXAMETHASONE SODIUM PHOSPHATE 10 MG: 10 INJECTION, SOLUTION INTRAMUSCULAR; INTRAVENOUS at 08:09

## 2023-11-01 RX ADMIN — LIDOCAINE HYDROCHLORIDE 10 ML: 10 INJECTION, SOLUTION EPIDURAL; INFILTRATION; INTRACAUDAL at 08:09

## 2023-11-01 RX ADMIN — IOHEXOL 5 ML: 240 INJECTION, SOLUTION INTRATHECAL; INTRAVASCULAR; INTRAVENOUS; ORAL at 08:09

## 2023-11-01 ASSESSMENT — FIBROSIS 4 INDEX: FIB4 SCORE: 1.07

## 2023-11-01 ASSESSMENT — PAIN DESCRIPTION - PAIN TYPE: TYPE: CHRONIC PAIN

## 2023-11-01 NOTE — INTERVAL H&P NOTE
H&P reviewed. The patient was examined and there are no changes to the H&P     Lungs clear to auscultation bilaterally.  No abdominal tenderness.  Heart regular rate and rhythm.  Vitals reviewed.    Proceed with the originally scheduled procedure.  The risks, benefits and alternatives were discussed.  The patient understands these.    Pre-Op Diagnosis Codes:     * Lumbar radiculopathy [M54.16]  Procedure(s):  LEFT L4-5 and L5-S1 transforaminal epidural steroid injection with fluoroscopic guidance.            Maximus Hernández MD  Physical Medicine and Rehabilitation  Interventional Spine and Sports Physiatry  [facility]

## 2023-11-01 NOTE — PROGRESS NOTES
0800 Dr Hernández in to see patient, discharge instructions given to pt and discussed. pt with no questions at this time.

## 2023-11-01 NOTE — PROGRESS NOTES
0817 received pt from procedure room via ambulation. Vital signs stable, tolerating fluids  0818 Dr Strange in to see patient, meets discharge criteria. Site CDI, ice pack applied.  0821 Pt dc'ed with designated , placed in passengers seat

## 2023-11-01 NOTE — OP REPORT
Date of Service: 11/1/2023      Patient: Arias Nath 1960  male     MRN: [id number]      Physician/s: Maximus Hernández MD     Pre-operative Diagnosis: Lumbar radiculopathy     Post-operative Diagnosis: Lumbar radiculopathy     Procedure: left Lumbar Transforaminal Epidural Steroid  at the L4-5 and L5-S1 levels.      Description of procedure:     The risks, benefits, and alternatives of the procedure were reviewed and discussed with the patient.  Written informed consent was freely obtained. A pre-procedural time-out was conducted by the physician verifying patient’s identity, procedure to be performed, procedure site and side, and allergy verification. Appropriate equipment was determined to be in place for the procedure.            The patient's vital signs were carefully monitored before, throughout, and after the procedure.      In the fluoroscopy suite the patient was placed in a prone position, a pillow placed underneath their umbilicus. The skin was prepped and draped in the usual sterile fashion.      The fluoroscope was placed over the lumbar spine and adjusted into the proper AP/Oblique view to enter the transforaminal space just adjacent to the pedicle at the levels below. The targets for injection were then marked at the left L4-5. A 27g 1.5 inch needle was placed into the marked site, and 1mL of 1% Lidocaine was injected subcutaneously into the epidermal and dermal layers. The needle was removed intact.  A 22g 5 inch spinal needle was then placed and advanced under fluoroscopic guidance in an oblique view towards the epidural space of the levels noted above. The needle position was confirmed to not be past the 6 o'clock position in the AP view and it was in the neuroforamen in the lateral view.          The fluoroscope was was then adjusted over the lumbar spine and adjusted into the proper AP/Oblique view to enter the transforaminal space adjacent to the pedicle at the LEFT  L5-S1. A 27g 1.5 inch  needle was placed into the marked site, and 1mL of 1% Lidocaine was injected subcutaneously into the epidermal and dermal layers. The needle was removed intact.  A 22g 5 inch spinal needle was then placed and advanced under fluoroscopic guidance in an oblique view towards the epidural space of the levels noted above. The needle position was confirmed to not be past the 6 o'clock position in the AP view and it was in the neuroforamen in the lateral view.         Under live fluoroscopic guidance in the AP view, contrast dye was used to highlight the epidural space spread of each level above. Final fluoroscopic images were saved.  Following negative aspiration, 1mL of 1% lidocaine preservative free with 5mg dexamethasone   was then injected at each level above, and the needles were removed intact after restyleted. The patient's back was covered with a 4x4 gauze, the area was cleansed with sterile normal saline, and a dressing was applied. There were no complications noted.      The patient was then evaluated post-procedure, and was hemodynamically stable prior to leaving the post-operative care unit.      This was a challenging procedure given the patients  Body mass index is 35.76 kg/m². .. This required longer needles.  A typical procedure such as this would require 25g 3.5 inch needles.  This procedure required 22g 5 inch needles.  This added to the mental effort for complexity this procedure.  This also made acquiring fluoroscopic images more time-consuming and challenging.  Final fluoroscopic images were obtained and saved.      Follow-up as scheduled     Maximus Hernández MD  Interventional Spine and Pain  Physical Medicine and Rehabilitation  [facility]              CPT codes  Transforaminal epidural injection- lumbar or sacral (first level):  67466-15  Transforaminal epidural injection- lumbar or sacral (each additional level):  02198-83

## 2023-11-13 ENCOUNTER — HOSPITAL ENCOUNTER (OUTPATIENT)
Dept: LAB | Facility: MEDICAL CENTER | Age: 63
End: 2023-11-13
Attending: FAMILY MEDICINE
Payer: COMMERCIAL

## 2023-11-13 DIAGNOSIS — E78.00 HYPERCHOLESTEROLEMIA: ICD-10-CM

## 2023-11-13 DIAGNOSIS — R73.03 PREDIABETES: ICD-10-CM

## 2023-11-13 LAB
ALBUMIN SERPL BCP-MCNC: 4.3 G/DL (ref 3.2–4.9)
ALBUMIN/GLOB SERPL: 1.3 G/DL
ALP SERPL-CCNC: 92 U/L (ref 30–99)
ALT SERPL-CCNC: 36 U/L (ref 2–50)
ANION GAP SERPL CALC-SCNC: 13 MMOL/L (ref 7–16)
AST SERPL-CCNC: 25 U/L (ref 12–45)
BILIRUB SERPL-MCNC: 0.6 MG/DL (ref 0.1–1.5)
BUN SERPL-MCNC: 19 MG/DL (ref 8–22)
CALCIUM ALBUM COR SERPL-MCNC: 9.1 MG/DL (ref 8.5–10.5)
CALCIUM SERPL-MCNC: 9.3 MG/DL (ref 8.5–10.5)
CHLORIDE SERPL-SCNC: 104 MMOL/L (ref 96–112)
CHOLEST SERPL-MCNC: 137 MG/DL (ref 100–199)
CO2 SERPL-SCNC: 25 MMOL/L (ref 20–33)
CREAT SERPL-MCNC: 0.89 MG/DL (ref 0.5–1.4)
EST. AVERAGE GLUCOSE BLD GHB EST-MCNC: 126 MG/DL
FASTING STATUS PATIENT QL REPORTED: NORMAL
GFR SERPLBLD CREATININE-BSD FMLA CKD-EPI: 98 ML/MIN/1.73 M 2
GLOBULIN SER CALC-MCNC: 3.2 G/DL (ref 1.9–3.5)
GLUCOSE SERPL-MCNC: 102 MG/DL (ref 65–99)
HBA1C MFR BLD: 6 % (ref 4–5.6)
HDLC SERPL-MCNC: 47 MG/DL
LDLC SERPL CALC-MCNC: 69 MG/DL
POTASSIUM SERPL-SCNC: 4.2 MMOL/L (ref 3.6–5.5)
PROT SERPL-MCNC: 7.5 G/DL (ref 6–8.2)
SODIUM SERPL-SCNC: 142 MMOL/L (ref 135–145)
TRIGL SERPL-MCNC: 105 MG/DL (ref 0–149)

## 2023-11-13 PROCEDURE — 36415 COLL VENOUS BLD VENIPUNCTURE: CPT

## 2023-11-13 PROCEDURE — 80061 LIPID PANEL: CPT

## 2023-11-13 PROCEDURE — 83036 HEMOGLOBIN GLYCOSYLATED A1C: CPT

## 2023-11-13 PROCEDURE — 80053 COMPREHEN METABOLIC PANEL: CPT

## 2023-11-17 ENCOUNTER — OFFICE VISIT (OUTPATIENT)
Dept: INTERNAL MEDICINE | Facility: IMAGING CENTER | Age: 63
End: 2023-11-17
Payer: COMMERCIAL

## 2023-11-17 VITALS
SYSTOLIC BLOOD PRESSURE: 132 MMHG | TEMPERATURE: 97.6 F | RESPIRATION RATE: 12 BRPM | HEART RATE: 84 BPM | WEIGHT: 252 LBS | OXYGEN SATURATION: 96 % | HEIGHT: 71 IN | DIASTOLIC BLOOD PRESSURE: 80 MMHG | BODY MASS INDEX: 35.28 KG/M2

## 2023-11-17 DIAGNOSIS — R73.03 PREDIABETES: ICD-10-CM

## 2023-11-17 DIAGNOSIS — I10 ESSENTIAL HYPERTENSION: ICD-10-CM

## 2023-11-17 DIAGNOSIS — E78.00 HYPERCHOLESTEROLEMIA: ICD-10-CM

## 2023-11-17 DIAGNOSIS — Z12.11 SCREENING FOR COLON CANCER: ICD-10-CM

## 2023-11-17 PROCEDURE — 99214 OFFICE O/P EST MOD 30 MIN: CPT | Performed by: FAMILY MEDICINE

## 2023-11-17 PROCEDURE — 3079F DIAST BP 80-89 MM HG: CPT | Performed by: FAMILY MEDICINE

## 2023-11-17 PROCEDURE — 3075F SYST BP GE 130 - 139MM HG: CPT | Performed by: FAMILY MEDICINE

## 2023-11-17 RX ORDER — METHYLPREDNISOLONE 4 MG/1
TABLET ORAL
COMMUNITY
Start: 2023-10-30 | End: 2023-11-17

## 2023-11-17 ASSESSMENT — FIBROSIS 4 INDEX: FIB4 SCORE: 1.13

## 2023-11-17 NOTE — ASSESSMENT & PLAN NOTE
Is a chronic health problem for which the patient is on losartan 100 mg daily along with amlodipine 5 mg daily.  His blood pressure today is 132/80.  He is fairly frustrated with not being able to lose weight.  We had a long discussion regarding this.  Were going to make some lifestyle changes and see him back in 6 weeks hopefully with sustained weight loss.

## 2023-11-17 NOTE — ASSESSMENT & PLAN NOTE
This is a chronic health problem for this patient that is very frustrating to him.  He has a bad back and if he does not lose some of his abdominal adiposity he is going to end up continuing to have a bad back.  We talked about putting him on a macrobiotic diet so he is going to start at 150 g of protein, 135 g of carbohydrate and 85 g of fat.  He will keep track on my fitness pal.  He will weight lift 5 days a week and we will plan to see him back in 6 weeks hopefully with about a 10 pound weight loss.  He is starting at 252.

## 2023-11-17 NOTE — ASSESSMENT & PLAN NOTE
This is a chronic health problem that I am hopeful we will see improvement in with him working diligently on weight loss.  His fasting glucose was barely elevated at 102 and his A1c is barely elevated at 6.0.  We will continue to monitor.

## 2023-11-17 NOTE — ASSESSMENT & PLAN NOTE
This is a chronic health problem for this patient for which she is on rosuvastatin 5 mg daily.  He is done a great job with keeping his cholesterol under control.  Total cholesterol is down to 137, triglycerides 105, HDL 47 LDL is 69.  There is no liver dysfunction.  He will continue with current meds.

## 2024-01-11 ENCOUNTER — OFFICE VISIT (OUTPATIENT)
Dept: PHYSICAL MEDICINE AND REHAB | Facility: MEDICAL CENTER | Age: 64
End: 2024-01-11
Payer: COMMERCIAL

## 2024-01-11 VITALS
OXYGEN SATURATION: 95 % | HEART RATE: 77 BPM | TEMPERATURE: 96.8 F | HEIGHT: 71 IN | WEIGHT: 258.8 LBS | BODY MASS INDEX: 36.23 KG/M2 | SYSTOLIC BLOOD PRESSURE: 140 MMHG | DIASTOLIC BLOOD PRESSURE: 70 MMHG

## 2024-01-11 DIAGNOSIS — M53.3 SI (SACROILIAC) JOINT DYSFUNCTION: ICD-10-CM

## 2024-01-11 DIAGNOSIS — M48.10 DISH (DIFFUSE IDIOPATHIC SKELETAL HYPEROSTOSIS): ICD-10-CM

## 2024-01-11 DIAGNOSIS — M54.42 CHRONIC LEFT-SIDED LOW BACK PAIN WITH LEFT-SIDED SCIATICA: ICD-10-CM

## 2024-01-11 DIAGNOSIS — Z71.82 EXERCISE COUNSELING: ICD-10-CM

## 2024-01-11 DIAGNOSIS — M54.16 LUMBAR RADICULOPATHY: ICD-10-CM

## 2024-01-11 DIAGNOSIS — E66.9 OBESITY (BMI 30-39.9): ICD-10-CM

## 2024-01-11 DIAGNOSIS — G89.29 CHRONIC LEFT-SIDED LOW BACK PAIN WITH LEFT-SIDED SCIATICA: ICD-10-CM

## 2024-01-11 DIAGNOSIS — M47.816 LUMBAR SPONDYLOSIS: ICD-10-CM

## 2024-01-11 PROCEDURE — 1126F AMNT PAIN NOTED NONE PRSNT: CPT | Performed by: PHYSICAL MEDICINE & REHABILITATION

## 2024-01-11 PROCEDURE — 99214 OFFICE O/P EST MOD 30 MIN: CPT | Performed by: PHYSICAL MEDICINE & REHABILITATION

## 2024-01-11 PROCEDURE — 3077F SYST BP >= 140 MM HG: CPT | Performed by: PHYSICAL MEDICINE & REHABILITATION

## 2024-01-11 PROCEDURE — 3078F DIAST BP <80 MM HG: CPT | Performed by: PHYSICAL MEDICINE & REHABILITATION

## 2024-01-11 ASSESSMENT — PATIENT HEALTH QUESTIONNAIRE - PHQ9: CLINICAL INTERPRETATION OF PHQ2 SCORE: 0

## 2024-01-11 ASSESSMENT — FIBROSIS 4 INDEX: FIB4 SCORE: 1.14

## 2024-01-11 ASSESSMENT — PAIN SCALES - GENERAL: PAINLEVEL: NO PAIN

## 2024-01-11 NOTE — PROGRESS NOTES
Follow up patient note  Interventional spine and Pain  Physiatry (physical medicine and  Rehabilitation)       Chief complaint:   Chief Complaint   Patient presents with    Back Pain     Post SP epidural Left L4-5          HISTORY    Please see new patient note by Dr Hernández,  for more details.     HPI  Patient identification: Arias Nath , THEO 1960,   With Diagnoses of Lumbar radiculopathy, Lumbar spondylosis, SI (sacroiliac) joint dysfunction, Chronic left-sided low back pain with left-sided sciatica, DISH (diffuse idiopathic skeletal hyperostosis), BMI 30-39.9, and Exercise counseling were pertinent to this visit.     Procedures:  2023 left L4-5 and L5-S1 transforaminal epidural steroid injection Preprocedure pain 8/10 NRS postprocedure pain 1/10 NRS  2023 left L4-5 and L5-S1 transforaminal epidural steroid injection Preprocedure pain 8/10 NRS postprocedure pain 1/10 NRS    He has returned to skiing. He is stretching at night before he goes to bed. This has helped significantly.  Pain is 1 out of 10 in intensity NRS.  Nonradiating.  He has an improvement with ADLs and function.  He denies numbness tingling or weakness.  He is doing his home exercise program from physical therapy.               ROS Red Flags :   Fever, Chills, Sweats: Denies  Involuntary Weight Loss: Denies  Bowel/Bladder Incontinence: Denies  Saddle Anesthesia: Denies        PMHx:   Past Medical History:   Diagnosis Date    Acute left-sided back pain with sciatica 2022    Cough 2018    Essential hypertension 2018    History of malignant melanoma of skin 2022    Found in 10/21 treated with excision by Dr. Mitchell.    Hypercholesterolemia 2019    Hypertension     Olecranon bursitis of left elbow 2018    Vitamin D deficiency 9/3/2019       PSHx:   Past Surgical History:   Procedure Laterality Date    RI NJX AA&/STRD TFRML EPI LUMBAR/SACRAL 1 LEVEL Left 2023    Procedure: LEFT L4-5 and L5-S1  transforaminal epidural steroid injection with fluoroscopic guidance.          Note: 22-5;  Surgeon: Maximus Hernández M.D.;  [facility];  Service: Pain Management    LUMBAR TRANSFORAMINAL EPIDURAL STEROID INJECTION Left 2/28/2023    Procedure: LEFT L4-5 and L5-S1 transforaminal epidural steroid injection with fluoroscopic guidance;  Surgeon: Maximus Hernández M.D.;  Location: [facility];  Service: Pain Management    KNEE ARTHROSCOPY Right 2005       Family history   Family History   Problem Relation Age of Onset    Colon Cancer Mother     Hypertension Mother     Hyperlipidemia Mother     No Known Problems Father          Medications:   Outpatient Medications Marked as Taking for the 1/11/24 encounter (Office Visit) with Maximus Hernández M.D.   Medication Sig Dispense Refill    rosuvastatin (CRESTOR) 5 MG Tab Take 1 Tablet by mouth every evening. 90 Tablet 3    amLODIPine (NORVASC) 5 MG Tab Take 1 Tablet by mouth every day. 90 Tablet 3    acetaminophen (TYLENOL) 500 MG Tab Take 2 Tablets by mouth 3 times a day as needed (pain.  Do not exceed 3000 mg of total acetaminophen per day). 180 Tablet 6    losartan (COZAAR) 100 MG Tab TAKE ONE TABLET BY MOUTH EVERY DAY 90 Tablet 3    Cholecalciferol (VITAMIN D3) 125 MCG (5000 UT) Cap Take 1 Cap by mouth every day. 30 Cap         Current Outpatient Medications on File Prior to Visit   Medication Sig Dispense Refill    rosuvastatin (CRESTOR) 5 MG Tab Take 1 Tablet by mouth every evening. 90 Tablet 3    amLODIPine (NORVASC) 5 MG Tab Take 1 Tablet by mouth every day. 90 Tablet 3    acetaminophen (TYLENOL) 500 MG Tab Take 2 Tablets by mouth 3 times a day as needed (pain.  Do not exceed 3000 mg of total acetaminophen per day). 180 Tablet 6    losartan (COZAAR) 100 MG Tab TAKE ONE TABLET BY MOUTH EVERY DAY 90 Tablet 3    Cholecalciferol (VITAMIN D3) 125 MCG (5000 UT) Cap Take 1 Cap by mouth every day. 30 Cap      No current  "facility-administered medications on file prior to visit.         Allergies:   Allergies   Allergen Reactions    Ace Inhibitors Cough       Social Hx:   Social History     Socioeconomic History    Marital status:      Spouse name: Not on file    Number of children: Not on file    Years of education: Not on file    Highest education level: Not on file   Occupational History    Not on file   Tobacco Use    Smoking status: Never    Smokeless tobacco: Never   Substance and Sexual Activity    Alcohol use: Yes     Alcohol/week: 0.6 oz     Types: 1 Cans of beer per week     Comment: social    Drug use: No    Sexual activity: Yes     Partners: Female     Comment: , govt contractor owner   Other Topics Concern    Not on file   Social History Narrative    Not on file     Social Determinants of Health     Financial Resource Strain: Not on file   Food Insecurity: Not on file   Transportation Needs: Not on file   Physical Activity: Not on file   Stress: Not on file   Social Connections: Not on file   Intimate Partner Violence: Not on file   Housing Stability: Not on file         EXAMINATION     Physical Exam:   Vitals: BP (!) 140/70 (BP Location: Right arm, Patient Position: Sitting, BP Cuff Size: Adult)   Pulse 77   Temp 36 °C (96.8 °F) (Temporal)   Ht 1.803 m (5' 11\")   Wt 117 kg (258 lb 12.8 oz)   SpO2 95%     Constitutional:   Body Habitus: Body mass index is 36.1 kg/m².  Cooperation: Fully cooperates with exam  Appearance: Well-groomed no disheveled    Respiratory-  breathing comfortable on room air, no audible wheezing  Cardiovascular- capillary refills less than 2 seconds. No lower extremity edema is noted.   Psychiatric- alert and oriented ×3. Normal affect.    MSK and Neuro: -      Thoracic/Lumbar Spine/Sacral Spine/Hips   There are no signs of infection around the injection sites.   full  active range of motion with flexion, lateral flexion, and rotation bilaterally.   There is full  active range of " motion with lumbar extension.    There is no  pain with lumbar extension.   There is no pain with facet loading maneuver (extension rotation) with axial low back pain on the BILATERAL side(s)       Palpation:   No tenderness to palpation in midline at T1-T12 levels. No tenderness to palpation in the left and right of the midline T1-L5, NEGATIVE for tenderness to palpation to the para-midline region in the lower lumbar levels.  palpation over SI joint: negative bilaterally    palpation in hip or over the gluteus medius tendon insertion: negative bilaterally      Lumbar spine Special tests  Neuro tension  Straight leg test negative bilaterally    Slump test negative bilaterally      Key points for the international standards for neurological classification of spinal cord injury (ISNCSCI) to light touch.     Dermatome R L                                      L2 2 2   L3 2 2   L4 2 2   L5 2 2   S1 2 2   S2 2 2         Motor Exam Lower Extremities    ? Myotome R L   Hip flexion L2 5 5   Knee extension L3 5 5   Ankle dorsiflexion L4 5 5   Toe extension L5 5 5   Ankle plantarflexion S1 5 5           Thoracic/Lumbar Spine/Sacral Spine/Hips   There are no signs of infection around the injection sites.   full  active range of motion with flexion, lateral flexion, and rotation bilaterally.   There is full  active range of motion with lumbar extension.        Palpation:   No tenderness to palpation in midline at T1-T12 levels. No tenderness to palpation in the left and right of the midline T1-L5, NEGATIVE for tenderness to palpation to the para-midline region in the lower lumbar levels.  palpation over SI joint: negative bilaterally    palpation in hip or over the gluteus medius tendon insertion: negative bilaterally      Lumbar spine Special tests  Neuro tension  Straight leg test negative bilaterally    Slump test negative bilaterally      Key points for the international standards for neurological classification of spinal  "cord injury (ISNCSCI) to light touch.     Dermatome R L                                      L2 2 2   L3 2 2   L4 2 2   L5 2 2   S1 2 2   S2 2 2         Motor Exam Lower Extremities    ? Myotome R L   Hip flexion L2 5 5   Knee extension L3 5 5   Ankle dorsiflexion L4 5 5   Toe extension L5 5 5   Ankle plantarflexion S1 5 5         MEDICAL DECISION MAKING    DATA    Labs:   Lab Results   Component Value Date/Time    SODIUM 142 11/13/2023 07:18 AM    POTASSIUM 4.2 11/13/2023 07:18 AM    CHLORIDE 104 11/13/2023 07:18 AM    CO2 25 11/13/2023 07:18 AM    GLUCOSE 102 (H) 11/13/2023 07:18 AM    BUN 19 11/13/2023 07:18 AM    CREATININE 0.89 11/13/2023 07:18 AM        No results found for: \"PROTHROMBTM\", \"INR\"     Lab Results   Component Value Date/Time    WBC 8.8 05/18/2023 01:07 PM    RBC 5.21 05/18/2023 01:07 PM    HEMOGLOBIN 15.1 05/18/2023 01:07 PM    HEMATOCRIT 45.0 05/18/2023 01:07 PM    MCV 86.4 05/18/2023 01:07 PM    MCH 29.0 05/18/2023 01:07 PM    MCHC 33.6 (L) 05/18/2023 01:07 PM    MPV 8.0 (L) 05/18/2023 01:07 PM    NEUTSPOLYS 69.80 05/18/2023 01:07 PM    LYMPHOCYTES 18.50 (L) 05/18/2023 01:07 PM    MONOCYTES 8.50 05/18/2023 01:07 PM    EOSINOPHILS 2.70 05/18/2023 01:07 PM    BASOPHILS 0.30 05/18/2023 01:07 PM    HYPOCHROMIA 1+ 03/25/2011 09:07 AM        Lab Results   Component Value Date/Time    HBA1C 6.0 (H) 11/13/2023 07:18 AM          Imaging:   I personally reviewed following images    I reviewed the fluoroscopic images from 2/28/2023 showing successful left L4-5 and L5-S1 transforaminal epidural steroid injection.  I reviewed these with the patient at the visit on 3/27/2023.        I reviewed the following radiology reports                         Results for orders placed in visit on 02/16/23    MR-LUMBAR SPINE-W/O    Impression  1.  Degenerative disease in the lumbar spine as described above.  2.  There are combinations of facet joint arthropathy, prominent ligamentum flavum and small synovial cyst " causing moderate effacement of the left lateral recess at the level of L4-5. The exiting left L5 nerve root might have been impinged at the lateral  recess.        Results for orders placed in visit on 23    MR-LUMBAR SPINE-W/O    Impression  1.  Degenerative disease in the lumbar spine as described above.  2.  There are combinations of facet joint arthropathy, prominent ligamentum flavum and small synovial cyst causing moderate effacement of the left lateral recess at the level of L4-5. The exiting left L5 nerve root might have been impinged at the lateral  recess.                                                                                                               Results for orders placed during the hospital encounter of 04    DX-KNEE COMPLETE 4+    Impression  IMPRESSION:      NEGATIVE RIGHT KNEE SERIES.              READING DR:        MIGDALIA COATS MD  READING DATE:      May 29 2004  6:29PM  REPORT STATUS:     FINAL REPORT    TRANSCRIPTION CODE:         [facility] [place]  TRANSCRIPTION DATE:         May 29 2004  9:48PM    THIS DOCUMENT HAS BEEN ELECTRONICALLY SIGNED BY:  MAGAN RIOS MD,  PhD - May 30 2004  7:03AM    Results for orders placed in visit on 22    DX-LUMBAR SPINE-4+ VIEWS                        DIAGNOSIS   Visit Diagnoses     ICD-10-CM   1. Lumbar radiculopathy  M54.16   2. Lumbar spondylosis  M47.816   3. SI (sacroiliac) joint dysfunction  M53.3   4. Chronic left-sided low back pain with left-sided sciatica  M54.42    G89.29   5. DISH (diffuse idiopathic skeletal hyperostosis)  M48.10   6. BMI 30-39.9  E66.9   7. Exercise counseling  Z71.82   Stable, continue home exercise program      ASSESSMENT and PLAN:     Arias Pham VENEGAS 1960 male      Arias was seen today for back pain.    Diagnoses and all orders for this visit:    Lumbar radiculopathy  Comments:  Stable, continue home exercise program  Orders:  -     Referral to Physical Therapy    Lumbar  spondylosis  Comments:  Stable, continue home exercise program  Orders:  -     Referral to Physical Therapy    SI (sacroiliac) joint dysfunction  Comments:  Stable, continue home exercise program  Orders:  -     Referral to Physical Therapy    Chronic left-sided low back pain with left-sided sciatica  Comments:  Stable, continue home exercise program  Orders:  -     Referral to Physical Therapy    DISH (diffuse idiopathic skeletal hyperostosis)  -     Referral to Physical Therapy    BMI 30-39.9    Exercise counseling      Medications: Acetaminophen up to 1000 mg 3 times daily as needed not to exceed 3000 mg per 24-hours.    Follow up:  1 year or sooner as needed    Thank you for allowing me to participate in the care of this patient. If you have any questions please not hesitate to contact me.             Please note that this dictation was created using voice recognition software. I have made every reasonable attempt to correct obvious errors but there may be errors of grammar and content that I may have overlooked prior to finalization of this note.      Maximus Hernández MD  Interventional Spine and Sports Physiatry  Physical Medicine and Rehabilitation  [facility]        Leesa Brewer MD

## 2024-02-02 ENCOUNTER — OFFICE VISIT (OUTPATIENT)
Dept: INTERNAL MEDICINE | Facility: IMAGING CENTER | Age: 64
End: 2024-02-02
Payer: COMMERCIAL

## 2024-02-02 VITALS
DIASTOLIC BLOOD PRESSURE: 70 MMHG | RESPIRATION RATE: 14 BRPM | BODY MASS INDEX: 35.01 KG/M2 | SYSTOLIC BLOOD PRESSURE: 122 MMHG | HEART RATE: 72 BPM | OXYGEN SATURATION: 95 % | WEIGHT: 251 LBS | TEMPERATURE: 97.7 F

## 2024-02-02 DIAGNOSIS — R73.03 PREDIABETES: ICD-10-CM

## 2024-02-02 DIAGNOSIS — I10 ESSENTIAL HYPERTENSION: ICD-10-CM

## 2024-02-02 DIAGNOSIS — E66.01 MORBID (SEVERE) OBESITY DUE TO EXCESS CALORIES (HCC): ICD-10-CM

## 2024-02-02 DIAGNOSIS — E78.00 HYPERCHOLESTEROLEMIA: ICD-10-CM

## 2024-02-02 PROCEDURE — 99214 OFFICE O/P EST MOD 30 MIN: CPT | Performed by: FAMILY MEDICINE

## 2024-02-02 PROCEDURE — 3074F SYST BP LT 130 MM HG: CPT | Performed by: FAMILY MEDICINE

## 2024-02-02 PROCEDURE — 3078F DIAST BP <80 MM HG: CPT | Performed by: FAMILY MEDICINE

## 2024-02-02 RX ORDER — OMEPRAZOLE 20 MG/1
20 CAPSULE, DELAYED RELEASE ORAL DAILY
COMMUNITY

## 2024-02-02 ASSESSMENT — FIBROSIS 4 INDEX: FIB4 SCORE: 1.14

## 2024-02-02 NOTE — ASSESSMENT & PLAN NOTE
This is a chronic health problem for this patient for which she is on losartan 100 mg daily and amlodipine 5 mg daily.  He has done very well with those medications and his blood pressure is excellent today 122/70.  Patient is doing some weight training as well as ski racing and skiing regularly.  He denies chest pain, shortness of breath or dyspnea on exertion.

## 2024-02-02 NOTE — ASSESSMENT & PLAN NOTE
This is a chronic health problem for this patient that has been stable but patient is getting ready to work on a weight loss program and my hopeful we can get him out of the prediabetes stage.  Will plan to see him in June with lab work prior

## 2024-02-02 NOTE — ASSESSMENT & PLAN NOTE
This is a chronic health problem that has been very frustrating to the patient because he has basically been stuck at his current weight.  He has tried multiple weight loss programs without great success.  I am going to put in a referral for him to be seen by one of the family practice docs that also works on weight management and then we will plan to see him back in June with lab work prior to his visit.

## 2024-02-02 NOTE — ASSESSMENT & PLAN NOTE
This is a chronic health problem for this patient that has been very well-controlled on rosuvastatin 5 mg daily.  Before we see him back we will go ahead and get a set of labs and then go over those when we see him in [place]

## 2024-02-02 NOTE — PROGRESS NOTES
HCC Gap Form    Diagnosis: E66.01 - Morbid (severe) obesity due to excess calories (HCC)  Z68.35 - Body mass index (BMI) 35.0-35.9, adult  Comorbidity Diagnosis: Essential hypertension  The current BMI is 35.01 kg/m2 as of 02/02/24 15:35 PST  Assessment and plan: Chronic, stable. Encouraged healthy diet and physical activity changes with a goal of weight loss. Follow up at least annually. The comorbid condition is unchanged based on today's assessment. Treatment plan as follows: I am referring the patient to Dr. Cunningham.To work on weight loss and then we will see the patient back in June 2024.    Last edited 02/02/24 15:37 PST by Leesa Brewer M.D.         Assessment & Plan      63 y.o. malewith the following issues:  Morbid (severe) obesity due to excess calories (HCC)  This is a chronic health problem that has been very frustrating to the patient because he has basically been stuck at his current weight.  He has tried multiple weight loss programs without great success.  I am going to put in a referral for him to be seen by one of the family practice docs that also works on weight management and then we will plan to see him back in June with lab work prior to his visit.    Essential hypertension  This is a chronic health problem for this patient for which she is on losartan 100 mg daily and amlodipine 5 mg daily.  He has done very well with those medications and his blood pressure is excellent today 122/70.  Patient is doing some weight training as well as ski racing and skiing regularly.  He denies chest pain, shortness of breath or dyspnea on exertion.    Prediabetes  This is a chronic health problem for this patient that has been stable but patient is getting ready to work on a weight loss program and my hopeful we can get him out of the prediabetes stage.  Will plan to see him in June with lab work prior    Hypercholesterolemia  This is a chronic health problem for this patient that has been very  well-controlled on rosuvastatin 5 mg daily.  Before we see him back we will go ahead and get a set of labs and then go over those when we see him in       CC: Diagnoses of Morbid (severe) obesity due to excess calories (HCC), Essential hypertension, Prediabetes, and Hypercholesterolemia were pertinent to this visit.    Subjective                                                                                                                                       HPI:   Arias presents today to discuss the followin. Morbid (severe) obesity due to excess calories (HCC)  Chronic problem uncontrolled.    2. Essential hypertension  Chronic health problem, well controlled, continue with current meds and lifestyle.      3. Prediabetes  Chronic health problem, well controlled, continue with current meds and lifestyle.      4. Hypercholesterolemia  Chronic health problem, well controlled, continue with current meds and lifestyle.         Patient Active Problem List    Diagnosis Date Noted    Morbid (severe) obesity due to excess calories (HCC) 2023    Acute bilateral low back pain without sciatica 2022    Acute left-sided back pain with sciatica 2022    Prediabetes 2022    History of malignant melanoma of skin 2022    Hypercholesterolemia 2019    Vitamin D deficiency 2019    Wellness examination 2019    Olecranon bursitis of left elbow 2018    Essential hypertension 2018    Body mass index (BMI) 35.0-35.9, adult        Current Outpatient Medications   Medication Sig Dispense Refill    omeprazole (PRILOSEC) 20 MG delayed-release capsule Take 20 mg by mouth every day.      rosuvastatin (CRESTOR) 5 MG Tab Take 1 Tablet by mouth every evening. 90 Tablet 3    amLODIPine (NORVASC) 5 MG Tab Take 1 Tablet by mouth every day. 90 Tablet 3    acetaminophen (TYLENOL) 500 MG Tab Take 2 Tablets by mouth 3 times a day as needed (pain.  Do not exceed 3000 mg of total  acetaminophen per day). 180 Tablet 6    losartan (COZAAR) 100 MG Tab TAKE ONE TABLET BY MOUTH EVERY DAY 90 Tablet 3    Cholecalciferol (VITAMIN D3) 125 MCG (5000 UT) Cap Take 1 Cap by mouth every day. 30 Cap      No current facility-administered medications for this visit.         Allergies as of 02/02/2024 - Reviewed 02/02/2024   Allergen Reaction Noted    Ace inhibitors Cough 08/02/2019          Objective      /70 (BP Location: Left arm, Patient Position: Sitting, BP Cuff Size: Large adult)   Pulse 72   Temp 36.5 °C (97.7 °F) (Temporal)   Resp 14   Wt 114 kg (251 lb)   SpO2 95%   BMI 35.01 kg/m²     Physical Exam:  Gen:         Alert and oriented, No apparent distress.  Neck:        No Lymphadenopathy or Bruits.  Lungs:     Clear to auscultation bilaterally  CV:          Regular rate and rhythm. No murmurs, rubs or gallops.               Ext:          No clubbing, cyanosis, edema.

## 2024-02-08 ENCOUNTER — OFFICE VISIT (OUTPATIENT)
Dept: MEDICAL GROUP | Facility: LAB | Age: 64
End: 2024-02-08
Payer: COMMERCIAL

## 2024-02-08 VITALS
OXYGEN SATURATION: 94 % | TEMPERATURE: 97 F | WEIGHT: 260 LBS | HEART RATE: 72 BPM | SYSTOLIC BLOOD PRESSURE: 128 MMHG | RESPIRATION RATE: 14 BRPM | BODY MASS INDEX: 36.4 KG/M2 | HEIGHT: 71 IN | DIASTOLIC BLOOD PRESSURE: 68 MMHG

## 2024-02-08 DIAGNOSIS — E66.9 OBESITY (BMI 30-39.9): ICD-10-CM

## 2024-02-08 PROCEDURE — 3074F SYST BP LT 130 MM HG: CPT | Performed by: STUDENT IN AN ORGANIZED HEALTH CARE EDUCATION/TRAINING PROGRAM

## 2024-02-08 PROCEDURE — 3078F DIAST BP <80 MM HG: CPT | Performed by: STUDENT IN AN ORGANIZED HEALTH CARE EDUCATION/TRAINING PROGRAM

## 2024-02-08 PROCEDURE — 99204 OFFICE O/P NEW MOD 45 MIN: CPT | Performed by: STUDENT IN AN ORGANIZED HEALTH CARE EDUCATION/TRAINING PROGRAM

## 2024-02-08 ASSESSMENT — ENCOUNTER SYMPTOMS
SHORTNESS OF BREATH: 0
CHILLS: 0
FEVER: 0

## 2024-02-08 ASSESSMENT — FIBROSIS 4 INDEX: FIB4 SCORE: 1.14

## 2024-02-08 NOTE — ASSESSMENT & PLAN NOTE
Chronic-not completely controlled  - Discussed with patient to prioritize weightbearing exercises, discussed cardio after weight lifting  - Discussed with patient to focus on unprocessed foods, all along with low glycemic index foods  -GLP injectables were discussed, patient is open to the idea.  We will consider this at next visit depending on how he does with lifestyle recommendations

## 2024-02-08 NOTE — PROGRESS NOTES
Subjective:     CC:  The encounter diagnosis was Obesity (BMI 30-39.9).    HISTORY OF THE PRESENT ILLNESS: Patient is a 63 y.o. male with the following medical problems   Past Medical History:   Diagnosis Date    Acute left-sided back pain with sciatica 11/4/2022    Cough 5/11/2018    Essential hypertension 5/11/2018    History of malignant melanoma of skin 1/7/2022    Found in 10/21 treated with excision by Dr. Mitchell.    Hypercholesterolemia 11/6/2019    Hypertension     Olecranon bursitis of left elbow 6/27/2018    Vitamin D deficiency 9/3/2019     . This pleasant patient is here today to establish care and discuss the following;    Problem   Obesity (Bmi 30-39.9)    Obesity co-morbid conditions with GERD, prediabetes, HLD    -patient gained weight mostly during college, over the past 10 years   -patient was down to 244lbs but gained weight after visiting his daughter.   -he does not consume alcohol regularly     24 hour: 1700 calories/day  -breakfest: [employer] bar,   Lunch: turkey sandwich, turkey slices, grapes,  Dinner: chicken vegetables with one serving of rice, carb smart ice cream bars     Goes out to eat once a week. But when he is traveling he does not adhere to his normal calorie restriction. This occurs once/month     Exercise: winter ski's 3-4x/week and lifts weights 4-5x/week. Summer: golf, hiking, walking.          Current Outpatient Medications Ordered in Epic   Medication Sig Dispense Refill    omeprazole (PRILOSEC) 20 MG delayed-release capsule Take 20 mg by mouth every day.      rosuvastatin (CRESTOR) 5 MG Tab Take 1 Tablet by mouth every evening. 90 Tablet 3    amLODIPine (NORVASC) 5 MG Tab Take 1 Tablet by mouth every day. 90 Tablet 3    acetaminophen (TYLENOL) 500 MG Tab Take 2 Tablets by mouth 3 times a day as needed (pain.  Do not exceed 3000 mg of total acetaminophen per day). 180 Tablet 6    losartan (COZAAR) 100 MG Tab TAKE ONE TABLET BY MOUTH EVERY DAY 90 Tablet 3     "Cholecalciferol (VITAMIN D3) 125 MCG (5000 UT) Cap Take 1 Cap by mouth every day. 30 Cap      No current Epic-ordered facility-administered medications on file.         ROS:   Review of Systems   Constitutional:  Negative for chills and fever.   Respiratory:  Negative for shortness of breath.    Cardiovascular:  Negative for chest pain.         Objective:     Exam: /68 (BP Location: Right arm, Patient Position: Sitting, BP Cuff Size: Adult)   Pulse 72   Temp 36.1 °C (97 °F)   Resp 14   Ht 1.803 m (5' 11\")   Wt 118 kg (260 lb)   SpO2 94%  Body mass index is 36.26 kg/m².    Physical Exam  Constitutional:       General: He is not in acute distress.     Appearance: He is not ill-appearing.   Pulmonary:      Effort: Pulmonary effort is normal.   Neurological:      Mental Status: He is alert.   Psychiatric:         Mood and Affect: Mood normal.         Behavior: Behavior normal.         Thought Content: Thought content normal.         Judgment: Judgment normal.               Assessment & Plan:     Problem List Items Addressed This Visit       Obesity (BMI 30-39.9)     Chronic-not completely controlled  - Discussed with patient to prioritize weightbearing exercises, discussed cardio after weight lifting  - Discussed with patient to focus on unprocessed foods, all along with low glycemic index foods  -GLP injectables were discussed, patient is open to the idea.  We will consider this at next visit depending on how he does with lifestyle recommendations          46 minutes for chart review, patient interaction/counseling, and documentation of this encounter    2 month weight follow up     Please note that this dictation was created using voice recognition software. I have made every reasonable attempt to correct obvious errors, but I expect that there are errors of grammar and possibly content that I did not discover before finalizing the note.  "

## 2024-04-09 ENCOUNTER — OFFICE VISIT (OUTPATIENT)
Dept: MEDICAL GROUP | Facility: LAB | Age: 64
End: 2024-04-09
Payer: COMMERCIAL

## 2024-04-09 VITALS
TEMPERATURE: 97.2 F | SYSTOLIC BLOOD PRESSURE: 122 MMHG | OXYGEN SATURATION: 92 % | DIASTOLIC BLOOD PRESSURE: 64 MMHG | HEIGHT: 71 IN | WEIGHT: 259.8 LBS | RESPIRATION RATE: 14 BRPM | HEART RATE: 66 BPM | BODY MASS INDEX: 36.37 KG/M2

## 2024-04-09 DIAGNOSIS — E88.810 METABOLIC SYNDROME: ICD-10-CM

## 2024-04-09 DIAGNOSIS — E66.9 OBESITY (BMI 30-39.9): ICD-10-CM

## 2024-04-09 DIAGNOSIS — R73.03 PREDIABETES: ICD-10-CM

## 2024-04-09 PROCEDURE — 99214 OFFICE O/P EST MOD 30 MIN: CPT | Performed by: STUDENT IN AN ORGANIZED HEALTH CARE EDUCATION/TRAINING PROGRAM

## 2024-04-09 PROCEDURE — 3078F DIAST BP <80 MM HG: CPT | Performed by: STUDENT IN AN ORGANIZED HEALTH CARE EDUCATION/TRAINING PROGRAM

## 2024-04-09 PROCEDURE — 3074F SYST BP LT 130 MM HG: CPT | Performed by: STUDENT IN AN ORGANIZED HEALTH CARE EDUCATION/TRAINING PROGRAM

## 2024-04-09 RX ORDER — METFORMIN HYDROCHLORIDE 500 MG/1
500 TABLET, EXTENDED RELEASE ORAL DAILY
Qty: 30 TABLET | Refills: 1 | Status: SHIPPED | OUTPATIENT
Start: 2024-04-09

## 2024-04-09 ASSESSMENT — FIBROSIS 4 INDEX: FIB4 SCORE: 1.14

## 2024-04-09 NOTE — ASSESSMENT & PLAN NOTE
Chronic  -recommended prebiotics   -will start low dose metformin to decrease insulin resistance.  Medication along with side effects discussed with patient.  -may need to increase metformin after next A1c if patient still in prediabetes range  -positive lifestyle modifications discussed and advised to continue   L/m to schedule apt for mammo

## 2024-04-09 NOTE — PROGRESS NOTES
Subjective:     CC: Follow-up    HPI:   Arias presents today for the following;    Problem   Metabolic Syndrome    History of hypertension, abdominal obesity, and elevated fasting blood glucose in the past     Obesity (Bmi 30-39.9)    Obesity co-morbid conditions with GERD, prediabetes, HLD    -patient gained weight mostly during college, over the past 10 years   -patient was down to 244lbs but gained weight after visiting his daughter.   -he does not consume alcohol regularly     24 hour: 1700 calories/day  -breakfest: [employer] bar,   Lunch: turkey sandwich, turkey slices, grapes,  Dinner: chicken vegetables with one serving of rice, carb smart ice cream bars     Goes out to eat once a week. But when he is traveling he does not adhere to his normal calorie restriction. This occurs once/month     Exercise: winter ski's 3-4x/week and lifts weights 4-5x/week. Summer: golf, hiking, walking.     4/9/24  -patient has weightbearing exercises, discussed cardio after weight lifting   -he has switched to low glycemic foods  -he has lost roughly 1-2 pounds compared to previously          Current Outpatient Medications Ordered in Epic   Medication Sig Dispense Refill    metFORMIN ER (GLUCOPHAGE XR) 500 MG TABLET SR 24 HR Take 1 Tablet by mouth every day. 30 Tablet 1    omeprazole (PRILOSEC) 20 MG delayed-release capsule Take 20 mg by mouth every day.      rosuvastatin (CRESTOR) 5 MG Tab Take 1 Tablet by mouth every evening. 90 Tablet 3    amLODIPine (NORVASC) 5 MG Tab Take 1 Tablet by mouth every day. 90 Tablet 3    acetaminophen (TYLENOL) 500 MG Tab Take 2 Tablets by mouth 3 times a day as needed (pain.  Do not exceed 3000 mg of total acetaminophen per day). 180 Tablet 6    losartan (COZAAR) 100 MG Tab TAKE ONE TABLET BY MOUTH EVERY DAY 90 Tablet 3    Cholecalciferol (VITAMIN D3) 125 MCG (5000 UT) Cap Take 1 Cap by mouth every day. 30 Cap      No current Epic-ordered facility-administered medications on file.  "          ROS:  ROS    Objective:     Exam:  /64 (BP Location: Right arm, Patient Position: Sitting, BP Cuff Size: Adult)   Pulse 66   Temp 36.2 °C (97.2 °F) (Temporal)   Resp 14   Ht 1.803 m (5' 11\")   Wt 118 kg (259 lb 12.8 oz)   SpO2 92%   BMI 36.23 kg/m²  Body mass index is 36.23 kg/m².    Physical Exam          Assessment & Plan:     Problem List Items Addressed This Visit       Metabolic syndrome     Chronic         Obesity (BMI 30-39.9)     Chronic  -recommended prebiotics   -will start low dose metformin to decrease insulin resistance.  Medication along with side effects discussed with patient.  -may need to increase metformin after next A1c if patient still in prediabetes range  -positive lifestyle modifications discussed and advised to continue         Relevant Medications    metFORMIN ER (GLUCOPHAGE XR) 500 MG TABLET SR 24 HR    Prediabetes    Relevant Medications    metFORMIN ER (GLUCOPHAGE XR) 500 MG TABLET SR 24 HR         3-month follow-up    Please note that this dictation was created using voice recognition software. I have made every reasonable attempt to correct obvious errors, but I expect that there are errors of grammar and possibly content that I did not discover before finalizing the note.        "

## 2024-04-30 ENCOUNTER — APPOINTMENT (RX ONLY)
Dept: URBAN - METROPOLITAN AREA CLINIC 4 | Facility: CLINIC | Age: 64
Setting detail: DERMATOLOGY
End: 2024-04-30

## 2024-04-30 DIAGNOSIS — L81.4 OTHER MELANIN HYPERPIGMENTATION: ICD-10-CM

## 2024-04-30 DIAGNOSIS — L82.1 OTHER SEBORRHEIC KERATOSIS: ICD-10-CM

## 2024-04-30 DIAGNOSIS — D22 MELANOCYTIC NEVI: ICD-10-CM

## 2024-04-30 DIAGNOSIS — D18.0 HEMANGIOMA: ICD-10-CM

## 2024-04-30 DIAGNOSIS — Z85.820 PERSONAL HISTORY OF MALIGNANT MELANOMA OF SKIN: ICD-10-CM

## 2024-04-30 DIAGNOSIS — L57.8 OTHER SKIN CHANGES DUE TO CHRONIC EXPOSURE TO NONIONIZING RADIATION: ICD-10-CM

## 2024-04-30 PROBLEM — D18.01 HEMANGIOMA OF SKIN AND SUBCUTANEOUS TISSUE: Status: ACTIVE | Noted: 2024-04-30

## 2024-04-30 PROBLEM — D22.5 MELANOCYTIC NEVI OF TRUNK: Status: ACTIVE | Noted: 2024-04-30

## 2024-04-30 PROCEDURE — ? COUNSELING

## 2024-04-30 PROCEDURE — 99213 OFFICE O/P EST LOW 20 MIN: CPT

## 2024-04-30 ASSESSMENT — LOCATION DETAILED DESCRIPTION DERM
LOCATION DETAILED: RIGHT DORSAL HAND
LOCATION DETAILED: LEFT DORSAL HAND
LOCATION DETAILED: LEFT UPPER BACK
LOCATION DETAILED: RIGHT ANTERIOR THIGH
LOCATION DETAILED: RIGHT DORSAL FOREARM
LOCATION DETAILED: RIGHT UPPER BACK
LOCATION DETAILED: RIGHT CHEEK
LOCATION DETAILED: LEFT DORSAL FOREARM
LOCATION DETAILED: LEFT CHEEK
LOCATION DETAILED: LEFT ANTERIOR THIGH

## 2024-04-30 ASSESSMENT — LOCATION ZONE DERM
LOCATION ZONE: HAND
LOCATION ZONE: ARM
LOCATION ZONE: LEG
LOCATION ZONE: FACE
LOCATION ZONE: TRUNK

## 2024-04-30 ASSESSMENT — LOCATION SIMPLE DESCRIPTION DERM
LOCATION SIMPLE: BACK
LOCATION SIMPLE: LEFT CHEEK
LOCATION SIMPLE: RIGHT CHEEK
LOCATION SIMPLE: RIGHT UPPER EXTREMITY
LOCATION SIMPLE: LEFT UPPER EXTREMITY
LOCATION SIMPLE: LEFT LOWER EXTREMITY
LOCATION SIMPLE: RIGHT LOWER EXTREMITY
LOCATION SIMPLE: RIGHT HAND
LOCATION SIMPLE: LEFT HAND

## 2024-05-03 RX ORDER — LOSARTAN POTASSIUM 100 MG/1
100 TABLET ORAL
Qty: 90 TABLET | Refills: 3 | Status: SHIPPED | OUTPATIENT
Start: 2024-05-03

## 2024-06-03 ENCOUNTER — HOSPITAL ENCOUNTER (OUTPATIENT)
Dept: LAB | Facility: MEDICAL CENTER | Age: 64
End: 2024-06-03
Attending: FAMILY MEDICINE
Payer: COMMERCIAL

## 2024-06-03 DIAGNOSIS — R73.03 PREDIABETES: ICD-10-CM

## 2024-06-03 DIAGNOSIS — E78.00 HYPERCHOLESTEROLEMIA: ICD-10-CM

## 2024-06-03 LAB
ALBUMIN SERPL BCP-MCNC: 4.2 G/DL (ref 3.2–4.9)
ALBUMIN/GLOB SERPL: 1.4 G/DL
ALP SERPL-CCNC: 92 U/L (ref 30–99)
ALT SERPL-CCNC: 33 U/L (ref 2–50)
ANION GAP SERPL CALC-SCNC: 11 MMOL/L (ref 7–16)
AST SERPL-CCNC: 30 U/L (ref 12–45)
BILIRUB SERPL-MCNC: 0.5 MG/DL (ref 0.1–1.5)
BUN SERPL-MCNC: 16 MG/DL (ref 8–22)
CALCIUM ALBUM COR SERPL-MCNC: 9.1 MG/DL (ref 8.5–10.5)
CALCIUM SERPL-MCNC: 9.3 MG/DL (ref 8.5–10.5)
CHLORIDE SERPL-SCNC: 105 MMOL/L (ref 96–112)
CHOLEST SERPL-MCNC: 123 MG/DL (ref 100–199)
CO2 SERPL-SCNC: 24 MMOL/L (ref 20–33)
CREAT SERPL-MCNC: 0.82 MG/DL (ref 0.5–1.4)
EST. AVERAGE GLUCOSE BLD GHB EST-MCNC: 126 MG/DL
GFR SERPLBLD CREATININE-BSD FMLA CKD-EPI: 100 ML/MIN/1.73 M 2
GLOBULIN SER CALC-MCNC: 3 G/DL (ref 1.9–3.5)
GLUCOSE SERPL-MCNC: 100 MG/DL (ref 65–99)
HBA1C MFR BLD: 6 % (ref 4–5.6)
HDLC SERPL-MCNC: 41 MG/DL
LDLC SERPL CALC-MCNC: 67 MG/DL
POTASSIUM SERPL-SCNC: 4.5 MMOL/L (ref 3.6–5.5)
PROT SERPL-MCNC: 7.2 G/DL (ref 6–8.2)
SODIUM SERPL-SCNC: 140 MMOL/L (ref 135–145)
TRIGL SERPL-MCNC: 76 MG/DL (ref 0–149)

## 2024-06-03 PROCEDURE — 36415 COLL VENOUS BLD VENIPUNCTURE: CPT

## 2024-06-03 PROCEDURE — 80053 COMPREHEN METABOLIC PANEL: CPT

## 2024-06-03 PROCEDURE — 80061 LIPID PANEL: CPT

## 2024-06-03 PROCEDURE — 83036 HEMOGLOBIN GLYCOSYLATED A1C: CPT

## 2024-06-07 ENCOUNTER — OFFICE VISIT (OUTPATIENT)
Dept: INTERNAL MEDICINE | Facility: IMAGING CENTER | Age: 64
End: 2024-06-07
Payer: COMMERCIAL

## 2024-06-07 VITALS
HEART RATE: 79 BPM | BODY MASS INDEX: 35.14 KG/M2 | TEMPERATURE: 98.1 F | DIASTOLIC BLOOD PRESSURE: 74 MMHG | HEIGHT: 71 IN | OXYGEN SATURATION: 94 % | WEIGHT: 251 LBS | RESPIRATION RATE: 14 BRPM | SYSTOLIC BLOOD PRESSURE: 128 MMHG

## 2024-06-07 DIAGNOSIS — I10 ESSENTIAL HYPERTENSION: ICD-10-CM

## 2024-06-07 DIAGNOSIS — E78.00 HYPERCHOLESTEROLEMIA: ICD-10-CM

## 2024-06-07 DIAGNOSIS — E66.9 OBESITY (BMI 30-39.9): ICD-10-CM

## 2024-06-07 DIAGNOSIS — Z11.4 SCREENING FOR HIV (HUMAN IMMUNODEFICIENCY VIRUS): ICD-10-CM

## 2024-06-07 DIAGNOSIS — R73.03 PREDIABETES: ICD-10-CM

## 2024-06-07 PROCEDURE — 3074F SYST BP LT 130 MM HG: CPT | Performed by: FAMILY MEDICINE

## 2024-06-07 PROCEDURE — 99214 OFFICE O/P EST MOD 30 MIN: CPT | Performed by: FAMILY MEDICINE

## 2024-06-07 PROCEDURE — 1126F AMNT PAIN NOTED NONE PRSNT: CPT | Performed by: FAMILY MEDICINE

## 2024-06-07 PROCEDURE — 3078F DIAST BP <80 MM HG: CPT | Performed by: FAMILY MEDICINE

## 2024-06-07 RX ORDER — METFORMIN HYDROCHLORIDE 500 MG/1
500 TABLET, EXTENDED RELEASE ORAL DAILY
Qty: 30 TABLET | Refills: 1 | Status: SHIPPED | OUTPATIENT
Start: 2024-06-07

## 2024-06-07 ASSESSMENT — FIBROSIS 4 INDEX: FIB4 SCORE: 1.43

## 2024-06-07 ASSESSMENT — PAIN SCALES - GENERAL: PAINLEVEL: NO PAIN

## 2024-06-07 NOTE — PROGRESS NOTES
Verbal consent was acquired by the patient to use [employer] ambient listening note generation during this visit yes    Assessment & Plan:  Assessment & Plan  1. Prediabetes.  The patient's chronic health issues are well-managed with lifestyle management and metformin  mg daily. The continuation of metformin  mg daily is advised, and a prescription has been renewed.    2. Obesity.  The patient's condition is a chronic condition, showing improvement with dietary choices. The patient is advised to maintain this long-term management plan.    3. Hypercholesterolemia.  The chronic health issues are well-managed with current medications. The patient will persist with rosuvastatin, as there are no signs of liver dysfunction. A recheck is scheduled for 3 months from now.    4. Essential hypertension.  The patient's chronic health issues are well-managed with the current medication regimen. The patient is advised to continue with the current medication regimen and maintain regular daily activity.    Follow-up  A follow-up appointment is scheduled for 3 months from now.        Subjective:     HPI:   History of Present Illness  The patient is a long-term patient who we are seeing today for his chronic health problems which include prediabetes, obesity, hypercholesterolemia, essential hypertension and he did blood work prior to the visit and we will go over that with him today.    The patient has been diligently engaged in weight loss efforts to manage his prediabetes and obesity. His wife, who is currently on a GLP-2 inhibitor, has reduced her food intake, which has resulted in a total weight loss for this patient of 10 pounds.  He has noted with his wife not having much appetite she is not cooking as because meal and there are not as many junk food treats in the house.  The patient's fasting glucose has decreased to 100 and his A1c remains stable at 6.0. The patient is currently on a daily regimen of metformin  "500 mg extended release.    The patient's hypercholesterolemia is a chronic condition for which he is prescribed rosuvastatin 5 mg daily. He has demonstrated an excellent job in managing his weight loss and medication. His total cholesterol has decreased to 123, triglycerides are 76, HDL is 41, and LDL is down to 67.    The patient is currently on losartan 100 mg daily and amlodipine 5 mg daily for hypertension. Both medications have been effective in managing his blood pressure at 128/74. He denies experiencing chest pain, shortness of breath, or dyspnea on exertion.    Health Maintenance: Completed    Objective:     Exam:  /74 (BP Location: Left arm, Patient Position: Sitting, BP Cuff Size: Adult)   Pulse 79   Temp 36.7 °C (98.1 °F) (Temporal)   Resp 14   Ht 1.803 m (5' 11\")   Wt 114 kg (251 lb)   SpO2 94%   BMI 35.01 kg/m²  Body mass index is 35.01 kg/m².    General:  Well nourished, well developed male in NAD  Head is grossly normal.  Neck: Supple without JVD or bruit. Thyroid is not enlarged.  Pulmonary: Clear to ausculation and percussion.  Normal effort. No rales, ronchi, or wheezing.  Cardiovascular: Regular rate and rhythm without murmur. Carotid and radial pulses are intact and equal bilaterally.  Extremities: no clubbing, cyanosis, or edema.    Results  Laboratory Studies: 6/3/24  Fasting glucose is 100. A1c is 6.0. Total cholesterol is 123, triglycerides are 76, HDL is 41, LDL is down to 67. GFR is normal at 100.                ORDERS     1. Prediabetes    - metFORMIN ER (GLUCOPHAGE XR) 500 MG TABLET SR 24 HR; Take 1 Tablet by mouth every day.  Dispense: 30 Tablet; Refill: 1  - HEMOGLOBIN A1C; Future    2. Obesity (BMI 30-39.9)    - metFORMIN ER (GLUCOPHAGE XR) 500 MG TABLET SR 24 HR; Take 1 Tablet by mouth every day.  Dispense: 30 Tablet; Refill: 1    3. Hypercholesterolemia    - Lipid Profile; Future  - Comp Metabolic Panel; Future    4. Essential hypertension      5. Screening for HIV " (human immunodeficiency virus)    - HIV AG/AB COMBO ASSAY SCREENING; Future            Please note that this dictation was created using voice recognition software. I have made every reasonable attempt to correct obvious errors, but I expect that there are errors of grammar and possibly content that I did not discover before finalizing the note.

## 2024-07-09 RX ORDER — AMLODIPINE BESYLATE 5 MG/1
5 TABLET ORAL
Qty: 90 TABLET | Refills: 3 | Status: SHIPPED | OUTPATIENT
Start: 2024-07-09

## 2024-07-17 ENCOUNTER — OFFICE VISIT (OUTPATIENT)
Dept: MEDICAL GROUP | Facility: LAB | Age: 64
End: 2024-07-17
Payer: COMMERCIAL

## 2024-07-17 VITALS
RESPIRATION RATE: 14 BRPM | TEMPERATURE: 97.6 F | BODY MASS INDEX: 34.19 KG/M2 | SYSTOLIC BLOOD PRESSURE: 128 MMHG | OXYGEN SATURATION: 94 % | DIASTOLIC BLOOD PRESSURE: 68 MMHG | HEART RATE: 76 BPM | WEIGHT: 244.2 LBS | HEIGHT: 71 IN

## 2024-07-17 DIAGNOSIS — R73.03 PREDIABETES: ICD-10-CM

## 2024-07-17 DIAGNOSIS — E66.9 OBESITY (BMI 30-39.9): ICD-10-CM

## 2024-07-17 PROCEDURE — 99214 OFFICE O/P EST MOD 30 MIN: CPT | Performed by: STUDENT IN AN ORGANIZED HEALTH CARE EDUCATION/TRAINING PROGRAM

## 2024-07-17 PROCEDURE — 3078F DIAST BP <80 MM HG: CPT | Performed by: STUDENT IN AN ORGANIZED HEALTH CARE EDUCATION/TRAINING PROGRAM

## 2024-07-17 PROCEDURE — 3074F SYST BP LT 130 MM HG: CPT | Performed by: STUDENT IN AN ORGANIZED HEALTH CARE EDUCATION/TRAINING PROGRAM

## 2024-07-17 RX ORDER — METFORMIN HYDROCHLORIDE 500 MG/1
500 TABLET, EXTENDED RELEASE ORAL DAILY
Qty: 90 TABLET | Refills: 2 | Status: SHIPPED | OUTPATIENT
Start: 2024-07-17

## 2024-07-17 ASSESSMENT — ENCOUNTER SYMPTOMS
CHILLS: 0
FEVER: 0
SHORTNESS OF BREATH: 0

## 2024-07-17 ASSESSMENT — FIBROSIS 4 INDEX: FIB4 SCORE: 1.43

## 2024-09-03 ENCOUNTER — HOSPITAL ENCOUNTER (OUTPATIENT)
Dept: LAB | Facility: MEDICAL CENTER | Age: 64
End: 2024-09-03
Attending: FAMILY MEDICINE
Payer: COMMERCIAL

## 2024-09-03 DIAGNOSIS — Z11.4 SCREENING FOR HIV (HUMAN IMMUNODEFICIENCY VIRUS): ICD-10-CM

## 2024-09-03 DIAGNOSIS — E78.00 HYPERCHOLESTEROLEMIA: ICD-10-CM

## 2024-09-03 DIAGNOSIS — R73.03 PREDIABETES: ICD-10-CM

## 2024-09-03 LAB
ALBUMIN SERPL BCP-MCNC: 4.3 G/DL (ref 3.2–4.9)
ALBUMIN/GLOB SERPL: 1.3 G/DL
ALP SERPL-CCNC: 104 U/L (ref 30–99)
ALT SERPL-CCNC: 27 U/L (ref 2–50)
ANION GAP SERPL CALC-SCNC: 13 MMOL/L (ref 7–16)
AST SERPL-CCNC: 25 U/L (ref 12–45)
BILIRUB SERPL-MCNC: 0.6 MG/DL (ref 0.1–1.5)
BUN SERPL-MCNC: 17 MG/DL (ref 8–22)
CALCIUM ALBUM COR SERPL-MCNC: 9.3 MG/DL (ref 8.5–10.5)
CALCIUM SERPL-MCNC: 9.5 MG/DL (ref 8.5–10.5)
CHLORIDE SERPL-SCNC: 103 MMOL/L (ref 96–112)
CHOLEST SERPL-MCNC: 141 MG/DL (ref 100–199)
CO2 SERPL-SCNC: 24 MMOL/L (ref 20–33)
CREAT SERPL-MCNC: 0.82 MG/DL (ref 0.5–1.4)
EST. AVERAGE GLUCOSE BLD GHB EST-MCNC: 123 MG/DL
FASTING STATUS PATIENT QL REPORTED: NORMAL
GFR SERPLBLD CREATININE-BSD FMLA CKD-EPI: 99 ML/MIN/1.73 M 2
GLOBULIN SER CALC-MCNC: 3.2 G/DL (ref 1.9–3.5)
GLUCOSE SERPL-MCNC: 103 MG/DL (ref 65–99)
HBA1C MFR BLD: 5.9 % (ref 4–5.6)
HDLC SERPL-MCNC: 41 MG/DL
HIV 1+2 AB+HIV1 P24 AG SERPL QL IA: NORMAL
LDLC SERPL CALC-MCNC: 82 MG/DL
POTASSIUM SERPL-SCNC: 4.1 MMOL/L (ref 3.6–5.5)
PROT SERPL-MCNC: 7.5 G/DL (ref 6–8.2)
SODIUM SERPL-SCNC: 140 MMOL/L (ref 135–145)
TRIGL SERPL-MCNC: 88 MG/DL (ref 0–149)

## 2024-09-03 PROCEDURE — 80061 LIPID PANEL: CPT

## 2024-09-03 PROCEDURE — 83036 HEMOGLOBIN GLYCOSYLATED A1C: CPT

## 2024-09-03 PROCEDURE — 80053 COMPREHEN METABOLIC PANEL: CPT

## 2024-09-03 PROCEDURE — 36415 COLL VENOUS BLD VENIPUNCTURE: CPT

## 2024-09-03 PROCEDURE — 87389 HIV-1 AG W/HIV-1&-2 AB AG IA: CPT

## 2024-09-06 ENCOUNTER — OFFICE VISIT (OUTPATIENT)
Dept: INTERNAL MEDICINE | Facility: IMAGING CENTER | Age: 64
End: 2024-09-06
Payer: COMMERCIAL

## 2024-09-06 VITALS
DIASTOLIC BLOOD PRESSURE: 78 MMHG | SYSTOLIC BLOOD PRESSURE: 126 MMHG | TEMPERATURE: 97.4 F | OXYGEN SATURATION: 95 % | BODY MASS INDEX: 34.02 KG/M2 | HEART RATE: 61 BPM | WEIGHT: 243 LBS | RESPIRATION RATE: 14 BRPM | HEIGHT: 71 IN

## 2024-09-06 DIAGNOSIS — Z12.5 SCREENING PSA (PROSTATE SPECIFIC ANTIGEN): ICD-10-CM

## 2024-09-06 DIAGNOSIS — E66.9 OBESITY (BMI 30-39.9): ICD-10-CM

## 2024-09-06 DIAGNOSIS — R73.03 PREDIABETES: ICD-10-CM

## 2024-09-06 DIAGNOSIS — I10 ESSENTIAL HYPERTENSION: ICD-10-CM

## 2024-09-06 DIAGNOSIS — E78.00 HYPERCHOLESTEROLEMIA: ICD-10-CM

## 2024-09-06 PROBLEM — K57.30 DIVERTICULOSIS OF LARGE INTESTINE WITHOUT PERFORATION OR ABSCESS WITHOUT BLEEDING: Status: ACTIVE | Noted: 2024-07-22

## 2024-09-06 PROBLEM — D12.3 BENIGN NEOPLASM OF TRANSVERSE COLON: Status: ACTIVE | Noted: 2024-07-22

## 2024-09-06 PROBLEM — D17.9 LIPOMA: Status: ACTIVE | Noted: 2024-09-06

## 2024-09-06 PROCEDURE — 3078F DIAST BP <80 MM HG: CPT | Performed by: FAMILY MEDICINE

## 2024-09-06 PROCEDURE — 3074F SYST BP LT 130 MM HG: CPT | Performed by: FAMILY MEDICINE

## 2024-09-06 PROCEDURE — G2211 COMPLEX E/M VISIT ADD ON: HCPCS | Performed by: FAMILY MEDICINE

## 2024-09-06 PROCEDURE — 99214 OFFICE O/P EST MOD 30 MIN: CPT | Performed by: FAMILY MEDICINE

## 2024-09-06 RX ORDER — ROSUVASTATIN CALCIUM 5 MG/1
5 TABLET, COATED ORAL EVERY EVENING
Qty: 90 TABLET | Refills: 3 | Status: SHIPPED | OUTPATIENT
Start: 2024-09-06

## 2024-09-06 ASSESSMENT — FIBROSIS 4 INDEX: FIB4 SCORE: 1.32

## 2024-09-06 NOTE — PROGRESS NOTES
Verbal consent was acquired by the patient to use [employer] ambient listening note generation during this visit yes    Assessment & Plan:  Assessment & Plan  1. Prediabetes.  This is adequately controlled with current medications and lifestyle changes. His blood sugars have come down from being as high as 110 to the most recent labs showing a blood sugar of 103. Hemoglobin A1c has come down to 5.9. He will continue metformin 500 mg daily. Recheck in 3 months.    2. Obesity.  This is a chronic health problem showing improvement. His weight has decreased from a high of 264 lbs to 243 lbs, resulting in a BMI reduction from 35 to 33. He will continue current lifestyle changes, including diet, exercise, and rucking.    3. Essential hypertension.  This is a chronic health problem, well controlled with current medications. His blood pressure today is 126/78. He will continue losartan 100 mg daily and amlodipine 5 mg daily.    4. Hypercholesterolemia.  This is a chronic health problem, well controlled on rosuvastatin 5 mg daily. His total cholesterol is 141, triglycerides are 88, HDL is 41, and LDL is 82. The prescription for rosuvastatin 5 mg daily was renewed.    5. Increased risk of prostate cancer.  Due to his father's recent diagnosis of prostate cancer at age 84, he is at a slight increased risk. A PSA test will be done with his next set of labs.    Follow-up  The patient will follow up in 3 months.    Billing : secondary to the complexity of this patient's illnesses and their interactions.  All problems listed were discussed during the office visit, medications were evaluated and complexities were discussed as well as plan for the future.       Subjective:     HPI:   History of Present Illness  The patient is a long-time visitor who is here today to discuss his prediabetes, obesity, essential hypertension, hypercholesterolemia, and to share the recent diagnosis of prostate cancer in his father.    He has been  "actively pursuing weight loss under the guidance of Dr. Cunningham. His regimen includes metformin 500 mg daily, which has helped him reduce his BMI from 35 to the current range of 33. His weight is now 243 pounds, down from a high of 264 pounds. His efforts in diet and exercise have also positively impacted his prediabetes, with his blood sugar levels decreasing from a high of 110 to a recent reading of 103. His hemoglobin A1c level has decreased to 5.9, which will continue to be monitored. His obesity is showing signs of improvement.    He has hypertension, for which he is taking losartan 100 mg daily and amlodipine 5 mg daily. Even though he hoped that weight loss would lower his blood pressure, it remains in the normal range, although not low. Today's reading is 126/78. He reports no chest pain, shortness of breath, or dyspnea on exertion. He has incorporated rucking into his routine, which involves walking at least a mile carrying a 20-pound weight, and finds this beneficial.    For his hypercholesterolemia, he is prescribed rosuvastatin 5 mg daily. His total cholesterol has decreased to 141, triglycerides are at 88, HDL is 41, and LDL is 82. There is no indication of liver dysfunction.    Given his father's recent diagnosis of prostate cancer at age 84, he is considered to be at a slightly increased risk for the same condition.    Health Maintenance: Completed    Objective:     Exam:     /78 (BP Location: Left arm, Patient Position: Sitting, BP Cuff Size: Adult)   Pulse 61   Temp 36.3 °C (97.4 °F) (Temporal)   Resp 14   Ht 1.803 m (5' 11\")   Wt 110 kg (243 lb)   SpO2 95%   BMI 33.89 kg/m²  Body mass index is 33.89 kg/m².  Physical Exam  Constitutional:       General: He is not in acute distress.     Appearance: Normal appearance. He is normal weight. He is not ill-appearing.       Results  Laboratory Studies 9/3/24  Blood sugar is 103. Total cholesterol is 141, triglycerides are 88, HDL is 41 and LDL " is 82. Hemoglobin A1c is 5.9.                ORDERS     1. Prediabetes    - HEMOGLOBIN A1C; Future    2. Obesity (BMI 30-39.9)      3. Essential hypertension      4. Hypercholesterolemia    - rosuvastatin (CRESTOR) 5 MG Tab; Take 1 Tablet by mouth every evening.  Dispense: 90 Tablet; Refill: 3  - Lipid Profile; Future  - CBC WITHOUT DIFFERENTIAL; Future  - Comp Metabolic Panel; Future    5. Screening PSA (prostate specific antigen)    - PROSTATE SPECIFIC AG SCREENING; Future                Please note that this dictation was created using voice recognition software. I have made every reasonable attempt to correct obvious errors, but I expect that there are errors of grammar and possibly content that I did not discover before finalizing the note.

## 2024-10-17 ENCOUNTER — OFFICE VISIT (OUTPATIENT)
Dept: MEDICAL GROUP | Facility: LAB | Age: 64
End: 2024-10-17
Payer: COMMERCIAL

## 2024-10-17 VITALS
TEMPERATURE: 97.6 F | SYSTOLIC BLOOD PRESSURE: 130 MMHG | HEIGHT: 71 IN | BODY MASS INDEX: 34.57 KG/M2 | WEIGHT: 246.91 LBS | OXYGEN SATURATION: 94 % | DIASTOLIC BLOOD PRESSURE: 68 MMHG | RESPIRATION RATE: 14 BRPM | HEART RATE: 72 BPM

## 2024-10-17 DIAGNOSIS — E66.9 OBESITY (BMI 30-39.9): ICD-10-CM

## 2024-10-17 DIAGNOSIS — R53.83 OTHER FATIGUE: ICD-10-CM

## 2024-10-17 PROCEDURE — 3075F SYST BP GE 130 - 139MM HG: CPT | Performed by: STUDENT IN AN ORGANIZED HEALTH CARE EDUCATION/TRAINING PROGRAM

## 2024-10-17 PROCEDURE — 99213 OFFICE O/P EST LOW 20 MIN: CPT | Performed by: STUDENT IN AN ORGANIZED HEALTH CARE EDUCATION/TRAINING PROGRAM

## 2024-10-17 PROCEDURE — 3078F DIAST BP <80 MM HG: CPT | Performed by: STUDENT IN AN ORGANIZED HEALTH CARE EDUCATION/TRAINING PROGRAM

## 2024-10-17 ASSESSMENT — ENCOUNTER SYMPTOMS
SHORTNESS OF BREATH: 0
CHILLS: 0
FEVER: 0

## 2024-10-17 ASSESSMENT — FIBROSIS 4 INDEX: FIB4 SCORE: 1.32

## 2024-10-29 ENCOUNTER — APPOINTMENT (RX ONLY)
Dept: URBAN - METROPOLITAN AREA CLINIC 4 | Facility: CLINIC | Age: 64
Setting detail: DERMATOLOGY
End: 2024-10-29

## 2024-10-29 DIAGNOSIS — D22 MELANOCYTIC NEVI: ICD-10-CM

## 2024-10-29 DIAGNOSIS — D18.0 HEMANGIOMA: ICD-10-CM

## 2024-10-29 DIAGNOSIS — L81.4 OTHER MELANIN HYPERPIGMENTATION: ICD-10-CM

## 2024-10-29 DIAGNOSIS — Z85.820 PERSONAL HISTORY OF MALIGNANT MELANOMA OF SKIN: ICD-10-CM

## 2024-10-29 DIAGNOSIS — L57.8 OTHER SKIN CHANGES DUE TO CHRONIC EXPOSURE TO NONIONIZING RADIATION: ICD-10-CM

## 2024-10-29 DIAGNOSIS — L82.1 OTHER SEBORRHEIC KERATOSIS: ICD-10-CM

## 2024-10-29 PROBLEM — D18.01 HEMANGIOMA OF SKIN AND SUBCUTANEOUS TISSUE: Status: ACTIVE | Noted: 2024-10-29

## 2024-10-29 PROBLEM — D22.5 MELANOCYTIC NEVI OF TRUNK: Status: ACTIVE | Noted: 2024-10-29

## 2024-10-29 PROCEDURE — 99213 OFFICE O/P EST LOW 20 MIN: CPT

## 2024-10-29 PROCEDURE — ? COUNSELING

## 2024-10-29 ASSESSMENT — LOCATION DETAILED DESCRIPTION DERM
LOCATION DETAILED: LEFT DORSAL HAND
LOCATION DETAILED: RIGHT ANTERIOR PROXIMAL THIGH
LOCATION DETAILED: LEFT CLAVICULAR NECK
LOCATION DETAILED: RIGHT DORSAL HAND
LOCATION DETAILED: SUPERIOR THORACIC SPINE
LOCATION DETAILED: LEFT CHEEK
LOCATION DETAILED: LEFT ANTERIOR THIGH
LOCATION DETAILED: RIGHT ANTERIOR THIGH
LOCATION DETAILED: LEFT LATERAL ABDOMEN
LOCATION DETAILED: RIGHT CHEEK
LOCATION DETAILED: RIGHT DORSAL FOREARM
LOCATION DETAILED: LEFT DORSAL FOREARM
LOCATION DETAILED: LEFT MEDIAL SUPERIOR CHEST
LOCATION DETAILED: RIGHT SUPERIOR UPPER BACK
LOCATION DETAILED: LEFT ANTERIOR DISTAL THIGH
LOCATION DETAILED: LEFT UPPER BACK

## 2024-10-29 ASSESSMENT — LOCATION ZONE DERM
LOCATION ZONE: LEG
LOCATION ZONE: NECK
LOCATION ZONE: HAND
LOCATION ZONE: FACE
LOCATION ZONE: ARM
LOCATION ZONE: TRUNK

## 2024-10-29 ASSESSMENT — LOCATION SIMPLE DESCRIPTION DERM
LOCATION SIMPLE: BACK
LOCATION SIMPLE: RIGHT LOWER EXTREMITY
LOCATION SIMPLE: ABDOMEN
LOCATION SIMPLE: RIGHT CHEEK
LOCATION SIMPLE: LEFT UPPER EXTREMITY
LOCATION SIMPLE: LEFT THIGH
LOCATION SIMPLE: RIGHT UPPER EXTREMITY
LOCATION SIMPLE: LEFT ANTERIOR NECK
LOCATION SIMPLE: CHEST
LOCATION SIMPLE: RIGHT UPPER BACK
LOCATION SIMPLE: RIGHT THIGH
LOCATION SIMPLE: LEFT HAND
LOCATION SIMPLE: LEFT CHEEK
LOCATION SIMPLE: RIGHT HAND
LOCATION SIMPLE: UPPER BACK
LOCATION SIMPLE: LEFT LOWER EXTREMITY

## 2025-01-14 ENCOUNTER — HOSPITAL ENCOUNTER (OUTPATIENT)
Dept: LAB | Facility: MEDICAL CENTER | Age: 65
End: 2025-01-14
Attending: STUDENT IN AN ORGANIZED HEALTH CARE EDUCATION/TRAINING PROGRAM
Payer: COMMERCIAL

## 2025-01-14 ENCOUNTER — HOSPITAL ENCOUNTER (OUTPATIENT)
Dept: LAB | Facility: MEDICAL CENTER | Age: 65
End: 2025-01-14
Attending: FAMILY MEDICINE
Payer: COMMERCIAL

## 2025-01-14 DIAGNOSIS — E66.9 OBESITY (BMI 30-39.9): ICD-10-CM

## 2025-01-14 DIAGNOSIS — R53.83 OTHER FATIGUE: ICD-10-CM

## 2025-01-14 DIAGNOSIS — R73.03 PREDIABETES: ICD-10-CM

## 2025-01-14 DIAGNOSIS — Z12.5 SCREENING PSA (PROSTATE SPECIFIC ANTIGEN): ICD-10-CM

## 2025-01-14 DIAGNOSIS — E78.00 HYPERCHOLESTEROLEMIA: ICD-10-CM

## 2025-01-14 LAB
ALBUMIN SERPL BCP-MCNC: 4.7 G/DL (ref 3.2–4.9)
ALBUMIN/GLOB SERPL: 1.4 G/DL
ALP SERPL-CCNC: 100 U/L (ref 30–99)
ALT SERPL-CCNC: 35 U/L (ref 2–50)
ANION GAP SERPL CALC-SCNC: 12 MMOL/L (ref 7–16)
AST SERPL-CCNC: 24 U/L (ref 12–45)
BILIRUB SERPL-MCNC: 0.6 MG/DL (ref 0.1–1.5)
BUN SERPL-MCNC: 17 MG/DL (ref 8–22)
CALCIUM ALBUM COR SERPL-MCNC: 9.2 MG/DL (ref 8.5–10.5)
CALCIUM SERPL-MCNC: 9.8 MG/DL (ref 8.5–10.5)
CHLORIDE SERPL-SCNC: 103 MMOL/L (ref 96–112)
CHOLEST SERPL-MCNC: 141 MG/DL (ref 100–199)
CO2 SERPL-SCNC: 25 MMOL/L (ref 20–33)
CREAT SERPL-MCNC: 0.83 MG/DL (ref 0.5–1.4)
ERYTHROCYTE [DISTWIDTH] IN BLOOD BY AUTOMATED COUNT: 43.4 FL (ref 35.9–50)
EST. AVERAGE GLUCOSE BLD GHB EST-MCNC: 131 MG/DL
GFR SERPLBLD CREATININE-BSD FMLA CKD-EPI: 99 ML/MIN/1.73 M 2
GLOBULIN SER CALC-MCNC: 3.3 G/DL (ref 1.9–3.5)
GLUCOSE SERPL-MCNC: 95 MG/DL (ref 65–99)
HBA1C MFR BLD: 6.2 % (ref 4–5.6)
HCT VFR BLD AUTO: 47.6 % (ref 42–52)
HDLC SERPL-MCNC: 50 MG/DL
HGB BLD-MCNC: 15.6 G/DL (ref 14–18)
LDLC SERPL CALC-MCNC: 71 MG/DL
MCH RBC QN AUTO: 28.8 PG (ref 27–33)
MCHC RBC AUTO-ENTMCNC: 32.8 G/DL (ref 32.3–36.5)
MCV RBC AUTO: 88 FL (ref 81.4–97.8)
PLATELET # BLD AUTO: 224 K/UL (ref 164–446)
PMV BLD AUTO: 8.3 FL (ref 9–12.9)
POTASSIUM SERPL-SCNC: 4.2 MMOL/L (ref 3.6–5.5)
PROT SERPL-MCNC: 8 G/DL (ref 6–8.2)
PSA SERPL DL<=0.01 NG/ML-MCNC: 1.07 NG/ML (ref 0–4)
RBC # BLD AUTO: 5.41 M/UL (ref 4.7–6.1)
SODIUM SERPL-SCNC: 140 MMOL/L (ref 135–145)
TESTOST SERPL-MCNC: 857 NG/DL (ref 175–781)
TRIGL SERPL-MCNC: 100 MG/DL (ref 0–149)
WBC # BLD AUTO: 5.6 K/UL (ref 4.8–10.8)

## 2025-01-14 PROCEDURE — 80061 LIPID PANEL: CPT

## 2025-01-14 PROCEDURE — 84403 ASSAY OF TOTAL TESTOSTERONE: CPT

## 2025-01-14 PROCEDURE — 85027 COMPLETE CBC AUTOMATED: CPT

## 2025-01-14 PROCEDURE — 80053 COMPREHEN METABOLIC PANEL: CPT

## 2025-01-14 PROCEDURE — 83036 HEMOGLOBIN GLYCOSYLATED A1C: CPT

## 2025-01-14 PROCEDURE — 84153 ASSAY OF PSA TOTAL: CPT

## 2025-01-14 PROCEDURE — 36415 COLL VENOUS BLD VENIPUNCTURE: CPT

## 2025-01-17 ENCOUNTER — APPOINTMENT (OUTPATIENT)
Dept: INTERNAL MEDICINE | Facility: IMAGING CENTER | Age: 65
End: 2025-01-17
Payer: COMMERCIAL

## 2025-01-17 VITALS
HEIGHT: 71 IN | RESPIRATION RATE: 14 BRPM | TEMPERATURE: 98.1 F | DIASTOLIC BLOOD PRESSURE: 72 MMHG | WEIGHT: 246 LBS | OXYGEN SATURATION: 96 % | HEART RATE: 75 BPM | BODY MASS INDEX: 34.44 KG/M2 | SYSTOLIC BLOOD PRESSURE: 128 MMHG

## 2025-01-17 DIAGNOSIS — E78.00 HYPERCHOLESTEROLEMIA: ICD-10-CM

## 2025-01-17 DIAGNOSIS — E88.810 METABOLIC SYNDROME: ICD-10-CM

## 2025-01-17 DIAGNOSIS — R73.03 PREDIABETES: ICD-10-CM

## 2025-01-17 DIAGNOSIS — I10 ESSENTIAL HYPERTENSION: ICD-10-CM

## 2025-01-17 DIAGNOSIS — R79.89 ELEVATED TESTOSTERONE LEVEL IN MALE: ICD-10-CM

## 2025-01-17 PROCEDURE — 3078F DIAST BP <80 MM HG: CPT | Performed by: FAMILY MEDICINE

## 2025-01-17 PROCEDURE — 99214 OFFICE O/P EST MOD 30 MIN: CPT | Performed by: FAMILY MEDICINE

## 2025-01-17 PROCEDURE — 3074F SYST BP LT 130 MM HG: CPT | Performed by: FAMILY MEDICINE

## 2025-01-17 ASSESSMENT — FIBROSIS 4 INDEX: FIB4 SCORE: 1.12

## 2025-01-17 ASSESSMENT — PATIENT HEALTH QUESTIONNAIRE - PHQ9: CLINICAL INTERPRETATION OF PHQ2 SCORE: 0

## 2025-01-18 NOTE — PROGRESS NOTES
Verbal consent was acquired by the patient to use [employer] ambient listening note generation during this visit yes    Assessment & Plan:  Assessment & Plan  1. Prediabetes.  This is a chronic health issue that is currently well-managed. He has been working on weight loss and exercising more, which has improved his blood sugar levels. His blood sugar was 95, and his A1c was 6.2, even after traveling and spending time with family during the holidays.    2. Metabolic syndrome.  This is a chronic health issue that is borderline controlled. The elevated testosterone level raises concerns about potential subclinical Cushing's syndrome. Further consultation with his internal medicine physician, Dr. Cunningham, is necessary to determine if additional workup is needed.    3. Essential hypertension.  This is a chronic health issue that is well-managed. He is currently on losartan 100 mg daily and amlodipine 5 mg daily, which have effectively controlled his blood pressure. Today's reading was 128/72. He reports no chest pain, shortness of breath, or dyspnea on exertion and is actively participating in ski racing twice a week without any issues. He will continue his current medication regimen long-term.    4. Hypercholesterolemia.  This is a chronic health issue that is well-managed. He is currently on rosuvastatin 5 mg daily, which has resulted in excellent lipid profile numbers: total cholesterol 141, triglycerides 100, HDL 50, and LDL 71. There is no evidence of liver dysfunction. He will continue his current medication regimen for the foreseeable future.    5. Elevated testosterone level in a male.  This is an acute issue that is currently uncontrolled. There are no previous labs available for comparison. The persistence of this condition and its potential link to hormonal imbalance and endocrine issues need to be evaluated. Further lab studies will be deferred until consultation with his internist.    Follow-up  The patient  will follow up in 3 months, with labs to be conducted prior to the appointment. Additional labs may be ordered based on Dr. Cunningham's recommendations.        Subjective:     HPI:   History of Present Illness  The patient is here today to follow up on his chronic health problems, which include prediabetes, metabolic syndrome, essential hypertension, and hypercholesterolemia. Additionally, he had some lab work done for another provider, which showed an elevated testosterone level of unclear significance. It was recommended that further work-up be held off until consultation with his internal medicine physician, and he is agreeable to this.    Regarding his prediabetes, he has been working on weight loss but has not lost any weight. However, he has been able to improve his blood sugars through increased exercise. His blood sugar was normal at 95, and his A1c is 6.2. This is after traveling during the holidays and spending a great deal of time with family, where he was encouraged to eat more. Overall, he is doing well.    For his metabolic syndrome, there is concern that the elevated testosterone may indicate subclinical Cushing's disease. Further discussion with his internal medicine physician will determine if additional work-up is needed.    For his essential hypertension, he is on losartan 100 mg daily and amlodipine 5 mg daily, and his blood pressure is well-controlled. He reports no chest pain, shortness of breath, or dyspnea on exertion and is actively participating in ski racing twice a week without any issues. His blood pressure today is 128/72.    He has hypercholesterolemia, for which he is on rosuvastatin 5 mg daily. His lipid profile is excellent, with a total cholesterol of 141, triglycerides 100, HDL 50, and LDL 71. There is no evidence of liver dysfunction.    An elevated testosterone level was noted in recent lab work. Further lab studies will be deferred until consultation with his  "internist.        Health Maintenance: Completed    Objective:     Exam:     /72 (BP Location: Left arm, Patient Position: Sitting, BP Cuff Size: Large adult)   Pulse 75   Temp 36.7 °C (98.1 °F) (Temporal)   Resp 14   Ht 1.803 m (5' 11\")   Wt 112 kg (246 lb)   SpO2 96%   BMI 34.31 kg/m²  Body mass index is 34.31 kg/m².  Physical Exam  Constitutional:       General: He is not in acute distress.     Appearance: Normal appearance. He is normal weight. He is not ill-appearing.             Results  Laboratory Studies:  1/14/25  Blood sugar was normal at 95. A1c is 6.2. Total cholesterol 141, triglycerides 100, HDL 50, LDL 71. Elevated testosterone level.                ORDERS     1. Prediabetes      2. Metabolic syndrome      3. Essential hypertension      4. Hypercholesterolemia      5. Elevated testosterone level in male                  Please note that this dictation was created using voice recognition software. I have made every reasonable attempt to correct obvious errors, but I expect that there are errors of grammar and possibly content that I did not discover before finalizing the note.        "

## 2025-01-24 DIAGNOSIS — R79.89 HIGH SERUM TESTOSTERONE: ICD-10-CM

## 2025-02-13 ENCOUNTER — HOSPITAL ENCOUNTER (OUTPATIENT)
Dept: LAB | Facility: MEDICAL CENTER | Age: 65
End: 2025-02-13
Attending: STUDENT IN AN ORGANIZED HEALTH CARE EDUCATION/TRAINING PROGRAM
Payer: COMMERCIAL

## 2025-02-13 DIAGNOSIS — R79.89 HIGH SERUM TESTOSTERONE: ICD-10-CM

## 2025-02-13 LAB
DHEA-S SERPL-MCNC: 96.6 UG/DL (ref 51.7–295)
FSH SERPL-ACNC: 5.8 MIU/ML (ref 1.5–12.4)
LH SERPL-ACNC: 7.4 IU/L (ref 1.7–8.6)

## 2025-02-13 PROCEDURE — 84270 ASSAY OF SEX HORMONE GLOBUL: CPT

## 2025-02-13 PROCEDURE — 84403 ASSAY OF TOTAL TESTOSTERONE: CPT

## 2025-02-13 PROCEDURE — 83001 ASSAY OF GONADOTROPIN (FSH): CPT

## 2025-02-13 PROCEDURE — 36415 COLL VENOUS BLD VENIPUNCTURE: CPT

## 2025-02-13 PROCEDURE — 83002 ASSAY OF GONADOTROPIN (LH): CPT

## 2025-02-13 PROCEDURE — 82627 DEHYDROEPIANDROSTERONE: CPT

## 2025-02-13 PROCEDURE — 84402 ASSAY OF FREE TESTOSTERONE: CPT

## 2025-02-15 LAB
SHBG SERPL-SCNC: 50 NMOL/L (ref 19–76)
TESTOST FREE MFR SERPL: 1.6 % (ref 1.6–2.9)
TESTOST FREE SERPL-MCNC: 121 PG/ML (ref 47–244)
TESTOST SERPL-MCNC: 765 NG/DL (ref 300–720)

## 2025-02-18 ENCOUNTER — OFFICE VISIT (OUTPATIENT)
Dept: MEDICAL GROUP | Facility: LAB | Age: 65
End: 2025-02-18
Payer: COMMERCIAL

## 2025-02-18 VITALS
HEIGHT: 71 IN | WEIGHT: 247.58 LBS | OXYGEN SATURATION: 94 % | SYSTOLIC BLOOD PRESSURE: 126 MMHG | HEART RATE: 76 BPM | BODY MASS INDEX: 34.66 KG/M2 | TEMPERATURE: 97.6 F | DIASTOLIC BLOOD PRESSURE: 76 MMHG | RESPIRATION RATE: 16 BRPM

## 2025-02-18 DIAGNOSIS — R79.89 ELEVATED TESTOSTERONE LEVEL IN MALE: ICD-10-CM

## 2025-02-18 DIAGNOSIS — E66.9 OBESITY (BMI 30-39.9): ICD-10-CM

## 2025-02-18 PROCEDURE — 99214 OFFICE O/P EST MOD 30 MIN: CPT | Performed by: STUDENT IN AN ORGANIZED HEALTH CARE EDUCATION/TRAINING PROGRAM

## 2025-02-18 PROCEDURE — 3074F SYST BP LT 130 MM HG: CPT | Performed by: STUDENT IN AN ORGANIZED HEALTH CARE EDUCATION/TRAINING PROGRAM

## 2025-02-18 PROCEDURE — 3078F DIAST BP <80 MM HG: CPT | Performed by: STUDENT IN AN ORGANIZED HEALTH CARE EDUCATION/TRAINING PROGRAM

## 2025-02-18 ASSESSMENT — FIBROSIS 4 INDEX: FIB4 SCORE: 1.12

## 2025-02-18 ASSESSMENT — ENCOUNTER SYMPTOMS
FEVER: 0
CHILLS: 0
SHORTNESS OF BREATH: 0

## 2025-02-18 NOTE — PROGRESS NOTES
Subjective:     CC:   Chief Complaint   Patient presents with    Weight Check        HPI:   History of Present Illness  The patient presents for evaluation of elevated testosterone levels and weight management.    He has been experiencing difficulty in losing weight, despite maintaining a regular exercise regimen of skiing 3 to 4 days per week. His dietary habits include consuming turkey sandwiches for lunch, chicken for dinner, and occasionally indulging in [employer] cookies every other day. He typically dines around 6 PM and retires to bed by 10 PM. He reports feeling strong and healthy overall. During the summer months, he engages in walking activities post-dinner but has been unable to continue this due to the onset of cold and icy weather conditions. He is curious about the caloric expenditure associated with skiing.             Current Outpatient Medications Ordered in Epic   Medication Sig Dispense Refill    rosuvastatin (CRESTOR) 5 MG Tab Take 1 Tablet by mouth every evening. 90 Tablet 3    metFORMIN ER (GLUCOPHAGE XR) 500 MG TABLET SR 24 HR Take 1 Tablet by mouth every day. 90 Tablet 2    amLODIPine (NORVASC) 5 MG Tab TAKE ONE TABLET BY MOUTH EVERY DAY 90 Tablet 3    losartan (COZAAR) 100 MG Tab Take 1 Tablet by mouth every day. 90 Tablet 3    omeprazole (PRILOSEC) 20 MG delayed-release capsule Take 20 mg by mouth every day.      Cholecalciferol (VITAMIN D3) 125 MCG (5000 UT) Cap Take 1 Cap by mouth every day. 30 Cap      No current Epic-ordered facility-administered medications on file.           ROS:  Review of Systems   Constitutional:  Negative for chills and fever.   Respiratory:  Negative for shortness of breath.    Cardiovascular:  Negative for chest pain.   Genitourinary:  Negative for dysuria, frequency, hematuria and urgency.       Objective:     Exam:  /76 (BP Location: Right arm, Patient Position: Sitting, BP Cuff Size: Adult)   Pulse 76   Temp 36.4 °C (97.6 °F) (Temporal)   Resp 16   Ht  "1.803 m (5' 11\")   Wt 112 kg (247 lb 9.2 oz) Comment: 243 lb  SpO2 94%   BMI 34.53 kg/m²  Body mass index is 34.53 kg/m².    Physical Exam  Constitutional:       General: He is not in acute distress.     Appearance: He is not ill-appearing.   Pulmonary:      Effort: Pulmonary effort is normal.   Neurological:      Mental Status: He is alert.   Psychiatric:         Mood and Affect: Mood normal.         Behavior: Behavior normal.         Thought Content: Thought content normal.         Judgment: Judgment normal.                   Assessment & Plan:     Assessment & Plan  1. Elevated testosterone levels.  His testosterone levels have been consistently elevated, with initial reading of 857 and a subsequent reading of 765, both exceeding the upper limit of normal at 720. DHEA, FSH, and LH levels were within normal range, suggesting the need to investigate the testicles for potential overproduction of testosterone. PSA levels checked in January were normal at 1.07, indicating no immediate concern for prostate cancer. He was educated about the potential signs of prostate enlargement or cancer, such as weak urinary stream, hematuria, or nocturia, and advised to report any such symptoms promptly. An ultrasound of the scrotum will be ordered to rule out the presence of a mass. He was also advised to maintain regular PSA checks due to the increased risk of prostate cancer associated with elevated testosterone levels.    2. Obesity-chronic  He has been struggling with weight loss despite maintaining a regular exercise regimen. His weight has remained stable around 246 pounds since October, following a vacation during which he gained a few pounds. He was reassured that his elevated testosterone levels are not contributing to his weight issues. He was advised to make dinner the least heaviest meal of the day followed by two cookies. The potential benefits of intermittent fasting were discussed. He was also encouraged to " increase physical activity during the winter months, possibly by using a treadmill after meals, or alternatively, to reduce food intake during this period. He was further advised to replace dinner with two protein shakes or meals twice a week. Continue metformin for now.      Problem List Items Addressed This Visit       Elevated testosterone level in male    Relevant Orders    [id number]    Obesity (BMI 30-39.9)       3+ labs reviewed    6 month follow up         Please note that this dictation was created using voice recognition software. I have made every reasonable attempt to correct obvious errors, but I expect that there are errors of grammar and possibly content that I did not discover before finalizing the note.

## 2025-02-19 ENCOUNTER — HOSPITAL ENCOUNTER (OUTPATIENT)
Dept: RADIOLOGY | Facility: MEDICAL CENTER | Age: 65
End: 2025-02-19
Attending: STUDENT IN AN ORGANIZED HEALTH CARE EDUCATION/TRAINING PROGRAM
Payer: COMMERCIAL

## 2025-02-19 DIAGNOSIS — R79.89 ELEVATED TESTOSTERONE LEVEL IN MALE: ICD-10-CM

## 2025-02-19 PROCEDURE — 76870 US EXAM SCROTUM: CPT

## 2025-02-20 ENCOUNTER — RESULTS FOLLOW-UP (OUTPATIENT)
Dept: MEDICAL GROUP | Facility: LAB | Age: 65
End: 2025-02-20

## 2025-02-20 ENCOUNTER — PATIENT MESSAGE (OUTPATIENT)
Dept: INTERNAL MEDICINE | Facility: IMAGING CENTER | Age: 65
End: 2025-02-20
Payer: COMMERCIAL

## 2025-02-20 DIAGNOSIS — E88.810 METABOLIC SYNDROME: ICD-10-CM

## 2025-02-20 DIAGNOSIS — R73.03 PREDIABETES: ICD-10-CM

## 2025-02-20 DIAGNOSIS — E78.00 HYPERCHOLESTEROLEMIA: ICD-10-CM

## 2025-03-28 DIAGNOSIS — E66.9 OBESITY (BMI 30-39.9): ICD-10-CM

## 2025-03-28 DIAGNOSIS — R73.03 PREDIABETES: ICD-10-CM

## 2025-03-31 RX ORDER — METFORMIN HYDROCHLORIDE 500 MG/1
500 TABLET, EXTENDED RELEASE ORAL
Qty: 90 TABLET | Refills: 2 | Status: SHIPPED | OUTPATIENT
Start: 2025-03-31

## 2025-03-31 NOTE — TELEPHONE ENCOUNTER
Received request via: Pharmacy    Was the patient seen in the last year in this department? Yes    Does the patient have an active prescription (recently filled or refills available) for medication(s) requested? No    Pharm[facility] Name: [facility]    Does the patient have [facility] Plus and need 100-day supply? (This applies to ALL medications) Patient does not have SCP

## 2025-04-01 RX ORDER — LOSARTAN POTASSIUM 100 MG/1
100 TABLET ORAL
Qty: 90 TABLET | Refills: 3 | Status: SHIPPED | OUTPATIENT
Start: 2025-04-01

## 2025-05-19 ENCOUNTER — APPOINTMENT (OUTPATIENT)
Dept: URBAN - METROPOLITAN AREA CLINIC 4 | Facility: CLINIC | Age: 65
Setting detail: DERMATOLOGY
End: 2025-05-19

## 2025-05-19 DIAGNOSIS — Z85.820 PERSONAL HISTORY OF MALIGNANT MELANOMA OF SKIN: ICD-10-CM

## 2025-05-19 DIAGNOSIS — L81.4 OTHER MELANIN HYPERPIGMENTATION: ICD-10-CM

## 2025-05-19 DIAGNOSIS — L57.8 OTHER SKIN CHANGES DUE TO CHRONIC EXPOSURE TO NONIONIZING RADIATION: ICD-10-CM

## 2025-05-19 DIAGNOSIS — D22 MELANOCYTIC NEVI: ICD-10-CM

## 2025-05-19 DIAGNOSIS — L82.1 OTHER SEBORRHEIC KERATOSIS: ICD-10-CM

## 2025-05-19 DIAGNOSIS — D18.0 HEMANGIOMA: ICD-10-CM

## 2025-05-19 PROBLEM — D18.01 HEMANGIOMA OF SKIN AND SUBCUTANEOUS TISSUE: Status: ACTIVE | Noted: 2025-05-19

## 2025-05-19 PROBLEM — D22.5 MELANOCYTIC NEVI OF TRUNK: Status: ACTIVE | Noted: 2025-05-19

## 2025-05-19 PROCEDURE — ? COUNSELING

## 2025-05-19 PROCEDURE — 99213 OFFICE O/P EST LOW 20 MIN: CPT

## 2025-05-19 ASSESSMENT — LOCATION SIMPLE DESCRIPTION DERM
LOCATION SIMPLE: LEFT LOWER EXTREMITY
LOCATION SIMPLE: LEFT UPPER EXTREMITY
LOCATION SIMPLE: CHEST
LOCATION SIMPLE: BACK
LOCATION SIMPLE: RIGHT HAND
LOCATION SIMPLE: RIGHT UPPER EXTREMITY
LOCATION SIMPLE: LEFT CHEEK
LOCATION SIMPLE: UPPER BACK
LOCATION SIMPLE: RIGHT LOWER EXTREMITY
LOCATION SIMPLE: LEFT ANTERIOR NECK
LOCATION SIMPLE: LEFT THIGH
LOCATION SIMPLE: RIGHT CHEEK
LOCATION SIMPLE: ABDOMEN
LOCATION SIMPLE: LEFT HAND
LOCATION SIMPLE: RIGHT THIGH
LOCATION SIMPLE: RIGHT UPPER BACK

## 2025-05-19 ASSESSMENT — LOCATION ZONE DERM
LOCATION ZONE: NECK
LOCATION ZONE: ARM
LOCATION ZONE: HAND
LOCATION ZONE: FACE
LOCATION ZONE: TRUNK
LOCATION ZONE: LEG

## 2025-05-19 ASSESSMENT — LOCATION DETAILED DESCRIPTION DERM
LOCATION DETAILED: RIGHT DORSAL HAND
LOCATION DETAILED: LEFT DORSAL FOREARM
LOCATION DETAILED: LEFT ANTERIOR DISTAL THIGH
LOCATION DETAILED: RIGHT ANTERIOR PROXIMAL THIGH
LOCATION DETAILED: LEFT UPPER BACK
LOCATION DETAILED: LEFT CLAVICULAR NECK
LOCATION DETAILED: RIGHT SUPERIOR UPPER BACK
LOCATION DETAILED: LEFT DORSAL HAND
LOCATION DETAILED: RIGHT DORSAL FOREARM
LOCATION DETAILED: RIGHT ANTERIOR THIGH
LOCATION DETAILED: LEFT LATERAL ABDOMEN
LOCATION DETAILED: LEFT MEDIAL SUPERIOR CHEST
LOCATION DETAILED: LEFT CHEEK
LOCATION DETAILED: SUPERIOR THORACIC SPINE
LOCATION DETAILED: RIGHT CHEEK
LOCATION DETAILED: LEFT ANTERIOR THIGH

## 2025-05-20 ENCOUNTER — HOSPITAL ENCOUNTER (OUTPATIENT)
Dept: LAB | Facility: MEDICAL CENTER | Age: 65
End: 2025-05-20
Attending: FAMILY MEDICINE
Payer: COMMERCIAL

## 2025-05-20 DIAGNOSIS — R73.03 PREDIABETES: ICD-10-CM

## 2025-05-20 DIAGNOSIS — E78.00 HYPERCHOLESTEROLEMIA: ICD-10-CM

## 2025-05-20 DIAGNOSIS — E88.810 METABOLIC SYNDROME: ICD-10-CM

## 2025-05-20 LAB
ALBUMIN SERPL BCP-MCNC: 4.2 G/DL (ref 3.2–4.9)
ALBUMIN/GLOB SERPL: 1.2 G/DL
ALP SERPL-CCNC: 110 U/L (ref 30–99)
ALT SERPL-CCNC: 35 U/L (ref 2–50)
ANION GAP SERPL CALC-SCNC: 10 MMOL/L (ref 7–16)
AST SERPL-CCNC: 26 U/L (ref 12–45)
BILIRUB SERPL-MCNC: 0.5 MG/DL (ref 0.1–1.5)
BUN SERPL-MCNC: 16 MG/DL (ref 8–22)
CALCIUM ALBUM COR SERPL-MCNC: 9.1 MG/DL (ref 8.5–10.5)
CALCIUM SERPL-MCNC: 9.3 MG/DL (ref 8.5–10.5)
CHLORIDE SERPL-SCNC: 106 MMOL/L (ref 96–112)
CHOLEST SERPL-MCNC: 133 MG/DL (ref 100–199)
CO2 SERPL-SCNC: 25 MMOL/L (ref 20–33)
CREAT SERPL-MCNC: 0.87 MG/DL (ref 0.5–1.4)
EST. AVERAGE GLUCOSE BLD GHB EST-MCNC: 131 MG/DL
FASTING STATUS PATIENT QL REPORTED: NORMAL
GFR SERPLBLD CREATININE-BSD FMLA CKD-EPI: 97 ML/MIN/1.73 M 2
GLOBULIN SER CALC-MCNC: 3.4 G/DL (ref 1.9–3.5)
GLUCOSE SERPL-MCNC: 100 MG/DL (ref 65–99)
HBA1C MFR BLD: 6.2 % (ref 4–5.6)
HDLC SERPL-MCNC: 43 MG/DL
LDLC SERPL CALC-MCNC: 71 MG/DL
POTASSIUM SERPL-SCNC: 4.5 MMOL/L (ref 3.6–5.5)
PROT SERPL-MCNC: 7.6 G/DL (ref 6–8.2)
SODIUM SERPL-SCNC: 141 MMOL/L (ref 135–145)
TRIGL SERPL-MCNC: 93 MG/DL (ref 0–149)

## 2025-05-20 PROCEDURE — 80061 LIPID PANEL: CPT

## 2025-05-20 PROCEDURE — 83036 HEMOGLOBIN GLYCOSYLATED A1C: CPT

## 2025-05-20 PROCEDURE — 80053 COMPREHEN METABOLIC PANEL: CPT

## 2025-05-20 PROCEDURE — 36415 COLL VENOUS BLD VENIPUNCTURE: CPT

## 2025-05-22 ENCOUNTER — RESULTS FOLLOW-UP (OUTPATIENT)
Dept: INTERNAL MEDICINE | Facility: IMAGING CENTER | Age: 65
End: 2025-05-22

## 2025-05-23 ENCOUNTER — OFFICE VISIT (OUTPATIENT)
Dept: INTERNAL MEDICINE | Facility: IMAGING CENTER | Age: 65
End: 2025-05-23
Payer: COMMERCIAL

## 2025-05-23 VITALS
WEIGHT: 243 LBS | BODY MASS INDEX: 34.02 KG/M2 | RESPIRATION RATE: 14 BRPM | HEIGHT: 71 IN | TEMPERATURE: 97.8 F | OXYGEN SATURATION: 93 % | DIASTOLIC BLOOD PRESSURE: 76 MMHG | SYSTOLIC BLOOD PRESSURE: 136 MMHG | HEART RATE: 73 BPM

## 2025-05-23 DIAGNOSIS — E78.00 HYPERCHOLESTEROLEMIA: ICD-10-CM

## 2025-05-23 DIAGNOSIS — E66.9 OBESITY (BMI 30-39.9): ICD-10-CM

## 2025-05-23 DIAGNOSIS — I10 ESSENTIAL HYPERTENSION: Primary | ICD-10-CM

## 2025-05-23 DIAGNOSIS — R73.03 PREDIABETES: ICD-10-CM

## 2025-05-23 PROBLEM — B07.9 VERRUCA: Status: ACTIVE | Noted: 2022-10-10

## 2025-05-23 PROBLEM — L82.1 SEBORRHEIC KERATOSIS: Status: ACTIVE | Noted: 2021-09-01

## 2025-05-23 PROCEDURE — 3075F SYST BP GE 130 - 139MM HG: CPT | Performed by: FAMILY MEDICINE

## 2025-05-23 PROCEDURE — 3078F DIAST BP <80 MM HG: CPT | Performed by: FAMILY MEDICINE

## 2025-05-23 PROCEDURE — 99214 OFFICE O/P EST MOD 30 MIN: CPT | Performed by: FAMILY MEDICINE

## 2025-05-23 RX ORDER — CHOLECALCIFEROL (VITAMIN D3) 50 MCG
2000 TABLET ORAL DAILY
COMMUNITY
End: 2025-05-23

## 2025-05-23 RX ORDER — AMLODIPINE BESYLATE 5 MG/1
5 TABLET ORAL
Qty: 90 TABLET | Refills: 3 | Status: SHIPPED | OUTPATIENT
Start: 2025-05-23

## 2025-05-23 ASSESSMENT — FIBROSIS 4 INDEX: FIB4 SCORE: 1.22

## 2025-05-23 NOTE — PROGRESS NOTES
Verbal consent was acquired by the patient to use [employer] ambient listening note generation during this visit yes    Assessment & Plan:  Assessment & Plan  Essential hypertension  Chronic condition is currently well-managed with the existing medication regimen of losartan 100 mg daily and amlodipine 5 mg daily.  Diagnostic plan: Blood pressure today is 136/76.  Treatment plan: Continue current medications.  Clinical decision making: Denies chest pain, shortness of breath, or dyspnea on exertion.    Hypercholesterolemia  Chronic condition is well-controlled with the current medication regimen of rosuvastatin 5 mg daily.  Diagnostic plan: Recent blood work from 05/20/2025 shows total cholesterol at 133, triglycerides at 93, HDL slightly decreased to 43, and LDL at 71.  Treatment plan: Advised to incorporate more physical activity into his daily routine to address this.  Clinical decision making: The decrease in HDL may be due to reduced aerobic activity compared to the winter when he was ski racing nearly every day.    Prediabetes  Chronic condition is well-controlled with the current medication regimen.  Diagnostic plan: Fasting glucose is 100, and A1c is stable at 6.2.  Treatment plan: Continue monitoring fasting glucose and A1c levels.    Obesity  Chronic condition, indicated by a BMI of 33.89, is not well-controlled but shows continued improvement.  Diagnostic plan: Slowly losing weight over the last year, approximately 4 to 5 pounds every 3 months.  Treatment plan: Considering the use of an SGLT2 inhibitor to accelerate weight loss.  Clinical decision making: Will monitor progress through lifestyle management at the start of the summer and will inform if he decides to start Ozempic or another injectable weight loss medication.    Follow-up: Follow-up in 3 months with labs prior to the visit.        Subjective:     HPI:   History of Present Illness  The patient presents today for follow-up on his chronic  health problems, which include essential hypertension, hypercholesterolemia, prediabetes, and obesity.    Weight Loss Journey  He has been gradually losing weight over the past year, averaging a loss of 4 to 5 pounds every 3 months. Although he is not overly frustrated with the slow pace of his weight loss, he expresses a desire to expedite the process. He mentions that his wife has experienced significant weight loss, approximately 45 pounds, through the use of generic Ozempic, and he is contemplating whether this medication could aid in his own weight loss journey. 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 He plans to dedicate the early part of the summer to exploring weight loss through lifestyle modifications.  - Onset: Gradual weight loss over the past year.  - Duration: Averaging a loss of 4 to 5 pounds every 3 months.  - Character: Gradual weight loss.  - Alleviating/Aggravating Factors: Considering generic Ozempic; incorporating golf and weightlifting into weekly routine.  - Timing: Weight loss journey ongoing; plans to explore lifestyle modifications in early summer.  - Severity: Desires to expedite weight loss process.    Hypertension  His hypertension is well managed with a daily regimen of losartan 100 mg and amlodipine 5 mg. He reports no symptoms of chest pain, shortness of breath, or exertional dyspnea.  - Alleviating/Aggravating Factors: Managed with losartan 100 mg and amlodipine 5 mg daily.  - Severity: Well managed; no symptoms of chest pain, shortness of breath, or exertional dyspnea.    Hypercholesterolemia  For his hypercholesterolemia, he is on a daily dose of rosuvastatin 5 mg. His blood work from 05/20/2025 shows a total cholesterol of 133, triglycerides of 93, HDL of 43, and LDL of 71. He acknowledges that his HDL may have dropped due to reduced aerobic activity compared to the winter months when he was  "ski racing nearly every day. He plans to increase his daily physical activity to address this.  - Alleviating/Aggravating Factors: Managed with rosuvastatin 5 mg daily; plans to increase daily physical activity.  - Timing: Blood work from 05/20/2025.  - Severity: HDL may have dropped due to reduced aerobic activity.    Prediabetes  His prediabetes is well controlled, with a fasting glucose of 100 and an A1c of 6.2.  - Severity: Well controlled.    Obesity  Regarding obesity, he has a BMI of 33.89. Although he has shown improvement, he is considering the use of an SGLT2 inhibitor to accelerate his weight loss.  - Severity: BMI of 33.89; considering SGLT2 inhibitor to accelerate weight loss.    Health Maintenance: Completed    Objective:     Exam:     /76 (BP Location: Left arm, Patient Position: Sitting, BP Cuff Size: Adult)   Pulse 73   Temp 36.6 °C (97.8 °F) (Temporal)   Resp 14   Ht 1.803 m (5' 11\")   Wt 110 kg (243 lb)   SpO2 93%   BMI 33.89 kg/m²  Body mass index is 33.89 kg/m².  Physical Exam  Constitutional:       General: he is not in acute distress.     Appearance: Normal appearance. he is overweight. he is not ill-appearing.        Results  Labs: 5/20/25   - Total cholesterol: 05/20/2025, 133   - Triglycerides: 05/20/2025, 93   - HDL: 05/20/2025, 43   - LDL: 05/20/2025, 71   - Fasting glucose: 05/20/2025, 100   - A1c: 05/20/2025, 6.2                ORDERS     1. Essential hypertension (Primary)      2. Hypercholesterolemia    - Lipid Profile; Future  - Comp Metabolic Panel; Future    3. Prediabetes      4. Obesity (BMI 30-39.9)                  Please note that this dictation was created using voice recognition software. I have made every reasonable attempt to correct obvious errors, but I expect that there are errors of grammar and possibly content that I did not discover before finalizing the note.        "

## 2025-07-16 RX ORDER — MONTELUKAST SODIUM 10 MG/1
10 TABLET ORAL DAILY
Qty: 90 TABLET | Refills: 3 | Status: SHIPPED | OUTPATIENT
Start: 2025-07-16

## 2025-08-18 ENCOUNTER — HOSPITAL ENCOUNTER (OUTPATIENT)
Dept: LAB | Facility: MEDICAL CENTER | Age: 65
End: 2025-08-18
Attending: FAMILY MEDICINE
Payer: COMMERCIAL

## 2025-08-18 ENCOUNTER — OFFICE VISIT (OUTPATIENT)
Dept: MEDICAL GROUP | Facility: LAB | Age: 65
End: 2025-08-18
Payer: COMMERCIAL

## 2025-08-18 VITALS
HEIGHT: 71 IN | DIASTOLIC BLOOD PRESSURE: 62 MMHG | BODY MASS INDEX: 34.41 KG/M2 | RESPIRATION RATE: 16 BRPM | HEART RATE: 78 BPM | WEIGHT: 245.8 LBS | TEMPERATURE: 97.6 F | SYSTOLIC BLOOD PRESSURE: 122 MMHG | OXYGEN SATURATION: 93 %

## 2025-08-18 DIAGNOSIS — E66.9 OBESITY (BMI 30-39.9): Primary | ICD-10-CM

## 2025-08-18 DIAGNOSIS — E78.00 HYPERCHOLESTEROLEMIA: ICD-10-CM

## 2025-08-18 LAB
ALBUMIN SERPL BCP-MCNC: 4.2 G/DL (ref 3.2–4.9)
ALBUMIN/GLOB SERPL: 1.2 G/DL
ALP SERPL-CCNC: 98 U/L (ref 30–99)
ALT SERPL-CCNC: 26 U/L (ref 2–50)
ANION GAP SERPL CALC-SCNC: 11 MMOL/L (ref 7–16)
AST SERPL-CCNC: 26 U/L (ref 12–45)
BILIRUB SERPL-MCNC: 0.6 MG/DL (ref 0.1–1.5)
BUN SERPL-MCNC: 16 MG/DL (ref 8–22)
CALCIUM ALBUM COR SERPL-MCNC: 9.3 MG/DL (ref 8.5–10.5)
CALCIUM SERPL-MCNC: 9.5 MG/DL (ref 8.5–10.5)
CHLORIDE SERPL-SCNC: 103 MMOL/L (ref 96–112)
CHOLEST SERPL-MCNC: 133 MG/DL (ref 100–199)
CO2 SERPL-SCNC: 25 MMOL/L (ref 20–33)
CREAT SERPL-MCNC: 0.92 MG/DL (ref 0.5–1.4)
GFR SERPLBLD CREATININE-BSD FMLA CKD-EPI: 94 ML/MIN/1.73 M 2
GLOBULIN SER CALC-MCNC: 3.4 G/DL (ref 1.9–3.5)
GLUCOSE SERPL-MCNC: 98 MG/DL (ref 65–99)
HDLC SERPL-MCNC: 42 MG/DL
LDLC SERPL CALC-MCNC: 66 MG/DL
POTASSIUM SERPL-SCNC: 4.3 MMOL/L (ref 3.6–5.5)
PROT SERPL-MCNC: 7.6 G/DL (ref 6–8.2)
SODIUM SERPL-SCNC: 139 MMOL/L (ref 135–145)
TRIGL SERPL-MCNC: 125 MG/DL (ref 0–149)

## 2025-08-18 PROCEDURE — 3078F DIAST BP <80 MM HG: CPT | Performed by: STUDENT IN AN ORGANIZED HEALTH CARE EDUCATION/TRAINING PROGRAM

## 2025-08-18 PROCEDURE — 80053 COMPREHEN METABOLIC PANEL: CPT

## 2025-08-18 PROCEDURE — 99213 OFFICE O/P EST LOW 20 MIN: CPT | Performed by: STUDENT IN AN ORGANIZED HEALTH CARE EDUCATION/TRAINING PROGRAM

## 2025-08-18 PROCEDURE — 36415 COLL VENOUS BLD VENIPUNCTURE: CPT

## 2025-08-18 PROCEDURE — 3074F SYST BP LT 130 MM HG: CPT | Performed by: STUDENT IN AN ORGANIZED HEALTH CARE EDUCATION/TRAINING PROGRAM

## 2025-08-18 PROCEDURE — 80061 LIPID PANEL: CPT

## 2025-08-18 ASSESSMENT — ENCOUNTER SYMPTOMS
SHORTNESS OF BREATH: 0
CHILLS: 0
FEVER: 0

## 2025-08-18 ASSESSMENT — FIBROSIS 4 INDEX: FIB4 SCORE: 1.22

## 2025-08-22 ENCOUNTER — APPOINTMENT (OUTPATIENT)
Dept: INTERNAL MEDICINE | Facility: IMAGING CENTER | Age: 65
End: 2025-08-22
Payer: COMMERCIAL

## 2025-08-29 ENCOUNTER — APPOINTMENT (OUTPATIENT)
Dept: INTERNAL MEDICINE | Facility: IMAGING CENTER | Age: 65
End: 2025-08-29
Payer: COMMERCIAL

## 2025-10-03 ENCOUNTER — APPOINTMENT (OUTPATIENT)
Dept: INTERNAL MEDICINE | Facility: IMAGING CENTER | Age: 65
End: 2025-10-03

## 2025-11-07 ENCOUNTER — APPOINTMENT (OUTPATIENT)
Dept: INTERNAL MEDICINE | Facility: IMAGING CENTER | Age: 65
End: 2025-11-07
Payer: COMMERCIAL